# Patient Record
Sex: FEMALE | Race: WHITE | Employment: FULL TIME | URBAN - METROPOLITAN AREA
[De-identification: names, ages, dates, MRNs, and addresses within clinical notes are randomized per-mention and may not be internally consistent; named-entity substitution may affect disease eponyms.]

---

## 2017-03-23 ENCOUNTER — MYC MEDICAL ADVICE (OUTPATIENT)
Dept: FAMILY MEDICINE | Facility: CLINIC | Age: 42
End: 2017-03-23

## 2017-03-23 NOTE — TELEPHONE ENCOUNTER
See patient message. Sounds like she means symptoms increasing. Would you advise to take two prilosec 20 to see if this helps?    Jojo Gonzalez RN

## 2017-03-24 DIAGNOSIS — N92.1 BREAKTHROUGH BLEEDING ON OCPS: ICD-10-CM

## 2017-03-24 NOTE — TELEPHONE ENCOUNTER
Mail order pharmacy request    levonorgestrel-ethinyl estradiol (SEASONALE) 0.15-0.03 MG per tablet      Last Written Prescription Date: 11/7/16  Last Fill Quantity: 90,  # refills: prn   Last Office Visit with [facility], [facility] or [facility] prescribing provider: 11/7/16

## 2017-03-27 RX ORDER — LEVONORGESTREL AND ETHINYL ESTRADIOL 0.15-0.03
1 KIT ORAL DAILY
Qty: 90 TABLET | Refills: 3 | Status: SHIPPED | OUTPATIENT
Start: 2017-03-27 | End: 2017-11-13

## 2017-03-27 NOTE — TELEPHONE ENCOUNTER
Prescription approved per [facility] Refill Protocol.  Sent to new pharmacy.  Daja Glaser, RN  Triage Nurse

## 2017-04-05 ENCOUNTER — OFFICE VISIT (OUTPATIENT)
Dept: FAMILY MEDICINE | Facility: CLINIC | Age: 42
End: 2017-04-05
Payer: COMMERCIAL

## 2017-04-05 VITALS
DIASTOLIC BLOOD PRESSURE: 62 MMHG | OXYGEN SATURATION: 100 % | TEMPERATURE: 97.8 F | BODY MASS INDEX: 27.66 KG/M2 | HEIGHT: 70 IN | SYSTOLIC BLOOD PRESSURE: 98 MMHG | HEART RATE: 81 BPM | WEIGHT: 193.2 LBS | RESPIRATION RATE: 14 BRPM

## 2017-04-05 DIAGNOSIS — K21.9 GASTROESOPHAGEAL REFLUX DISEASE, ESOPHAGITIS PRESENCE NOT SPECIFIED: Primary | ICD-10-CM

## 2017-04-05 PROCEDURE — 99214 OFFICE O/P EST MOD 30 MIN: CPT | Performed by: INTERNAL MEDICINE

## 2017-04-05 NOTE — PATIENT INSTRUCTIONS
Zantac/Ranitidine:  I would recommend trying Ranitidine 150 MG once daily in addition to Omeprazole.

## 2017-04-05 NOTE — PROGRESS NOTES
SUBJECTIVE:                                                    Cassi Ye is a 41 year old female who presents to clinic today for the following health issues:      GERD/Heartburn   Onset: Ongoing, but noticeably worsened over the past 3 months.    Description:     Burning in chest: YES    Intensity: Moderate    Progression of Symptoms: Worsening and is constant, severity seems to wax and wane.    Accompanying Signs & Symptoms:  Does it feel like food gets stuck: no  Nausea: no  Vomiting (bloody?): no  Abdominal Pain: YES - bloating  Black-Tarry stools: no  Bloody stools: no   History:   Previous ulcers: no    Precipitating factors:   Caffeine use: no  Alcohol use: no  NSAID/Aspirin use: YES minimal - takes Aleve as needed for headaches, which only occur periodically.  Tobacco use:   Worsened with pineapple     Alleviating factors:  Uncertain  Therapies Tried and outcome: Has been taking chewable antacids and Omeprazole with little to no relief. She has increased her dose of Omeprazole from one to two tablets per day.       Problem list and histories reviewed & adjusted, as indicated.  Additional history: as documented    Labs reviewed in EPIC    Reviewed and updated as needed this visit by clinical staff  Tobacco  Allergies  Meds  Med Hx  Surg Hx  Fam Hx  Soc Hx      Reviewed and updated as needed this visit by Provider         REVIEW OF SYSTEMS:  C: NEGATIVE for fever, chills, or change in weight  R: NEGATIVE for significant cough or SOB  CV: NEGATIVE for chest pain, palpitations or peripheral edema  GI: Hx of and POSITIVE for Gastroesophageal Reflux Disease - use of Chewable Antacids and Omeprazole; NEGATIVE for nausea, abdominal pain, or change in bowel habits  : no symptoms   N: NEGATIVE for weakness, dizziness or paresthesias  P: NEGATIVE for changes in mood or affect    This document serves as a record of the services and decisions personally performed and made by Kim Penn MD. It was  "created on her behalf by Jina Engle, a trained medical scribe. The creation of this document is based the provider's statements to the medical scribe.  Jina Engle, April 5, 2017 3:17 PM     OBJECTIVE:                                                    BP 98/62 (BP Location: Right arm, Patient Position: Chair, Cuff Size: Adult Regular)  Pulse 81  Temp 97.8  F (36.6  C) (Oral)  Resp 14  Ht 1.765 m (5' 9.5\")  Wt 87.6 kg (193 lb 3.2 oz)  LMP 01/26/2017  SpO2 100%  BMI 28.12 kg/m2  Body mass index is 28.12 kg/(m^2).    EXAM:  GENERAL: Patient appears healthy, alert and not distressed.  EYES: Eyes appear grossly normal to inspection, with normal conjunctivae and sclerae  RESP: Lungs are clear to auscultation - no rales, rhonchi or wheezes present  CV: Regular rate and rhythm, normal S1 S2 heart sounds, no ectopy, no peripheral edema present, peripheral pulses normal, no carotid bruit.  ABDOMEN: Soft, nontender, no hepatosplenomegaly, no masses, normal bowel sounds  MS: No gross musculoskeletal defects noted, no edema, gait is age appropriate without ataxia  NEURO: Patient exhibits normal strength and tone, normal speech and mentation  PSYCH: Mentation appears normal, affect is normal/bright    Diagnostic Test Results:  No results found for this or any previous visit (from the past 24 hour(s)).      ASSESSMENT/PLAN:                                                    (K21.9) Gastroesophageal reflux disease, esophagitis presence not specified  (primary encounter diagnosis)  Comment: Patient has been on PPI Omeprazole (Protonix historically) on a fairly chronic basis; Recently, she has been experiencing increased symptoms despite increasing her daily dose of Omeprazole from 20 to 40 MG; On occasion, she has to sleep upright because her symptoms are so prominent; Triggers reviewed - patient denied having used or consumed any caffeine, nicotine, carbonated beverages, alcohol, and has not been overwhelmingly " stressed lately; Recommended she try over-the-counter Ranitidine 150 MG or Pepcid 10 MG in addition to Omeprazole; Also referred the patient to follow-up with Gastroenterology for an EGD procedure to determine etiology of her symptoms.  Plan: GASTROENTEROLOGY ADULT REF PROCEDURE ONLY            The information in this document, created by a medical scribe for me, accurately reflects the services I personally performed and the decisions made by me. I have reviewed and approved this document for accuracy.  Dr. Kim Penn, April 5, 2017, 3:30 PM     Kim Pnen MD  Internal Medicine   [facility] [place]  25 minutes is spent with patient, over 50% of that time spent providing counselling, discussing and reviewing meds and potential side effects.

## 2017-04-05 NOTE — NURSING NOTE
"Chief Complaint   Patient presents with     Gastrointestinal Problem     increased heartburn, bloating,   intermittent hot flashes.   symptoms for about the past 3 months.        Initial BP 98/62 (BP Location: Right arm, Patient Position: Chair, Cuff Size: Adult Regular)  Pulse 81  Temp 97.8  F (36.6  C) (Oral)  Resp 14  Ht 5' 9.5\" (1.765 m)  Wt 193 lb 3.2 oz (87.6 kg)  LMP 01/26/2017  SpO2 100%  BMI 28.12 kg/m2 Estimated body mass index is 28.12 kg/(m^2) as calculated from the following:    Height as of this encounter: 5' 9.5\" (1.765 m).    Weight as of this encounter: 193 lb 3.2 oz (87.6 kg).  Medication Reconciliation: complete    "

## 2017-04-20 ENCOUNTER — HOSPITAL ENCOUNTER (OUTPATIENT)
Facility: CLINIC | Age: 42
Discharge: HOME | End: 2017-04-20
Attending: INTERNAL MEDICINE | Admitting: INTERNAL MEDICINE
Payer: COMMERCIAL

## 2017-04-20 VITALS
RESPIRATION RATE: 16 BRPM | SYSTOLIC BLOOD PRESSURE: 110 MMHG | OXYGEN SATURATION: 98 % | DIASTOLIC BLOOD PRESSURE: 76 MMHG

## 2017-04-20 LAB — UPPER GI ENDOSCOPY: NORMAL

## 2017-04-20 PROCEDURE — 25000132 ZZH RX MED GY IP 250 OP 250 PS 637: Performed by: INTERNAL MEDICINE

## 2017-04-20 PROCEDURE — 25000128 H RX IP 250 OP 636: Performed by: INTERNAL MEDICINE

## 2017-04-20 PROCEDURE — 43239 EGD BIOPSY SINGLE/MULTIPLE: CPT | Performed by: INTERNAL MEDICINE

## 2017-04-20 PROCEDURE — 88305 TISSUE EXAM BY PATHOLOGIST: CPT | Mod: 26 | Performed by: INTERNAL MEDICINE

## 2017-04-20 PROCEDURE — 25000125 ZZHC RX 250: Performed by: INTERNAL MEDICINE

## 2017-04-20 PROCEDURE — 40000104 ZZH STATISTIC MODERATE SEDATION < 10 MIN: Performed by: INTERNAL MEDICINE

## 2017-04-20 PROCEDURE — 88305 TISSUE EXAM BY PATHOLOGIST: CPT | Performed by: INTERNAL MEDICINE

## 2017-04-20 RX ORDER — ONDANSETRON 2 MG/ML
4 INJECTION INTRAMUSCULAR; INTRAVENOUS EVERY 6 HOURS PRN
Status: DISCONTINUED | OUTPATIENT
Start: 2017-04-20 | End: 2017-04-20 | Stop reason: HOSPADM

## 2017-04-20 RX ORDER — FENTANYL CITRATE 50 UG/ML
INJECTION, SOLUTION INTRAMUSCULAR; INTRAVENOUS PRN
Status: DISCONTINUED | OUTPATIENT
Start: 2017-04-20 | End: 2017-04-20 | Stop reason: HOSPADM

## 2017-04-20 RX ORDER — ONDANSETRON 4 MG/1
4 TABLET, ORALLY DISINTEGRATING ORAL EVERY 6 HOURS PRN
Status: DISCONTINUED | OUTPATIENT
Start: 2017-04-20 | End: 2017-04-20 | Stop reason: HOSPADM

## 2017-04-20 RX ORDER — NALOXONE HYDROCHLORIDE 0.4 MG/ML
.1-.4 INJECTION, SOLUTION INTRAMUSCULAR; INTRAVENOUS; SUBCUTANEOUS
Status: DISCONTINUED | OUTPATIENT
Start: 2017-04-20 | End: 2017-04-20 | Stop reason: HOSPADM

## 2017-04-20 RX ORDER — FLUMAZENIL 0.1 MG/ML
0.2 INJECTION, SOLUTION INTRAVENOUS
Status: DISCONTINUED | OUTPATIENT
Start: 2017-04-20 | End: 2017-04-20 | Stop reason: HOSPADM

## 2017-04-20 RX ORDER — LIDOCAINE 40 MG/G
CREAM TOPICAL
Status: DISCONTINUED | OUTPATIENT
Start: 2017-04-20 | End: 2017-04-20 | Stop reason: HOSPADM

## 2017-04-20 RX ORDER — ONDANSETRON 2 MG/ML
4 INJECTION INTRAMUSCULAR; INTRAVENOUS
Status: DISCONTINUED | OUTPATIENT
Start: 2017-04-20 | End: 2017-04-20 | Stop reason: HOSPADM

## 2017-04-20 NOTE — LETTER
April 6, 2017      Cassi Ye  [address] [place] [address]              Dear Cassi Ye,        Thank you for choosing [facility].  You are scheduled for the following service (s).   Date:    4-20-17    Procedure:   EGD  Doctor: Yissel              Arrival Time:  0930   *check in at Emergency/Endoscopy desk*  Procedure Time:  1000     Location:   [facility]       Endoscopy Department, First Floor (Enter through ER Doors) *        [address] [place], [place] [address]     [phone]   UPPER ENDOSCOPY         PRE-PROCEDURE CHECKLIST    If you have diabetes, ask your regular doctor for diet and medication restrictions.  If you take a medication to thin your blood (such as Coumadin or Lovenox) and have not already discussed this please call your primary doctor to advice on holding this medication  If you take aspirin or Plavix, you may continue to do so.  If you are or may be pregnant, please discuss the risks and benefits of this procedure with your doctor.  You must arrange for a ride for the day of your exam. If you fail to arrange transportation with a responsible adult, your procedure will need to be cancelled and rescheduled. Taxi ,Bus and Medical transport are not acceptable unless you have a responsible adult that you know & trust with you.  Please arrange for this  to be able to pick you up in our department, approximately one hour after your scheduled procedure, if they are not able to stay with you.  *If you must cancel or reschedule your appointment, please call Endoscopy  at [phone].*                        PREPARATION  To ensure a successful exam, please follow all instructions carefully. Failure to accurately and completely prepare for your exam may result in the need for an additional procedure .      The night before your exam:    Stop eating solid foods at 11:45 pm.     Clear liquids are okay to  drink (examples: water, Gatorade, clear broth and apple juice).     Do not drink red liquids or alcoholic beverages.  The day of your exam:    Stop drinking clear liquids 4 hours before your exam     You may take your usual medications with 4 oz. of water at least 4 hours prior to your procedure.  When you leave for the procedure:    Bring a list of all of your current medications, including any allergy or over-the-counter medications.    Bring a photo ID as well as up-to-date insurance information, such as your insurance card and any referral forms that might be required by your payer.   What is an upper endoscopy?  An upper endoscopy is a test performed to evaluate symptoms of persistent upper abdominal pain, bleeding, nausea, vomiting or difficulty swallowing. During the procedure, a doctor examines the lining of your esophagus, stomach and the first part of your small intestine through a thin, flexible tube called an endoscope. If growths or other abnormalities are found during the procedure, the doctor may remove the abnormal tissue for further examination, or biopsy. An upper endoscopy may also be used to treat various conditions of the upper gastrointestinal (GI) tract, such as narrowing, abnormal growths or bleeding.  What happens during an upper endoscopy?  Plan to spend up to two hours at the endoscopy center the day of your procedure. The exam itself takes about 15 minutes to complete.   Before the exam:    You will change into a gown.    Your medical history will be reviewed with you and you will be given a consent form to sign.     A nurse will insert an intravenous (IV) line into your hand or arm.  During the exam:    Medicine will be given through the IV line to help you relax and feel drowsy.     Your heart rate and oxygen levels will be monitored. If your blood pressure is low, you may be given fluids through the IV line.     The doctor will insert a flexible, hollow tube - called an endoscope -  into your mouth and will advance it slowly through the esophagus, stomach and duodenum (the first part of your small intestine).     You may have a feeling of pressure or fullness.     If you have difficulty swallowing, and the doctor finds a narrowing in your esophagus, it may be possible for the area to be expanded during the exam.     If abnormal tissue is found, the doctor may remove it through the endoscope for closer examination, or biopsy. Tissue removal is painless.  What happens after the exam?    The doctor will talk with you about the initial results of your exam.     The doctor will prepare a full report for the physician who referred you for the upper endoscopy.     You may feel bloated after the procedure. This is normal.     Your throat may feel sore for a short time.     Medication given during the exam will prohibit you from driving for the rest of the day.     Following the exam, you may resume your normal diet. Avoid alcohol until the next day.     You may resume your regular activities the day after the procedure.     A nurse will provide you with complete discharge instructions before you leave the endoscopy center. Be sure to ask the nurse for specific instructions if you take blood thinners such as aspirin, Coumadin or Plavix.     Any tissue samples removed during the exam will be sent to a lab for evaluation. It may take 5-7 working days for you to be notified of the results.                                                     DIRECTIONS  From the north ([facility])  Take [address] south, exit to [address] . Get into the left hand juan r, turn left (east), go one-half mile to [address]. Turn left (north) on [address]. Go north to first stoplight, take a right on the newly constructed road, [address] and follow it to the Emergency entrance.    From the south ([facility])  Take 35 north to the east split, [address], and exit to [address] . Turn left  (west) on [address]  to [address]. Turn right (north) on [address]. Go north to first stoplight, take a right on the newly constructed road, [address] and follow it to the Emergency entrance.    From the east via 35[address] ([facility])  Take [address] south to [place] [address] exit. Turn right on [address] . Go west to [address]. Turn right (north) on [address], go to the first stoplight, take a right and follow the newly constructed road, [address], to the Emergency entrance.    From the east via [address] ([facility])  Take [address] west to [address]. Turn left (south) on [address] to [address]. Turn left (east) on [address] and follow the newly constructed road to the Emergency entrance.    From the west via [address] ([facility])  Take [address] east to [address]. Turn right (south) on [address] to [address].  Turn left (east) on [address] and follow the newly constructed road to the Emergency entrance.

## 2017-04-20 NOTE — IP AVS SNAPSHOT
MRN:[id number]                      After Visit Summary   4/20/2017    Cassi Ye    MRN: [id number]           Thank you!     Thank you for choosing [facility] for your care. Our goal is always to provide you with excellent care. Hearing back from our patients is one way we can continue to improve our services. Please take a few minutes to complete the written survey that you may receive in the mail after you visit. If you would like to speak to someone directly about your visit please contact Patient Relations at [phone]. Thank you!          Patient Information     Date Of Birth          1975        About your hospital stay     You were admitted on:  April 20, 2017 You last received care in the:  [facility]    You were discharged on:  April 20, 2017       Who to Call     For medical emergencies, please call 911.  For non-urgent questions about your medical care, please call your primary care provider or clinic, [phone]  For questions related to your surgery, please call your surgery clinic        Attending Provider     Provider Specialty    Mario Dey MD Gastroenterology       Primary Care Provider Office Phone # Fax #    Kim Kimberly Penn [phone]       [facility] [place] [address] [facility] [address]        Further instructions from your care team         Understanding H. pylori and Ulcers  Traditionally, ulcers, or sores in the lining of your digestive tract, were thought to be caused by too much spicy food, stress, or an anxious personality. We now know that most ulcers are probably due to infection with bacteria known as Helicobacter pylori (H. pylori).     H. pylori invade and disturb the lining of the digestive tract. Acid may weaken the area, causing an ulcer.      Common Ulcer Symptoms  Burning, cramping, or hunger-like pain in the stomach area, often one to three hours after a meal or in the  middle of the night  Pain that gets better or worse with eating  Nausea or vomiting  Black, tarry, or bloody stools (which means the ulcer is bleeding)  Or you may have no symptoms.     An ulcer can form in two areas of the digestive tract; the stomach and the duodenum (where the stomach meets the small intestine).      Your Evaluation  An evaluation by your doctor can show if you have an ulcer and determine whether it was caused by H. pylori. Your doctor may ask you questions, examine you, and possibly do some tests. These may include:  A special X-ray called a barium upper gastrointestinal series, to help locate an ulcer. During the test, you drink a chalky liquid. This liquid helps the ulcer show up on the X-ray.  An endoscopic exam, done with a long tube passed through your mouth into your stomach, to give the doctor a closer look at your ulcer. You will be lightly sedated for this procedure. Your doctor can also take a tissue sample to test for H. pylori.  Blood, stool, and breath tests are also available to show whether you have H. pylori in your digestive tract.  Your Treatment  To kill H. pylori so your ulcer can heal, your doctor will probably prescribe antibiotics. Other ulcer medications that help reduce stomach acid may also be prescribed as well. Testing after treatment is recommended to be sure the H. pylori infection is gone. Usually, killing H. pylori helps keep the ulcer from returning.    2955[address] [place] [address]. All rights reserved. This information is not intended as a substitute for professional medical care. Always follow your healthcare professional's instructions.                Pending Results     No orders found from 4/18/2017 to 4/21/2017.            Admission Information     Date & Time Provider Department Dept. Phone    4/20/2017 Mario Dey MD [facility] [phone]      Your Vitals Were     Blood Pressure Respirations  Last Period Pulse Oximetry          114/73 (Cuff Size: Adult Regular) 15 01/26/2017 96%        [employer] Information     [employer] gives you secure access to your electronic health record. If you see a primary care provider, you can also send messages to your care team and make appointments. If you have questions, please call your primary care clinic.  If you do not have a primary care provider, please call [phone] and they will assist you.        Care EveryWhere ID     This is your Care EveryWhere ID. This could be used by other organizations to access your [place] medical records  [id number]           Review of your medicines      CONTINUE these medicines which have NOT CHANGED        Dose / Directions    levonorgestrel-ethinyl estradiol 0.15-0.03 MG per tablet   Commonly known as:  SEASONALE   Used for:  Breakthrough bleeding on OCPs        Dose:  1 tablet   Take 1 tablet by mouth daily   Quantity:  90 tablet   Refills:  3       magnesium 250 MG tablet        Dose:  1 tablet   Take 1 tablet by mouth 2 times daily.   Refills:  0       MAXALT-MLT 10 MG ODT tab   Used for:  Variants of migraine, not elsewhere classified, without mention of intractable migraine without mention of status migrainosus   Generic drug:  rizatriptan        Dose:  10 mg   Take 10 mg by mouth at onset of headache.   Refills:  0       multivitamin per tablet        1 TABLET DAILY   Refills:  0       nortriptyline 25 MG capsule   Commonly known as:  PAMELOR   Used for:  Variants of migraine, not elsewhere classified, without mention of intractable migraine without mention of status migrainosus        Dose:  50 mg   Take 50 mg by mouth At Bedtime.   Refills:  0       priLOSEC OTC 20 MG tablet   Generic drug:  omeprazole        Dose:  1 tablet   Take 1 tablet by mouth daily.   Refills:  0       VITAMIN B-2 PO        Dose:  200 mg   Take 200 mg by mouth 2 times daily.   Refills:  0       VITAMIN D (CHOLECALCIFEROL) PO        Dose:  1000 Units    Take 1,000 Units by mouth daily   Refills:  0                Protect others around you: Learn how to safely use, store and throw away your medicines at www.disposemymeds.org.             Medication List: This is a list of all your medications and when to take them. Check marks below indicate your daily home schedule. Keep this list as a reference.      Medications           Morning Afternoon Evening Bedtime As Needed    levonorgestrel-ethinyl estradiol 0.15-0.03 MG per tablet   Commonly known as:  SEASONALE   Take 1 tablet by mouth daily                                magnesium 250 MG tablet   Take 1 tablet by mouth 2 times daily.                                MAXALT-MLT 10 MG ODT tab   Take 10 mg by mouth at onset of headache.   Generic drug:  rizatriptan                                multivitamin per tablet   1 TABLET DAILY                                nortriptyline 25 MG capsule   Commonly known as:  PAMELOR   Take 50 mg by mouth At Bedtime.                                priLOSEC OTC 20 MG tablet   Take 1 tablet by mouth daily.   Generic drug:  omeprazole                                VITAMIN B-2 PO   Take 200 mg by mouth 2 times daily.                                VITAMIN D (CHOLECALCIFEROL) PO   Take 1,000 Units by mouth daily                                          More Information        What Is Acid Reflux?    Do you have to clear your throat or cough often? Are you hoarse? Do you have difficulty swallowing? If you have these or other throat symptoms, you may have acid reflux (when stomach acid washes up and irritates your throat).  Why You Have Throat Symptoms  At both ends of the esophagus (the tube that carries food to the stomach) are the esophageal sphincters. These muscles relax to let food pass, then tighten to keep stomach acid down. When the lower esophageal sphincter (LES) doesn t tighten enough, acid can reflux from the stomach into the esophagus. This may cause heartburn. If the upper  esophageal sphincter (UES) also doesn t work well, acid can travel higher and enter your throat (pharynx). In many cases, this causes throat symptoms.  Common Throat Symptoms    Frequent need to clear your throat    Feeling like you re choking    Chronic cough    Hoarseness    Trouble swallowing    Sensation of having  a lump in the throat     Sour or acid taste    Recurrent sore throat     7941-7970 [employer]. [address] [place] [address]. All rights reserved. This information is not intended as a substitute for professional medical care. Always follow your healthcare professional's instructions.                Tips to Control Acid Reflux  To control acid reflux, you ll need to make some basic diet and lifestyle changes. The simple steps outlined below may be all you ll need to relieve discomfort.  Watch What You Eat      Avoid fatty foods and spicy foods.    Eat fewer acidic foods, such as citrus and tomato-based foods. These can increase symptoms.    Limit drinking alcohol, caffeine, and fizzy beverages. All increase acid reflux.    Try limiting chocolate, peppermint, and spearmint. These can worsen acid reflux in some people.  Watch When You Eat    Avoid lying down for 3 hours after eating.    Do not snack before going to bed.  Raise Your Head    Raising your head and upper body by 4 inches to 6 inches helps limit reflux when you re lying down. Put blocks under the head of the bed frame to raise it.  Other Changes    Lose weight, if you need to.    Don t work out near bedtime.    Avoid tight-fitting clothes.    Limit aspirin and ibuprofen.    Stop smoking.     6628-4995 [employer]. [address] [place] [address]. All rights reserved. This information is not intended as a substitute for professional medical care. Always follow your healthcare professional's instructions.

## 2017-04-20 NOTE — H&P
Pre-Endoscopy History and Physical     Cassi Ye MRN# [id number]   YOB: 1975 Age: 41 year old     Date of Procedure: 4/20/2017  Primary care provider: Kim Penn  Type of Endoscopy: Gastroscopy with possible biopsy, possible dilation  Reason for Procedure: reflux  Type of Anesthesia Anticipated: Conscious Sedation    HPI:    Cassi is a 41 year old female who will be undergoing the above procedure.      A history and physical has been performed. The patient's medications and allergies have been reviewed. The risks and benefits of the procedure and the sedation options and risks were discussed with the patient.  All questions were answered and informed consent was obtained.      She denies a personal or family history of anesthesia complications or bleeding disorders.     Patient Active Problem List   Diagnosis     Migraine variant     Blood type A+     GERD (gastroesophageal reflux disease)     CARDIOVASCULAR SCREENING; LDL GOAL LESS THAN 160        Past Medical History:   Diagnosis Date     Pain in joint, pelvic region and thigh      Variants of migraine, not elsewhere classified, without mention of intractable migraine without mention of status migrainosus     Nasal spray        Past Surgical History:   Procedure Laterality Date     C NONSPECIFIC PROCEDURE  12/00    Cryotherapy       Social History   Substance Use Topics     Smoking status: Former Smoker     Packs/day: 0.50     Years: 10.00     Types: Cigarettes     Quit date: 4/13/2006     Smokeless tobacco: Never Used      Comment: Started in 1994     Alcohol use 0.0 oz/week     0 Standard drinks or equivalent per week      Comment: occasional, not while pregnant       Family History   Problem Relation Age of Onset     Family History Negative Brother      Lipids Father      Neurologic Disorder Father      migraines     Family History Negative Mother      Breast Cancer Paternal Aunt      Breast Cancer Paternal Grandmother       "Blood Disease Other      paternal uncle with leukemia     CANCER Paternal Aunt      ovarian cancer       Prior to Admission medications    Medication Sig Start Date End Date Taking? Authorizing Provider   levonorgestrel-ethinyl estradiol (SEASONALE) 0.15-0.03 MG per tablet Take 1 tablet by mouth daily 3/27/17   Kim Penn MD   VITAMIN D, CHOLECALCIFEROL, PO Take 1,000 Units by mouth daily    Reported, Patient   magnesium 250 MG tablet Take 1 tablet by mouth 2 times daily.    Jeanmarie Loving MD   omeprazole (PRILOSEC OTC) 20 MG tablet Take 1 tablet by mouth daily.    Reported, Patient   Riboflavin (VITAMIN B-2 PO) Take 200 mg by mouth 2 times daily.    Reported, Patient   nortriptyline (PAMELOR) 25 MG capsule Take 50 mg by mouth At Bedtime.    [facility], [facility]   rizatriptan (MAXALT-MLT) 10 MG disintegrating tablet Take 10 mg by mouth at onset of headache.    [facility]   MULTIVITAMINS OR TABS 1 TABLET DAILY    Reported, Patient       Allergies   Allergen Reactions     Penicillins      rash        REVIEW OF SYSTEMS:   5 point ROS negative except as noted above in HPI, including Gen., Resp., CV, GI &  system review.    PHYSICAL EXAM:   LMP 01/26/2017 Estimated body mass index is 28.12 kg/(m^2) as calculated from the following:    Height as of 4/5/17: 1.765 m (5' 9.5\").    Weight as of 4/5/17: 87.6 kg (193 lb 3.2 oz).   GENERAL APPEARANCE: alert, and oriented  MENTAL STATUS: alert  AIRWAY EXAM: Mallampatti Class I (visualization of the soft palate, fauces, uvula, anterior and posterior pillars)  RESP: lungs clear to auscultation - no rales, rhonchi or wheezes  CV: regular rates and rhythm  DIAGNOSTICS:    Not indicated    IMPRESSION   ASA Class 2 - Mild systemic disease    PLAN:   Plan for Gastroscopy with possible biopsy, possible dilation. We discussed the risks, benefits and alternatives and the patient wished to proceed.    The above has been forwarded to the " consulting provider.      Signed Electronically by: Mario Dey  April 20, 2017

## 2017-04-20 NOTE — DISCHARGE INSTRUCTIONS
Understanding H. pylori and Ulcers  Traditionally, ulcers, or sores in the lining of your digestive tract, were thought to be caused by too much spicy food, stress, or an anxious personality. We now know that most ulcers are probably due to infection with bacteria known as Helicobacter pylori (H. pylori).     H. pylori invade and disturb the lining of the digestive tract. Acid may weaken the area, causing an ulcer.      Common Ulcer Symptoms  Burning, cramping, or hunger-like pain in the stomach area, often one to three hours after a meal or in the middle of the night  Pain that gets better or worse with eating  Nausea or vomiting  Black, tarry, or bloody stools (which means the ulcer is bleeding)  Or you may have no symptoms.     An ulcer can form in two areas of the digestive tract; the stomach and the duodenum (where the stomach meets the small intestine).      Your Evaluation  An evaluation by your doctor can show if you have an ulcer and determine whether it was caused by H. pylori. Your doctor may ask you questions, examine you, and possibly do some tests. These may include:  A special X-ray called a barium upper gastrointestinal series, to help locate an ulcer. During the test, you drink a chalky liquid. This liquid helps the ulcer show up on the X-ray.  An endoscopic exam, done with a long tube passed through your mouth into your stomach, to give the doctor a closer look at your ulcer. You will be lightly sedated for this procedure. Your doctor can also take a tissue sample to test for H. pylori.  Blood, stool, and breath tests are also available to show whether you have H. pylori in your digestive tract.  Your Treatment  To kill H. pylori so your ulcer can heal, your doctor will probably prescribe antibiotics. Other ulcer medications that help reduce stomach acid may also be prescribed as well. Testing after treatment is recommended to be sure the H. pylori infection is gone. Usually, killing H. pylori  helps keep the ulcer from returning.    [id number] [employer], [address] [place] [address], [place] [address]. All rights reserved. This information is not intended as a substitute for professional medical care. Always follow your healthcare professional's instructions.

## 2017-04-21 LAB — COPATH REPORT: NORMAL

## 2017-05-05 ENCOUNTER — TRANSFERRED RECORDS (OUTPATIENT)
Dept: HEALTH INFORMATION MANAGEMENT | Facility: CLINIC | Age: 42
End: 2017-05-05

## 2017-08-09 ENCOUNTER — OFFICE VISIT (OUTPATIENT)
Dept: FAMILY MEDICINE | Facility: CLINIC | Age: 42
End: 2017-08-09
Payer: COMMERCIAL

## 2017-08-09 VITALS
TEMPERATURE: 98.1 F | SYSTOLIC BLOOD PRESSURE: 96 MMHG | BODY MASS INDEX: 27.61 KG/M2 | WEIGHT: 189.7 LBS | DIASTOLIC BLOOD PRESSURE: 50 MMHG | RESPIRATION RATE: 15 BRPM | HEART RATE: 85 BPM | OXYGEN SATURATION: 100 %

## 2017-08-09 DIAGNOSIS — M77.11 LATERAL EPICONDYLITIS OF RIGHT ELBOW: Primary | ICD-10-CM

## 2017-08-09 PROCEDURE — 99213 OFFICE O/P EST LOW 20 MIN: CPT | Performed by: INTERNAL MEDICINE

## 2017-08-09 RX ORDER — NAPROXEN SODIUM 220 MG
440 TABLET ORAL 2 TIMES DAILY WITH MEALS
Qty: 60 TABLET | COMMUNITY
Start: 2017-08-09 | End: 2017-11-13

## 2017-08-09 NOTE — MR AVS SNAPSHOT
After Visit Summary   8/9/2017    Cassi Ye    MRN: [id number]           Patient Information     Date Of Birth          1975        Visit Information        Provider Department      8/9/2017 4:15 PM [facility]        Today's Diagnoses     Lateral epicondylitis of right elbow    -  1      Care Instructions    Follow up with Hand/Sports Medicine     Try taking Aleve 2 tablets twice daily for 10-14 to help with inflammation     Continue with regular use of Tennis Elbow strap - check positioning with pulling up under a table, as directed                 Lateral Epicondylitis (Tennis Elbow)   Rehabilitation Exercises   You may do the stretching exercises right away. You may do the strengthening exercises when stretching is nearly painless.   Stretching exercises     Wrist range of motion: Bend your wrist forward and backward as far as you can. Do 3 sets of 10.     Pronation and supination of the forearm: With your elbow bent 90 , turn your palm upward and hold for 5 seconds. Slowly turn your palm downward and hold for 5 seconds. Make sure you keep your elbow at your side and bent 90  throughout this exercise. Do 3 sets of 10.     Elbow range of motion: Gently bring your palm up toward your shoulder and bend your elbow as far as you can. Then straighten your elbow as far as you can 10 times. Do 3 sets of 10.   Strengthening exercises     Wrist flexion exercise: Hold a can or hammer handle in your hand with your palm facing up. Bend your wrist upward. Slowly lower the weight and return to the starting position. Do 3 sets of 10. Gradually increase the weight of the can or weight you are holding.     Wrist extension exercise: Hold a soup can or hammer handle in your hand with your palm facing down. Slowly bend your wrist upward. Slowly lower the weight down into the starting position. Do 3 sets of 10. Gradually increase the weight of the object you are  holding.     Wrist radial deviation strengthening: Put your wrist in the sideways position with your thumb up. Hold a can of soup or a hammer handle and gently bend your wrist up, with the thumb reaching toward the ceiling. Slowly lower to the starting position. Do not move your forearm throughout this exercise. Do 3 sets of 10.     Forearm pronation and supination strengthening: Hold a soup can or hammer handle in your hand and bend your elbow 90 . Slowly rotate your hand with your palm upward and then palm down. Do 3 sets of 10.     Wrist extension (with broom handle): Stand up and hold a broom handle in both hands. With your arms at shoulder level, elbows straight and palms down, roll the broom handle backward in your hand as if you are reeling something in using a broom handle. Do 3 sets of 10.   Written by Nakita Parr MS, PT, for [employer].   Published by [employer].   This content is reviewed periodically and is subject to change as new health information becomes available. The information is intended to inform and educate and is not a replacement for medical evaluation, advice, diagnosis or treatment by a healthcare professional.   Sports Medicine Advisor 2003.1 Index  Sports Medicine Advisor 2003.1 Credits   Copyright   2003 [employer]. All rights reserved.                      Follow-ups after your visit        Additional Services     ORTHO  REFERRAL       [facility] is referring you to the Orthopedic  Services at [facility].       The  Representative will assist you in the coordination of your Orthopedic and Musculoskeletal Care as prescribed by your physician.    The  Representative will call you within 1 business day to help schedule your appointment, or you may contact the  Representative at:    All areas ~ [phone]     Type of Referral : Non Surgical        Timeframe requested: 3 - 5 days    Coverage of these services is subject to the terms and limitations of your health insurance plan.  Please call member services at your health plan with any benefit or coverage questions.      If X-rays, CT or MRI's have been performed, please contact the facility where they were done to arrange for , prior to your scheduled appointment.  Please bring this referral request to your appointment and present it to your specialist.                  Your next 10 appointments already scheduled     Nov 13, 2017  4:15 PM CST   [facility] with Kim Penn MD   [facility]    [address] [facility] [address] [phone]              Who to contact     If you have questions or need follow up information about today's clinic visit or your schedule please contact [facility] directly at [phone].  Normal or non-critical lab and imaging results will be communicated to you by MyChart, letter or phone within 4 business days after the clinic has received the results. If you do not hear from us within 7 days, please contact the clinic through "[employer]hart or phone. If you have a critical or abnormal lab result, we will notify you by phone as soon as possible.  Submit refill requests through [employer] or call your pharmacy and they will forward the refill request to us. Please allow 3 business days for your refill to be completed.          Additional Information About Your Visit        "[employer] Information     [employer] gives you secure access to your electronic health record. If you see a primary care provider, you can also send messages to your care team and make appointments. If you have questions, please call your primary care clinic.  If you do not have a primary care provider, please call [phone] and they will assist you.        Care EveryWhere ID     This is your Care EveryWhere ID. This  could be used by other organizations to access your [facility] medical records  [id number]        Your Vitals Were     Pulse Temperature Respirations Pulse Oximetry BMI (Body Mass Index)       85 98.1  F (36.7  C) (Oral) 15 100% 27.61 kg/m2        Blood Pressure from Last 3 Encounters:   08/09/17 96/50   04/20/17 110/76   04/05/17 98/62    Weight from Last 3 Encounters:   08/09/17 189 lb 11.2 oz (86 kg)   04/05/17 193 lb 3.2 oz (87.6 kg)   11/07/16 190 lb (86.2 kg)              We Performed the Following     ORTHO  REFERRAL          Today's Medication Changes          These changes are accurate as of: 8/9/17  5:01 PM.  If you have any questions, ask your nurse or doctor.               Start taking these medicines.        Dose/Directions    naproxen sodium 220 MG tablet   Commonly known as:  ANAPROX   Used for:  Lateral epicondylitis of right elbow   Started by:  Kim Penn MD        Dose:  440 mg   Take 2 tablets (440 mg) by mouth 2 times daily (with meals)   Quantity:  60 tablet   Refills:  0            Where to get your medicines      Some of these will need a paper prescription and others can be bought over the counter.  Ask your nurse if you have questions.     You don't need a prescription for these medications     naproxen sodium 220 MG tablet                Primary Care Provider Office Phone # Fax #    Kim Penn [phone] [address] [facility] [address]        Equal Access to Services     REYES REYES AH: Hadii codey ku hadasho Socurryali, waaxda luqadaha, qaybta kaalmada adeegyada, waxrosaura idimaximo hernández. So [facility] [phone].    ATENCIÓN: Si habla español, tiene a robertson disposición servicios gratuitos de asistencia lingüística. Llame al [phone].    We comply with applicable federal civil rights laws and [place] laws. We do not discriminate on the basis of race, color, national origin, age, disability sex, sexual orientation or  gender identity.            Thank you!     Thank you for [facility]  [facility]. Our goal is always to provide you with excellent care. Hearing back from our patients is one way we can continue to improve our services. Please take a few minutes to complete the written survey that you may receive in the mail after your visit with us. Thank you!             Your Updated Medication List - Protect others around you: Learn how to safely use, store and throw away your medicines at www.disposemymeds.org.          This list is accurate as of: 8/9/17  5:01 PM.  Always use your most recent med list.                   Brand Name Dispense Instructions for use Diagnosis    levonorgestrel-ethinyl estradiol 0.15-0.03 MG per tablet    SEASONALE    90 tablet    Take 1 tablet by mouth daily    Breakthrough bleeding on OCPs       magnesium 250 MG tablet      Take 1 tablet by mouth 2 times daily.        MAXALT-MLT 10 MG ODT tab   Generic drug:  rizatriptan      Take 10 mg by mouth at onset of headache.    Variants of migraine, not elsewhere classified, without mention of intractable migraine without mention of status migrainosus       multivitamin per tablet      1 TABLET DAILY        naproxen sodium 220 MG tablet    ANAPROX    60 tablet    Take 2 tablets (440 mg) by mouth 2 times daily (with meals)    Lateral epicondylitis of right elbow       nortriptyline 25 MG capsule    PAMELOR     Take 50 mg by mouth At Bedtime.    Variants of migraine, not elsewhere classified, without mention of intractable migraine without mention of status migrainosus       priLOSEC OTC 20 MG tablet   Generic drug:  omeprazole      Take 1 tablet by mouth daily.        VITAMIN B-2 PO      Take 200 mg by mouth 2 times daily.        VITAMIN D (CHOLECALCIFEROL) PO      Take 1,000 Units by mouth daily

## 2017-08-09 NOTE — PATIENT INSTRUCTIONS
Follow up with Hand/Sports Medicine     Try taking Aleve 2 tablets twice daily for 10-14d to help with inflammation     Continue with regular use of Tennis Elbow strap - check positioning and proper placement to offload the lateral epicondyle.  Reviewed at appt                 Lateral Epicondylitis (Tennis Elbow)   Rehabilitation Exercises   You may do the stretching exercises right away. You may do the strengthening exercises when stretching is nearly painless.   Stretching exercises     Wrist range of motion: Bend your wrist forward and backward as far as you can. Do 3 sets of 10.     Pronation and supination of the forearm: With your elbow bent 90 , turn your palm upward and hold for 5 seconds. Slowly turn your palm downward and hold for 5 seconds. Make sure you keep your elbow at your side and bent 90  throughout this exercise. Do 3 sets of 10.     Elbow range of motion: Gently bring your palm up toward your shoulder and bend your elbow as far as you can. Then straighten your elbow as far as you can 10 times. Do 3 sets of 10.   Strengthening exercises     Wrist flexion exercise: Hold a can or hammer handle in your hand with your palm facing up. Bend your wrist upward. Slowly lower the weight and return to the starting position. Do 3 sets of 10. Gradually increase the weight of the can or weight you are holding.     Wrist extension exercise: Hold a soup can or hammer handle in your hand with your palm facing down. Slowly bend your wrist upward. Slowly lower the weight down into the starting position. Do 3 sets of 10. Gradually increase the weight of the object you are holding.     Wrist radial deviation strengthening: Put your wrist in the sideways position with your thumb up. Hold a can of soup or a hammer handle and gently bend your wrist up, with the thumb reaching toward the ceiling. Slowly lower to the starting position. Do not move your forearm throughout this exercise. Do 3 sets of 10.     Forearm  pronation and supination strengthening: Hold a soup can or hammer handle in your hand and bend your elbow 90 . Slowly rotate your hand with your palm upward and then palm down. Do 3 sets of 10.     Wrist extension (with broom handle): Stand up and hold a broom handle in both hands. With your arms at shoulder level, elbows straight and palms down, roll the broom handle backward in your hand as if you are reeling something in using a broom handle. Do 3 sets of 10.   Written by Nakita Parr, MS, PT, for [employer].   Published by [employer].   This content is reviewed periodically and is subject to change as new health information becomes available. The information is intended to inform and educate and is not a replacement for medical evaluation, advice, diagnosis or treatment by a healthcare professional.   Sports Medicine Advisor 2003.1 Index  Sports Medicine Advisor 2003.1 Credits   Copyright   2003 [employer]. All rights reserved.

## 2017-08-09 NOTE — NURSING NOTE
"Chief Complaint   Patient presents with     Musculoskeletal Problem     right upper arm that goes down to the fingers.  at times thumb and index finger have a burning sensation.   symptoms present for  a few years but worse and more persistent over  the past few months        Initial BP 96/50  Pulse 85  Temp 98.1  F (36.7  C) (Oral)  Resp 15  Wt 189 lb 11.2 oz (86 kg)  SpO2 100%  BMI 27.61 kg/m2 Estimated body mass index is 27.61 kg/(m^2) as calculated from the following:    Height as of 4/5/17: 5' 9.5\" (1.765 m).    Weight as of this encounter: 189 lb 11.2 oz (86 kg).  Medication Reconciliation: complete    "

## 2017-08-09 NOTE — PROGRESS NOTES
SUBJECTIVE:                                                    Cassi Ye is a 41 year old female who presents to clinic today for the following health issues:    Joint Pain    Onset: has been present for several years but worse over the past few months.      Description:   Location: right elbow, right wrist and from the upper arm and into the fingers (thumb and index fingers)  Character: Burning and shooting sensation from the elbow to fingers (thumb and index fingers)    Intensity: moderate    Progression of Symptoms: worse, intermittent    Accompanying Signs & Symptoms:  Other symptoms: radiation of pain to to the fingers (thumb and index fingers)    History: Previous similar pain: YES- just as noted      Precipitating factors: Trauma or overuse: nothing specific but is on a computer most of the day    Alleviating factors: Improved by: tennis elbow brace does help some - worse without regular use    Therapies Tried and outcome: tennis elbow brace     She complained of minimal involvement of pain above elbow. She reported an instance of pain with using a TV remote the other night, and was unable to do yoga without pain usually.     She has been taking one Aleve daily for pain relief, with some good results.       Problem list and histories reviewed & adjusted, as indicated.  Additional history: as documented    Reviewed and updated as needed this visit by clinical staff  Tobacco  Allergies  Meds  Problems  Med Hx  Surg Hx  Fam Hx  Soc Hx        Reviewed and updated as needed this visit by Provider  Allergies  Meds  Problems         ROS:  Constitutional, HEENT, cardiovascular, pulmonary, GI, , musculoskeletal, neuro, skin, endocrine and psych systems are negative, except as in HPI or otherwise noted     This document serves as a record of the services and decisions personally performed and made by Kim Penn MD. It was created on her behalf by Diallo Andersen, a trained medical scribe. The  creation of this document is based the provider's statements to the medical scribe.  Diallo Andersen August 9, 2017 4:44 PM    OBJECTIVE:   EXAM:  Patient is alert, oriented, cooperative in no acute distress, overweight  BP 96/50  Pulse 85  Temp 98.1  F (36.7  C) (Oral)  Resp 15  Wt 189 lb 11.2 oz (86 kg)  SpO2 100%  BMI 27.61 kg/m2    EXTREMITIES: Right UE - tender to palpation of radial aspect of elbow, no thenar/hypothenar eminence wasting, no pain with active or passive shoulder ROM activities strength preserved   NEUROLOGIC: reflexes 2/4 throughout right UE, strength 5/5 to right UE with pain, sensation grossly intact  SKIN: without rashes or significant lesions to visible skin    Diagnostic Test Results:  No results found for this or any previous visit (from the past 24 hour(s)).    ASSESSMENT/PLAN:     (M77.11) Lateral epicondylitis of right elbow  (primary encounter diagnosis)  Comment: several years; has forearm splint; OK to increase  Aleve 400 mg BID for 2 weeks; consider next step ; hesitant for injection.  Due to the duration and significant symptoms, suspect injection will be beneficial; will defer the possible benefit of PT and Iontophoresis to [facility]  Plan: ORTHO  REFERRAL        See pt instructions - pt to continue with elbow strap, scheduled aleve, and home exercises as directed      Patient Instructions   Follow up with Hand/Sports Medicine     Try taking Aleve 2 tablets twice daily for 10-14 to help with inflammation     Continue with regular use of Tennis Elbow strap - check positioning and proper placement to offload the lateral epicondyle.  Reviewed at appt                 Lateral Epicondylitis (Tennis Elbow)   Rehabilitation Exercises   You may do the stretching exercises right away. You may do the strengthening exercises when stretching is nearly painless.   Stretching exercises     Wrist range of motion: Bend your wrist forward and backward as far as you can. Do 3 sets of 10.      Pronation and supination of the forearm: With your elbow bent 90 , turn your palm upward and hold for 5 seconds. Slowly turn your palm downward and hold for 5 seconds. Make sure you keep your elbow at your side and bent 90  throughout this exercise. Do 3 sets of 10.     Elbow range of motion: Gently bring your palm up toward your shoulder and bend your elbow as far as you can. Then straighten your elbow as far as you can 10 times. Do 3 sets of 10.   Strengthening exercises     Wrist flexion exercise: Hold a can or hammer handle in your hand with your palm facing up. Bend your wrist upward. Slowly lower the weight and return to the starting position. Do 3 sets of 10. Gradually increase the weight of the can or weight you are holding.     Wrist extension exercise: Hold a soup can or hammer handle in your hand with your palm facing down. Slowly bend your wrist upward. Slowly lower the weight down into the starting position. Do 3 sets of 10. Gradually increase the weight of the object you are holding.     Wrist radial deviation strengthening: Put your wrist in the sideways position with your thumb up. Hold a can of soup or a hammer handle and gently bend your wrist up, with the thumb reaching toward the ceiling. Slowly lower to the starting position. Do not move your forearm throughout this exercise. Do 3 sets of 10.     Forearm pronation and supination strengthening: Hold a soup can or hammer handle in your hand and bend your elbow 90 . Slowly rotate your hand with your palm upward and then palm down. Do 3 sets of 10.     Wrist extension (with broom handle): Stand up and hold a broom handle in both hands. With your arms at shoulder level, elbows straight and palms down, roll the broom handle backward in your hand as if you are reeling something in using a broom handle. Do 3 sets of 10.   Written by Nakita Parr, MS, PT, for [employer].   Published by [employer].   This content  is reviewed periodically and is subject to change as new health information becomes available. The information is intended to inform and educate and is not a replacement for medical evaluation, advice, diagnosis or treatment by a healthcare professional.   Sports Medicine Advisor 2003.1 Index  Sports Medicine Advisor 2003.1 Credits   Copyright   2003 [employer]. All rights reserved.                The information in this document, created by the medical scribe for me, accurately reflects the services I personally performed and the decisions made by me. I have reviewed and approved this document for accuracy.   MD Kim Ramirez MD  Internal Medicine   [facility]

## 2017-08-21 ENCOUNTER — RADIANT APPOINTMENT (OUTPATIENT)
Dept: GENERAL RADIOLOGY | Facility: CLINIC | Age: 42
End: 2017-08-21
Attending: PHYSICAL MEDICINE & REHABILITATION
Payer: COMMERCIAL

## 2017-08-21 ENCOUNTER — OFFICE VISIT (OUTPATIENT)
Dept: ORTHOPEDICS | Facility: CLINIC | Age: 42
End: 2017-08-21
Payer: COMMERCIAL

## 2017-08-21 VITALS
HEIGHT: 70 IN | SYSTOLIC BLOOD PRESSURE: 127 MMHG | DIASTOLIC BLOOD PRESSURE: 75 MMHG | WEIGHT: 190 LBS | BODY MASS INDEX: 27.2 KG/M2

## 2017-08-21 DIAGNOSIS — G89.29 CHRONIC ELBOW PAIN, RIGHT: Primary | ICD-10-CM

## 2017-08-21 DIAGNOSIS — G89.29 CHRONIC ELBOW PAIN, RIGHT: ICD-10-CM

## 2017-08-21 DIAGNOSIS — M25.521 CHRONIC ELBOW PAIN, RIGHT: Primary | ICD-10-CM

## 2017-08-21 DIAGNOSIS — M25.521 CHRONIC ELBOW PAIN, RIGHT: ICD-10-CM

## 2017-08-21 PROCEDURE — 73080 X-RAY EXAM OF ELBOW: CPT | Mod: RT | Performed by: PHYSICAL MEDICINE & REHABILITATION

## 2017-08-21 PROCEDURE — 99243 OFF/OP CNSLTJ NEW/EST LOW 30: CPT | Performed by: PHYSICAL MEDICINE & REHABILITATION

## 2017-08-21 NOTE — PROGRESS NOTES
"[facility]   Clinic Visit s Aug 21, 2017    Subjective:  Cassi Ye is a 41 year old right-hand dominant female, who is seen in consultation at the request of Dr. Penn for evaluation of chronic right elbow pain with radiation.    Symptoms began 2 years ago, become constant in the past 2-3 months.  Reports insidious onset without acute precipitating event.  Reports right elbow pain that is located lateral with radiation present to the right thumb/index finger.  Pain is 8/10 in maximal severity and 5/10 currently.  Symptoms are generally worse with using her right arm more (unable to specify which movements) and with leaning on her right elbow and better with use of Naproxen and use of a counter-force strap.  Other treatment has consisted of home exercises with worsening of symptoms.  Denies any consistent numbness/tingling/weakness of the right upper extremity. Denies any previous right elbow surgeries.    Patient's past medical, surgical, social, and family histories are reviewed today.  There are no significant contributory medical issues    Review of Systems:  Constitutional: NEGATIVE for fever, chills, or change in weight  Skin: NEGATIVE for worrisome rashes, moles, or lesions  Neuro: NEGATIVE for consistent weakness of the right upper extremity  MSK: see HPI  Additional 10 point ROS is negative other than symptoms noted above and in HPI    Objective:  /75  Ht 5' 9.5\" (1.765 m)  Wt 190 lb (86.2 kg)  BMI 27.66 kg/m2  General: healthy, alert, and in no distress  Skin: no suspicious lesions or rashes  Psych: mentation appears normal and affect normal/bright  HEENT: no scleral icterus  CV: no pedal edema  Resp: normal respiratory effort without conversational dyspnea   Neuro: sensory exam is within normal limits.  Motor strength as noted below  Lymph: no palpable lymphadenopathy    MSK:    RIGHT ELBOW  Inspection:    No swelling, bruising, discoloration, or obvious deformity " or asymmetry  Palpation:    Tender about the common extensor tendon and common flexor tendon    No tenderness over the lateral epicondyle, medial epicondyle, olecranon bursa, or distal bicep tendon  Active Range of Motion:     Extension normal / flexion normal / pronation normal / supination normal  Strength:    Elbow flexion - 5-/5    Elbow extension - 5/5  Special Tests:    Positive: Pain with resisted wrist flexion, pain with resisted supination, and pain with resisted pronation    Negative: Pain with resisted wrist extension and pain with resisted middle finger extension    Imaging:  Right elbow x-rays (3 views) were ordered, independent visualization of images was performed, and interpreted in the office today  Impression:   1. Negative for fracture, subluxation, joint space narrowing, or other acute osseous abnormality.    ASSESSMENT:  1. Chronic right elbow pain - differential diagnoses includes medial epicondylosis, radial neuropathy, lateral epicondylosis, etc.    PLAN:  1. Activity modification as discussed, including limitation of activities that cause pain/discomfort.  2. Acetaminophen/Ibuprofen/ice as needed for improved pain control.  3. Continue with use of counterforce brace as discussed for further treatment purposes.  4. Formal hand therapy - eccentric wrist flexor/extensor strengthening/stretching and scapular stabilization exercises with use of modalities (kinesiotaping, iontophoresis, etc.) as needed with home exercise prescription.  5. Follow-up in 4-6 weeks for further evaluation/medical care.  If symptoms resolve completely, can follow-up as needed.  Consider MRI of the right elbow without contrast, EMG/NCV of the right upper extremity, etc as deemed appropriate moving forward. Instructed to contact our office should the condition evolve or worsen.    Patient's conditions were thoroughly discussed during today's visit with greater than 50% of the visit spent counseling the patient with total  time spent face-to-face with the patient being 15 minutes.    Rogelio Shafer DO, ANGELITOM  [facility]    Disclaimer: This note consists of symbols derived from keyboarding, dictation and/or voice recognition software. As a result, there may be errors in the script that have gone undetected. Please consider this when interpreting information found in this chart.

## 2017-08-21 NOTE — MR AVS SNAPSHOT
After Visit Summary   8/21/2017    Cassi Ye    MRN: [id number]           Patient Information     Date Of Birth          1975        Visit Information        Provider Department      8/21/2017 4:20 PM Rogelio Shafer DO [facility]        Today's Diagnoses     Chronic elbow pain, right    -  1      Care Instructions    We addressed the following today:    1. Chronic right elbow pain    Activity modification as discussed  Home exercise program as instructed  Hand therapy: [facility] - [phone]  Topical Treatments: Ice  Over the counter medication: Acetaminophen (Tylenol) 1000 mg every 6 hours with food (Maximum of 3000 mg/day)  Naproxen (Aleve) maximum of 440 mg two times a day with food  Other specific instructions: Continue with use of counterforce brace for treatment purposes  Follow up in 4-6 weeks if no improvement of symptoms for further evaluation/medical care (sooner if needed; call direct clinic number [[phone]] at any time with questions or concerns)              Follow-ups after your visit        Additional Services     [facility] PT, HAND, AND CHIROPRACTIC REFERRAL       **This order will print in the [facility] Scheduling Office**    Physical Therapy, Hand Therapy and Chiropractic Care are available through:    *[facility]  *[facility]  *[facility]    Call one number to schedule at any of the above locations: [phone].    Your provider has referred you to: Hand Therapy    Indication/Reason for Referral: Elbow Pain  Onset of Illness: 2 years, worse over the last 2-3 months  Therapy Orders: Evaluate and Treat  Special Programs: None  Special Request: None    Wilmer Guillen      Additional Comments for the Therapist or Chiropractor: Formal hand therapy - eccentric wrist flexor/extensor strengthening/stretching and scapular stabilization exercises with use of modalities  (kinesiotaping, iontophoresis, etc.) as needed with home exercise prescription.    Please be aware that coverage of these services is subject to the terms and limitations of your health insurance plan.  Call member services at your health plan with any benefit or coverage questions.      Please bring the following to your appointment:    *Your personal calendar for scheduling future appointments  *Comfortable clothing                  Follow-up notes from your care team     Return in about 6 weeks (around 10/2/2017).      Your next 10 appointments already scheduled     Aug 31, 2017 12:30 PM [place]HE[place]   [facility] with Laura PRAKASH [facility] [place] [facility] ([facility]  )    [address]  [address]  [facility] [address]   [phone]            Nov 13, 2017  4:15 PM CST   [facility] Kim Penn MD   [facility]    [address] [address] [phone]              Who to contact     If you have questions or need follow up information about today's clinic visit or your schedule please contact [facility] directly at [phone].  Normal or non-critical lab and imaging results will be communicated to you by MyChart, letter or phone within 4 business days after the clinic has received the results. If you do not hear from us within 7 days, please contact the clinic through [facility] or phone. If you have a critical or abnormal lab result, we will notify you by phone as soon as possible.  Submit refill requests through [employer] or call your pharmacy and they will forward the refill request to us. Please allow 3 business days for your refill to be completed.          Additional Information About Your Visit        MyChart Information     [employer] gives you secure access to your electronic health record. If you see a primary care provider, you can also send messages to your care team and make appointments. If  "you have questions, please call your primary care clinic.  If you do not have a primary care provider, please call [phone] and they will assist you.        Care EveryWhere ID     This is your Care EveryWhere ID. This could be used by other organizations to access your [facility] medical records  [id number]        Your Vitals Were     Height BMI (Body Mass Index)                5' 9.5\" (1.765 m) 27.66 kg/m2           Blood Pressure from Last 3 Encounters:   08/21/17 127/75   08/09/17 96/50   04/20/17 110/76    Weight from Last 3 Encounters:   08/21/17 190 lb (86.2 kg)   08/09/17 189 lb 11.2 oz (86 kg)   04/05/17 193 lb 3.2 oz (87.6 kg)              We Performed the Following     [facility], HAND, AND CHIROPRACTIC REFERRAL        Primary Care Provider Office Phone # Fax #    Kim Kimberly Penn [phone]       [address]  [facility] [address]        Equal Access to Services     [facility]: Hadii aad ku hadasho Soomaali, waaxda luqadaha, qaybta kaalmada adeegyada, waxay justin hayniko plascencia . So [facility] [phone].    ATENCIÓN: Si habla español, tiene a robertson disposición servicios gratuitos de asistencia lingüística. Llame al [phone].    We comply with applicable federal civil rights laws and [place] laws. We do not discriminate on the basis of race, color, national origin, age, disability sex, sexual orientation or gender identity.            Thank you!     Thank you for choosing [facility]  for your care. Our goal is always to provide you with excellent care. Hearing back from our patients is one way we can continue to improve our services. Please take a few minutes to complete the written survey that you may receive in the mail after your visit with us. Thank you!             Your Updated Medication List - Protect others around you: Learn how to safely use, store and throw away your medicines at www.disposemymeds.org.          This list is accurate " as of: 8/21/17  4:53 PM.  Always use your most recent med list.                   Brand Name Dispense Instructions for use Diagnosis    levonorgestrel-ethinyl estradiol 0.15-0.03 MG per tablet    SEASONALE    90 tablet    Take 1 tablet by mouth daily    Breakthrough bleeding on OCPs       magnesium 250 MG tablet      Take 1 tablet by mouth 2 times daily.        MAXALT-MLT 10 MG ODT tab   Generic drug:  rizatriptan      Take 10 mg by mouth at onset of headache.    Variants of migraine, not elsewhere classified, without mention of intractable migraine without mention of status migrainosus       multivitamin per tablet      1 TABLET DAILY        naproxen sodium 220 MG tablet    ANAPROX    60 tablet    Take 2 tablets (440 mg) by mouth 2 times daily (with meals)    Lateral epicondylitis of right elbow       nortriptyline 25 MG capsule    PAMELOR     Take 50 mg by mouth At Bedtime.    Variants of migraine, not elsewhere classified, without mention of intractable migraine without mention of status migrainosus       priLOSEC OTC 20 MG tablet   Generic drug:  omeprazole      Take 1 tablet by mouth daily.        VITAMIN B-2 PO      Take 200 mg by mouth 2 times daily.        VITAMIN D (CHOLECALCIFEROL) PO      Take 1,000 Units by mouth daily

## 2017-08-21 NOTE — NURSING NOTE
"Chief Complaint   Patient presents with     Musculoskeletal Problem     R elbow pain with radiation to the radial wrist/hand       Initial /75  Ht 5' 9.5\" (1.765 m)  Wt 190 lb (86.2 kg)  BMI 27.66 kg/m2 Estimated body mass index is 27.66 kg/(m^2) as calculated from the following:    Height as of this encounter: 5' 9.5\" (1.765 m).    Weight as of this encounter: 190 lb (86.2 kg).  Medication Reconciliation: complete     Brian Paul, ATC      "

## 2017-08-21 NOTE — Clinical Note
Dear Cassi Ca saw me at [facility] on Aug 21, 2017.  Please refer to the visit note at your convenience and feel free to contact me should you have any questions.  Sincerely,  Rogelio Shafer DO, [facility]

## 2017-08-21 NOTE — PATIENT INSTRUCTIONS
We addressed the following today:    1. Chronic right elbow pain    Activity modification as discussed  Home exercise program as instructed  Hand therapy: [facility] - [phone]  Topical Treatments: Ice  Over the counter medication: Acetaminophen (Tylenol) 1000 mg every 6 hours with food (Maximum of 3000 mg/day)  Naproxen (Aleve) maximum of 440 mg two times a day with food  Other specific instructions: Continue with use of counterforce brace for treatment purposes  Follow up in 4-6 weeks if no improvement of symptoms for further evaluation/medical care (sooner if needed; call direct clinic number [[phone]] at any time with questions or concerns)

## 2017-08-31 ENCOUNTER — THERAPY VISIT (OUTPATIENT)
Dept: OCCUPATIONAL THERAPY | Facility: CLINIC | Age: 42
End: 2017-08-31
Payer: COMMERCIAL

## 2017-08-31 DIAGNOSIS — M25.521 CHRONIC PAIN OF RIGHT ELBOW: Primary | ICD-10-CM

## 2017-08-31 DIAGNOSIS — G89.29 CHRONIC PAIN OF RIGHT ELBOW: Primary | ICD-10-CM

## 2017-08-31 PROCEDURE — 97112 NEUROMUSCULAR REEDUCATION: CPT | Mod: GO | Performed by: OCCUPATIONAL THERAPIST

## 2017-08-31 PROCEDURE — 97165 OT EVAL LOW COMPLEX 30 MIN: CPT | Mod: GO | Performed by: OCCUPATIONAL THERAPIST

## 2017-08-31 PROCEDURE — 97140 MANUAL THERAPY 1/> REGIONS: CPT | Mod: GO | Performed by: OCCUPATIONAL THERAPIST

## 2017-08-31 NOTE — PROGRESS NOTES
Hand Therapy Initial Evaluation  Current Date:  8/31/2017    Subjective:  Cassi Ye is a 41 year old right hand dominant female.    Diagnosis: R elbow pain  DOI:  2 years ago (Md order date 8/21/17)    Patient reports symptoms of painof the right elbow which occurred due to unknown cause. Since onset symptoms are gradually getting worse. Special tests:  x-ray: clear.  Previous treatment: stretches, aleve, ice packs, elbow strap (helps). General health as reported by patient is good.  Pertinent medical history includes: migraines/headaches.  Medical allergies: penicillin.  Surgical history: none.  Medication history: BCP, nortryptyne.    Occupational Profile Information:  Current occupation is   Currently working in normal job without restrictions  Job Tasks: prolonged sitting, computer work  Prior functional level:  no limitations  Barriers include:none  Mobility: No difficulty  Transportation: drives  Leisure activities/hobbies: gardening, sewing    Upper Extremity Functional Index Score:  SCORE:   Column Totals: /80: 50   (A lower score indicates greater disability.)    Objective:  Pain Level Report: On scale 0-10/10  Date 8/31/2017    side R    Overall 5-8    At Rest 5    With Activity 8-9      Primary Report: location and description  Date 8/31/2017    Side R    Location R LEP    Radiation Distally into the index and thumb dorsally    Pain Quality Sharp, achy    Frequency constant    Duration Evening    Exacerbated by  Lifting, gripping,   pushing    Relieved by Elbow strap, ice, aleve    Progression since onset Gradually worsening       Tenderness: Pain level report on scale 0-10/10  Date 8/31/2017    Side R    Lateral Epicondyle 4-5    PIN 6    ECRB Insertion 1    MEP 5      Palpation Radial Nerve Path: Pain level report on scale 0-10/10  Date 8/31/2017    Side R    Supraspinatus 0    Triangular Interval 3-4    Spiral Groove 0    Radial Head 4    DSRN 4      Range of Motion Elbow and Wrist  AROM (PROM): WFL per pt report    AROM Multi-joint:  Date 8/31/2017 8/31/2017   Side R L   Elbow - FA - Wrist     90 - Neut - Flex 65 68   90 -  Pro  - Flex 68 72   Ext - Neut - Flex 68 67   Ext -  Pro  - Flex 58 72     Resisted Testing: MMT on scale 0-5/5, Pain level report on scale 0-10/10  Date 8/31/2017    Side R    Elbow Ext 5/5, 0/10    Elbow Flex 5/5, 3/10    Sup 4/5, 5-6/10    Pro 4/5, 4-5/10    Wrist Ext 4/5, 2/10    Wrist Flex 4/5, 3/10    Wrist Ext c elbow extended 4/5, 0.10    Middle Finger test 5/5, 0/10      Strength: (Measured in pounds, pain scale 0-10/10)   with Elbow at 90: measured in pounds  Date 8/31/2017        Trials Left Right Left Right Left Right Left Right Left Right Left Right   1 61 48             2               3               Avg               Pain                  with Elbow Extended: measured in pounds  Date 8/31/2017        Trials Left Right Left Right Left Right Left Right Left Right Left Right   1 59 51             2               3               Avg               Pain                 3 Point Pinch  Date 8/31/2017        Trials Left Right Left Right Left Right Left Right Left Right Left Right   1 18 16             2               3               Avg               Pain                 Lateral Pinch  Date 8/31/2017        Trials Left Right Left Right Left Right Left Right Left Right Left Right   1 13 17             2               3               Avg               Pain                 Assessment:  Patient presents with symptoms consistent with diagnosis of chronic right elbow pain,  with conservative intervention.     Patient's limitations or Problem List includes:  Pain, Decreased ROM/motion, Weakness, Decreased  and Tightness in musculature of the right elbow which interferes with the patient's ability to perform Self Care Tasks (eating), Work Tasks, Sleep Patterns, Recreational Activities, Household Chores and Driving  as compared to previous level of  function.    Rehab Potential:  Excellent - Return to full activity, no limitations    Patient will benefit from skilled Occupational Therapy to increase ROM, flexibility,  strength and forearm strength and decrease pain to return to previous activity level and resume normal daily tasks and to reach their rehab potential.    Barriers to Learning:  No barrier    Communication Issues:  Patient appears to be able to clearly communicate and understand verbal and written communication and follow directions correctly.    Chart Review: Chart Review and Brief history including review of medical and/or therapy records relating to the presenting problem    Assessment of Occupational Performance:  5 or more Performance Deficits  Identified Performance Deficits: feeding, child rearing, home establishment and management, meal preparation and cleanup, work and leisure activities      Clinical Decision Making (Complexity): Low complexity    Treatment Explanation:  The following has been discussed with the patient:    RX ordered/plan of care  Anticipated outcomes  Possible risks and side effects    P: Frequency:  1 X week, once daily  Duration:  for 6 weeks    Treatment Plan:    Therapeutic Exercise: AROM of elbow and wrist, PROM with stretch to wrist extensors and flexors,  ECRL strengthening progressing to ECRB strengthening (eccentric progressing to concentric ).   Manual Techniques: Radial head mobilization, interosseous membrane glide, friction massage, Myofascial release of the forearm extensors and flexors  Neuro Re-ed: Active/Passive radial nerve glides  Orthosis:  Wrist cock up orthosis     Home Program:   Exercise: AROM of elbow and wrist, PROM with stretch to wrist extensors and flexors, Active radial nerve glides  MFR to the extensor wad  Activity: Avoid activities that exacerbate pain in the elbow.  Lift with forearms in neutral position.     Discharge Plan:    Achieve all LTG.  Independent in home treatment  program.  Reach maximal therapeutic benefit.    Next Visit:  MFR  Passive stretch  Passive radial nerve glide

## 2017-09-01 PROBLEM — G89.29 CHRONIC PAIN OF RIGHT ELBOW: Status: ACTIVE | Noted: 2017-09-01

## 2017-09-01 PROBLEM — M25.521 CHRONIC PAIN OF RIGHT ELBOW: Status: ACTIVE | Noted: 2017-09-01

## 2017-09-05 ENCOUNTER — THERAPY VISIT (OUTPATIENT)
Dept: OCCUPATIONAL THERAPY | Facility: CLINIC | Age: 42
End: 2017-09-05
Payer: COMMERCIAL

## 2017-09-05 DIAGNOSIS — M25.521 CHRONIC PAIN OF RIGHT ELBOW: ICD-10-CM

## 2017-09-05 DIAGNOSIS — G89.29 CHRONIC PAIN OF RIGHT ELBOW: ICD-10-CM

## 2017-09-05 PROCEDURE — 97140 MANUAL THERAPY 1/> REGIONS: CPT | Mod: GO | Performed by: OCCUPATIONAL THERAPIST

## 2017-09-05 PROCEDURE — 97112 NEUROMUSCULAR REEDUCATION: CPT | Mod: GO | Performed by: OCCUPATIONAL THERAPIST

## 2017-09-13 ENCOUNTER — THERAPY VISIT (OUTPATIENT)
Dept: OCCUPATIONAL THERAPY | Facility: CLINIC | Age: 42
End: 2017-09-13
Payer: COMMERCIAL

## 2017-09-13 DIAGNOSIS — G89.29 CHRONIC PAIN OF RIGHT ELBOW: ICD-10-CM

## 2017-09-13 DIAGNOSIS — M25.521 CHRONIC PAIN OF RIGHT ELBOW: ICD-10-CM

## 2017-09-13 PROCEDURE — 97140 MANUAL THERAPY 1/> REGIONS: CPT | Mod: GO | Performed by: OCCUPATIONAL THERAPIST

## 2017-09-13 PROCEDURE — 97112 NEUROMUSCULAR REEDUCATION: CPT | Mod: GO | Performed by: OCCUPATIONAL THERAPIST

## 2017-09-13 PROCEDURE — 97110 THERAPEUTIC EXERCISES: CPT | Mod: GO | Performed by: OCCUPATIONAL THERAPIST

## 2017-09-27 ENCOUNTER — THERAPY VISIT (OUTPATIENT)
Dept: OCCUPATIONAL THERAPY | Facility: CLINIC | Age: 42
End: 2017-09-27
Payer: COMMERCIAL

## 2017-09-27 DIAGNOSIS — G89.29 CHRONIC PAIN OF RIGHT ELBOW: ICD-10-CM

## 2017-09-27 DIAGNOSIS — M25.521 CHRONIC PAIN OF RIGHT ELBOW: ICD-10-CM

## 2017-09-27 PROCEDURE — 97110 THERAPEUTIC EXERCISES: CPT | Mod: GO | Performed by: OCCUPATIONAL THERAPIST

## 2017-09-27 PROCEDURE — 97140 MANUAL THERAPY 1/> REGIONS: CPT | Mod: GO | Performed by: OCCUPATIONAL THERAPIST

## 2017-09-27 PROCEDURE — 97112 NEUROMUSCULAR REEDUCATION: CPT | Mod: GO | Performed by: OCCUPATIONAL THERAPIST

## 2017-11-13 ENCOUNTER — OFFICE VISIT (OUTPATIENT)
Dept: FAMILY MEDICINE | Facility: CLINIC | Age: 42
End: 2017-11-13
Payer: COMMERCIAL

## 2017-11-13 VITALS
HEART RATE: 85 BPM | WEIGHT: 189.4 LBS | RESPIRATION RATE: 15 BRPM | SYSTOLIC BLOOD PRESSURE: 114 MMHG | OXYGEN SATURATION: 100 % | DIASTOLIC BLOOD PRESSURE: 68 MMHG | BODY MASS INDEX: 27.57 KG/M2 | TEMPERATURE: 98 F

## 2017-11-13 DIAGNOSIS — Z12.31 VISIT FOR SCREENING MAMMOGRAM: ICD-10-CM

## 2017-11-13 DIAGNOSIS — Z30.09 GENERAL COUNSELING FOR PRESCRIPTION OF ORAL CONTRACEPTIVES: ICD-10-CM

## 2017-11-13 DIAGNOSIS — N92.1 BREAKTHROUGH BLEEDING ON OCPS: ICD-10-CM

## 2017-11-13 DIAGNOSIS — Z00.00 ROUTINE GENERAL MEDICAL EXAMINATION AT A HEALTH CARE FACILITY: Primary | ICD-10-CM

## 2017-11-13 PROCEDURE — 99395 PREV VISIT EST AGE 18-39: CPT | Performed by: INTERNAL MEDICINE

## 2017-11-13 RX ORDER — LEVONORGESTREL AND ETHINYL ESTRADIOL 0.15-0.03
1 KIT ORAL DAILY
Qty: 90 TABLET | Refills: 3 | Status: SHIPPED | OUTPATIENT
Start: 2017-11-13 | End: 2018-11-14

## 2017-11-13 ASSESSMENT — PATIENT HEALTH QUESTIONNAIRE - PHQ9
5. POOR APPETITE OR OVEREATING: SEVERAL DAYS
SUM OF ALL RESPONSES TO PHQ QUESTIONS 1-9: 2

## 2017-11-13 ASSESSMENT — ANXIETY QUESTIONNAIRES
5. BEING SO RESTLESS THAT IT IS HARD TO SIT STILL: NOT AT ALL
1. FEELING NERVOUS, ANXIOUS, OR ON EDGE: SEVERAL DAYS
3. WORRYING TOO MUCH ABOUT DIFFERENT THINGS: NOT AT ALL
7. FEELING AFRAID AS IF SOMETHING AWFUL MIGHT HAPPEN: NOT AT ALL
IF YOU CHECKED OFF ANY PROBLEMS ON THIS QUESTIONNAIRE, HOW DIFFICULT HAVE THESE PROBLEMS MADE IT FOR YOU TO DO YOUR WORK, TAKE CARE OF THINGS AT HOME, OR GET ALONG WITH OTHER PEOPLE: NOT DIFFICULT AT ALL
2. NOT BEING ABLE TO STOP OR CONTROL WORRYING: NOT AT ALL
GAD7 TOTAL SCORE: 2
6. BECOMING EASILY ANNOYED OR IRRITABLE: NOT AT ALL

## 2017-11-13 NOTE — PROGRESS NOTES
SUBJECTIVE:   CC: Cassi Ye is an 41 year old woman who presents for preventive health visit.     Physical   Annual:     Getting at least 3 servings of Calcium per day::  Yes    Bi-annual eye exam::  Yes    Dental care twice a year::  Yes    Sleep apnea or symptoms of sleep apnea::  None    Diet::  Regular (no restrictions)    Frequency of exercise::  4-5 days/week    Duration of exercise::  30-45 minutes    Taking medications regularly::  Yes    Medication side effects::  Other    Additional concerns today::  YES  Abnormal Bleeding Problem       Abnormal Vaginal Bleeding     Duration: 6 months     Description (location/character/radiation): increased vaginal bleeding and increased spotting, and periods are heavier       Today's PHQ-2 Score:   PHQ-2 ( 1999 [employer]) 11/10/2017   Q1: Little interest or pleasure in doing things 0   Q2: Feeling down, depressed or hopeless 0   PHQ-2 Score 0   Q1: Little interest or pleasure in doing things Not at all   Q2: Feeling down, depressed or hopeless Not at all   PHQ-2 Score 0     Abuse: Current or Past(Physical, Sexual or Emotional)- No  Do you feel safe in your environment - Yes    Social History   Substance Use Topics     Smoking status: Former Smoker     Packs/day: 0.50     Years: 10.00     Types: Cigarettes     Quit date: 4/13/2006     Smokeless tobacco: Never Used      Comment: Started in 1994     Alcohol use 0.0 oz/week     0 Standard drinks or equivalent per week      Comment: occasional, not while pregnant     The patient does not drink >3 drinks per day nor >7 drinks per week.    Reviewed orders with patient.  Reviewed health maintenance and updated orders accordingly - Yes  Labs reviewed in EPIC    Mammogram not appropriate for this patient based on age.    Pertinent mammograms are reviewed under the imaging tab.  History of abnormal Pap smear: NO - age 30- 65 PAP every 3 years recommended    Reviewed and updated as needed this visit by clinical staff  Tobacco   Allergies  Meds  Med Hx  Surg Hx  Fam Hx  Soc Hx      Reviewed and updated as needed this visit by Provider        Past Medical History:   Diagnosis Date     Pain in joint, pelvic region and thigh      Variants of migraine, not elsewhere classified, without mention of intractable migraine without mention of status migrainosus     Nasal spray      Past Surgical History:   Procedure Laterality Date     C NONSPECIFIC PROCEDURE  12/00    Cryotherapy     ESOPHAGOSCOPY, GASTROSCOPY, DUODENOSCOPY (EGD), COMBINED N/A 4/20/2017    Procedure: COMBINED ESOPHAGOSCOPY, GASTROSCOPY, DUODENOSCOPY (EGD), BIOPSY SINGLE OR MULTIPLE;  ESOPHAGOSCOPY, GASTROSCOPY, DUODENOSCOPY (EGD) with biopsies by cold forcep.  ;  Surgeon: Mario Dey MD;  Location:  GI       Review of Systems   Genitourinary: Positive for menstrual problem.     CONSTITUTIONAL: NEGATIVE for fever, chills, change in weight  INTEGUMENTARY/SKIN: NEGATIVE for worrisome rashes, moles or lesions  EYES: NEGATIVE for vision changes or irritation  ENT: NEGATIVE for ear, mouth and throat problems  RESP: NEGATIVE for significant cough or SOB  BREAST: NEGATIVE for masses, tenderness or discharge  CV: NEGATIVE for chest pain, palpitations or peripheral edema  GI: Hx of GERD - use of omeprazole; NEGATIVE for nausea, abdominal pain, or change in bowel habits   female: POSITIVE for irregular menses, heavy with spotting; Use of OCP - taking levonorgestrel-ethinyl estradiol; normal menses, no unusual urinary symptoms and no unusual vaginal symptoms  MUSCULOSKELETAL: NEGATIVE for significant arthralgias or myalgia  NEURO: Hx of migraine variant - use of nortriptyline and maxalt; NEGATIVE for weakness, dizziness or paresthesias  ENDOCRINE: NEGATIVE for temperature intolerance, skin/hair changes  HEME/ALLERGY/IMMUNE: NEGATIVE for bleeding problems  PSYCHIATRIC: NEGATIVE for changes in mood or affect    This document serves as a record of the services and decisions  personally performed and made by Kim Penn MD. It was created on her behalf by Kenzie Ren, a trained medical scribe. The creation of this document is based on the provider's statements to the medical scribe.   Kenzie Ren, 4:36 PM, November 13, 2017     OBJECTIVE:   /68  Pulse 85  Temp 98  F (36.7  C) (Oral)  Resp 15  Wt 189 lb 6.4 oz (85.9 kg)  LMP 10/22/2017 (Exact Date)  SpO2 100%  BMI 27.57 kg/m2     Physical Exam  GENERAL: healthy, alert and no distress  EYES: Eyes grossly normal to inspection and PERRL  HENT: normal cephalic/atraumatic, ear canals and TM's normal, nose and mouth without ulcers or lesions, oropharynx clear and oral mucous membranes moist  NECK: no adenopathy, no asymmetry, masses, or scars, thyroid normal to palpation and no carotid bruits  RESP: lungs clear to auscultation - no rales, rhonchi or wheezes  BREAST: Clinical breast exam today; normal without masses, tenderness or nipple discharge and no palpable axillary masses or adenopathy  CV: regular rates and rhythm, normal S1 S2, no murmur, peripheral pulses strong and no peripheral edema  ABDOMEN: soft, nontender, without hepatosplenomegaly or masses and bowel sounds normal  MS: extremities normal- no gross deformities noted  SKIN: no suspicious lesions or rashes  NEURO: Normal strength and tone, sensory exam grossly normal and mentation intact  PSYCH: mentation appears normal and affect normal/bright    ASSESSMENT/PLAN:   (Z30.09) General counseling for prescription of oral contraceptives  (primary encounter diagnosis)  Comment: current use of OCP; gets her period once every three months to lessen headaches; pt reports irregular menses, heavy with spotting  Plan: levonorgestrel-ethinyl estradiol (SEASONALE)         0.15-0.03 MG per tablet    (N92.1) Breakthrough bleeding on OCPs  Comment: continue 3 month cycle to lessen headaches; felt to be effective, overall  Plan: levonorgestrel-ethinyl estradiol  "(SEASONALE)         0.15-0.03 MG per tablet    (Z00.00) Routine general medical examination at a health care facility  Comment: HEALTH CARE MAINTENANCE and immunizations reviewed and up to date;   Lab Results   Component Value Date    PAP NIL 11/07/2016   Reassess in 2 years  Plan: Annual preventative visit     (Z12.31) Visit for screening mammogram  Comment: pt turns 42 this Sunday; agrees to schedule a mammogram; order placed.  Plan: *MA Screening Digital Bilateral      COUNSELING:  Reviewed preventive health counseling, as reflected in patient instructions  Special attention given to:        Regular exercise       Healthy diet/nutrition     reports that she quit smoking about 11 years ago. Her smoking use included Cigarettes. She has a 5.00 pack-year smoking history. She has never used smokeless tobacco.    Estimated body mass index is 27.57 kg/(m^2) as calculated from the following:    Height as of 8/21/17: 5' 9.5\" (1.765 m).    Weight as of this encounter: 189 lb 6.4 oz (85.9 kg).   Weight management plan: Discussed and encouraged healthy diet and exercise guidelines.     Counseling Resources:  ATP IV Guidelines  Pooled Cohorts Equation Calculator  Breast Cancer Risk Calculator  FRAX Risk Assessment  ICSI Preventive Guidelines  Dietary Guidelines for Americans, 2010  USDA's MyPlate  ASA Prophylaxis  Lung CA Screening    Kim Penn MD  Internal Medicine  [facility] [place]    The information in this document, created by a medical scribe for me, accurately reflects the services I personally performed and the decisions made by me. I have reviewed and approved this document for accuracy.  Dr. Kim Penn, 4:51 PM, November 13, 2017    Answers for HPI/ROS submitted by the patient on 11/10/2017   PHQ-2 Score: 0    "

## 2017-11-13 NOTE — MR AVS SNAPSHOT
After Visit Summary   11/13/2017    Cassi Ye    MRN: [id number]           Patient Information     Date Of Birth          1975        Visit Information        Provider Department      11/13/2017 4:15 PM Kim Penn MD [facility]        Today's Diagnoses     General counseling for prescription of oral contraceptives    -  1    Breakthrough bleeding on OCPs        Routine general medical examination at a health care facility        Visit for screening mammogram          Care Instructions      Preventive Health Recommendations  Female Ages 26 - 39  Yearly exam:   See your health care provider every year in order to    Review health changes.     Discuss preventive care.      Review your medicines if you your doctor has prescribed any.    Until age 30: Get a Pap test every three years (more often if you have had an abnormal result).    After age 30: Talk to your doctor about whether you should have a Pap test every 3 years or have a Pap test with HPV screening every 5 years.   You do not need a Pap test if your uterus was removed (hysterectomy) and you have not had cancer.  You should be tested each year for STDs (sexually transmitted diseases), if you're at risk.   Talk to your provider about how often to have your cholesterol checked.  If you are at risk for diabetes, you should have a diabetes test (fasting glucose).  Shots: Get a flu shot each year. Get a tetanus shot every 10 years.   Nutrition:     Eat at least 5 servings of fruits and vegetables each day.    Eat whole-grain bread, whole-wheat pasta and brown rice instead of white grains and rice.    Talk to your provider about Calcium and Vitamin D.     Lifestyle    Exercise at least 150 minutes a week (30 minutes a day, 5 days of the week). This will help you control your weight and prevent disease.    Limit alcohol to one drink per day.    No smoking.     Wear sunscreen to prevent skin cancer.    See your  dentist every six months for an exam and cleaning.            Follow-ups after your visit        Future tests that were ordered for you today     Open Future Orders        Priority Expected Expires Ordered    *MA Screening Digital Bilateral Routine  11/13/2018 11/13/2017            Who to contact     If you have questions or need follow up information about today's clinic visit or your schedule please contact [facility] directly at [phone].  Normal or non-critical lab and imaging results will be communicated to you by Platedhart, letter or phone within 4 business days after the clinic has received the results. If you do not hear from us within 7 days, please contact the clinic through [employer] or phone. If you have a critical or abnormal lab result, we will notify you by phone as soon as possible.  Submit refill requests through Yap or call your pharmacy and they will forward the refill request to us. Please allow 3 business days for your refill to be completed.          Additional Information About Your Visit        Platedhart Information     Yap gives you secure access to your electronic health record. If you see a primary care provider, you can also send messages to your care team and make appointments. If you have questions, please call your primary care clinic.  If you do not have a primary care provider, please call [phone] and they will assist you.        Care EveryWhere ID     This is your Care EveryWhere ID. This could be used by other organizations to access your [facility] medical records  [id number]        Your Vitals Were     Pulse Temperature Respirations Last Period Pulse Oximetry BMI (Body Mass Index)    85 98  F (36.7  C) (Oral) 15 10/22/2017 (Exact Date) 100% 27.57 kg/m2       Blood Pressure from Last 3 Encounters:   11/13/17 114/68   08/21/17 127/75   08/09/17 96/50    Weight from Last 3 Encounters:   11/13/17 189 lb 6.4 oz (85.9 kg)   08/21/17 190 lb (86.2 kg)    08/09/17 189 lb 11.2 oz (86 kg)                 Where to get your medicines      These medications were sent to [facility] - [address]  [address] [facility] [address]     Phone:  [phone]     levonorgestrel-ethinyl estradiol 0.15-0.03 MG per tablet          Primary Care Provider Office Phone # Fax #    Kim Penn [phone] [address] [facility] [address]        Equal Access to Services     [facility]: Hadii aad ku hadasho Soomaali, waaxda luqadaha, qaybta kaalmada adeegyada, waxrosaura plascencia . So [facility] [phone].    ATENCIÓN: Si habla español, tiene a robertson disposición servicios gratuitos de asistencia lingüística. [facility] [phone].    We comply with applicable federal civil rights laws and [place] laws. We do not discriminate on the basis of race, color, national origin, age, disability, sex, sexual orientation, or gender identity.            Thank you!     Thank you for choosing [facility]  for your care. Our goal is always to provide you with excellent care. Hearing back from our patients is one way we can continue to improve our services. Please take a few minutes to complete the written survey that you may receive in the mail after your visit with us. Thank you!             Your Updated Medication List - Protect others around you: Learn how to safely use, store and throw away your medicines at www.disposemymeds.org.          This list is accurate as of: 11/13/17  4:47 PM.  Always use your most recent med list.                   Brand Name Dispense Instructions for use Diagnosis    levonorgestrel-ethinyl estradiol 0.15-0.03 MG per tablet    SEASONALE    90 tablet    Take 1 tablet by mouth daily    Breakthrough bleeding on OCPs, General counseling for prescription of oral contraceptives       magnesium 250 MG tablet      Take 1 tablet by mouth 2 times daily.         MAXALT-MLT 10 MG ODT tab   Generic drug:  rizatriptan      Take 10 mg by mouth at onset of headache.    Variants of migraine, not elsewhere classified, without mention of intractable migraine without mention of status migrainosus       multivitamin per tablet      1 TABLET DAILY        nortriptyline 25 MG capsule    PAMELOR     Take 50 mg by mouth At Bedtime.    Variants of migraine, not elsewhere classified, without mention of intractable migraine without mention of status migrainosus       priLOSEC OTC 20 MG tablet   Generic drug:  omeprazole      Take 1 tablet by mouth daily.        VITAMIN B-2 PO      Take 200 mg by mouth 2 times daily.        VITAMIN D (CHOLECALCIFEROL) PO      Take 1,000 Units by mouth daily

## 2017-11-13 NOTE — NURSING NOTE
"Chief Complaint   Patient presents with     Physical     Abnormal Bleeding Problem       Initial /68  Pulse 85  Temp 98  F (36.7  C) (Oral)  Resp 15  Wt 189 lb 6.4 oz (85.9 kg)  LMP 10/22/2017 (Exact Date)  SpO2 100%  BMI 27.57 kg/m2 Estimated body mass index is 27.57 kg/(m^2) as calculated from the following:    Height as of 8/21/17: 5' 9.5\" (1.765 m).    Weight as of this encounter: 189 lb 6.4 oz (85.9 kg).  Medication Reconciliation: complete    "

## 2017-11-14 ASSESSMENT — ANXIETY QUESTIONNAIRES: GAD7 TOTAL SCORE: 2

## 2017-11-22 ENCOUNTER — HOSPITAL ENCOUNTER (OUTPATIENT)
Dept: MAMMOGRAPHY | Facility: CLINIC | Age: 42
Discharge: HOME | End: 2017-11-22
Attending: INTERNAL MEDICINE | Admitting: INTERNAL MEDICINE
Payer: COMMERCIAL

## 2017-11-22 DIAGNOSIS — Z12.31 VISIT FOR SCREENING MAMMOGRAM: ICD-10-CM

## 2017-11-22 PROCEDURE — G0202 SCR MAMMO BI INCL CAD: HCPCS

## 2017-11-28 PROBLEM — M25.521 CHRONIC PAIN OF RIGHT ELBOW: Status: RESOLVED | Noted: 2017-09-01 | Resolved: 2017-11-28

## 2017-11-28 PROBLEM — G89.29 CHRONIC PAIN OF RIGHT ELBOW: Status: RESOLVED | Noted: 2017-09-01 | Resolved: 2017-11-28

## 2018-11-14 ENCOUNTER — OFFICE VISIT (OUTPATIENT)
Dept: FAMILY MEDICINE | Facility: CLINIC | Age: 43
End: 2018-11-14
Payer: COMMERCIAL

## 2018-11-14 VITALS
RESPIRATION RATE: 15 BRPM | TEMPERATURE: 98.5 F | HEIGHT: 70 IN | HEART RATE: 81 BPM | SYSTOLIC BLOOD PRESSURE: 108 MMHG | WEIGHT: 187.5 LBS | OXYGEN SATURATION: 100 % | BODY MASS INDEX: 26.84 KG/M2 | DIASTOLIC BLOOD PRESSURE: 60 MMHG

## 2018-11-14 DIAGNOSIS — Z30.09 GENERAL COUNSELING FOR PRESCRIPTION OF ORAL CONTRACEPTIVES: ICD-10-CM

## 2018-11-14 DIAGNOSIS — N92.1 BREAKTHROUGH BLEEDING ON OCPS: ICD-10-CM

## 2018-11-14 DIAGNOSIS — Z00.00 ROUTINE HISTORY AND PHYSICAL EXAMINATION OF ADULT: Primary | ICD-10-CM

## 2018-11-14 PROCEDURE — 99396 PREV VISIT EST AGE 40-64: CPT | Performed by: INTERNAL MEDICINE

## 2018-11-14 RX ORDER — LEVONORGESTREL AND ETHINYL ESTRADIOL 0.15-0.03
1 KIT ORAL DAILY
Qty: 90 TABLET | Refills: 3 | Status: SHIPPED | OUTPATIENT
Start: 2018-11-14 | End: 2019-11-10

## 2018-11-14 ASSESSMENT — ENCOUNTER SYMPTOMS
EYES NEGATIVE: 1
HEADACHES: 1
MUSCULOSKELETAL NEGATIVE: 1
ABDOMINAL DISTENTION: 0
HEMATOCHEZIA: 0
SHORTNESS OF BREATH: 0
MYALGIAS: 0
DYSURIA: 0
PARESTHESIAS: 0
BREAST MASS: 0
CONSTIPATION: 0
ALLERGIC/IMMUNOLOGIC NEGATIVE: 1
SORE THROAT: 0
NERVOUS/ANXIOUS: 0
DIARRHEA: 0
HEMATOLOGIC/LYMPHATIC NEGATIVE: 1
RECTAL PAIN: 0
FREQUENCY: 1
NAUSEA: 0
WEAKNESS: 0
DIZZINESS: 0
RESPIRATORY NEGATIVE: 1
COUGH: 0
ENDOCRINE NEGATIVE: 1
HEARTBURN: 1
CONSTITUTIONAL NEGATIVE: 1
CHILLS: 0
ABDOMINAL PAIN: 0
ARTHRALGIAS: 0
JOINT SWELLING: 0
PSYCHIATRIC NEGATIVE: 1
PALPITATIONS: 0
FEVER: 0
VOMITING: 0
ANAL BLEEDING: 0
HEMATURIA: 0
CARDIOVASCULAR NEGATIVE: 1

## 2018-11-14 ASSESSMENT — ANXIETY QUESTIONNAIRES
1. FEELING NERVOUS, ANXIOUS, OR ON EDGE: SEVERAL DAYS
7. FEELING AFRAID AS IF SOMETHING AWFUL MIGHT HAPPEN: NOT AT ALL
3. WORRYING TOO MUCH ABOUT DIFFERENT THINGS: SEVERAL DAYS
6. BECOMING EASILY ANNOYED OR IRRITABLE: NOT AT ALL
IF YOU CHECKED OFF ANY PROBLEMS ON THIS QUESTIONNAIRE, HOW DIFFICULT HAVE THESE PROBLEMS MADE IT FOR YOU TO DO YOUR WORK, TAKE CARE OF THINGS AT HOME, OR GET ALONG WITH OTHER PEOPLE: NOT DIFFICULT AT ALL
2. NOT BEING ABLE TO STOP OR CONTROL WORRYING: NOT AT ALL
GAD7 TOTAL SCORE: 2
5. BEING SO RESTLESS THAT IT IS HARD TO SIT STILL: NOT AT ALL

## 2018-11-14 ASSESSMENT — PATIENT HEALTH QUESTIONNAIRE - PHQ9
5. POOR APPETITE OR OVEREATING: NOT AT ALL
SUM OF ALL RESPONSES TO PHQ QUESTIONS 1-9: 0

## 2018-11-14 NOTE — PROGRESS NOTES
SUBJECTIVE:   CC: Cassi Ye is an 42 year old woman who presents for preventive health visit.     Physical   Annual:     Getting at least 3 servings of Calcium per day:  Yes    Bi-annual eye exam:  Yes    Dental care twice a year:  Yes    Sleep apnea or symptoms of sleep apnea:  None    Diet:  Regular (no restrictions)    Frequency of exercise:  6-7 days/week    Duration of exercise:  30-45 minutes    Taking medications regularly:  Yes    Medication side effects:  None    Additional concerns today:  No  Sinus Problem    Pertinent negatives include no chills, no congestion, no ear pain, no sore throat, no cough and no shortness of breath.           Today's PHQ-2 Score:   PHQ-2 ( 1999 [employer]) 11/14/2018   Q1: Little interest or pleasure in doing things 0   Q2: Feeling down, depressed or hopeless 0   PHQ-2 Score 0   Q1: Little interest or pleasure in doing things Not at all   Q2: Feeling down, depressed or hopeless Not at all   PHQ-2 Score 0       Abuse: Current or Past(Physical, Sexual or Emotional)- No  Do you feel safe in your environment - Yes    Social History   Substance Use Topics     Smoking status: Former Smoker     Packs/day: 0.50     Years: 10.00     Types: Cigarettes     Quit date: 4/13/2006     Smokeless tobacco: Never Used      Comment: Started in 1994     Alcohol use 0.0 oz/week     0 Standard drinks or equivalent per week      Comment: occasional, not while pregnant     Alcohol Use 11/14/2018   If you drink alcohol do you typically have greater than 3 drinks per day OR greater than 7 drinks per week? No       Reviewed orders with patient.  Reviewed health maintenance and updated orders accordingly - Yes  Labs reviewed in EPIC  BP Readings from Last 3 Encounters:   11/14/18 108/60   11/13/17 114/68   08/21/17 127/75    Wt Readings from Last 3 Encounters:   11/14/18 187 lb 8 oz (85 kg)   11/13/17 189 lb 6.4 oz (85.9 kg)   08/21/17 190 lb (86.2 kg)                  Patient Active Problem List    Diagnosis     Migraine variant     Blood type A+     GERD (gastroesophageal reflux disease)     CARDIOVASCULAR SCREENING; LDL GOAL LESS THAN 160     Past Surgical History:   Procedure Laterality Date     C NONSPECIFIC PROCEDURE  12/00    Cryotherapy     ESOPHAGOSCOPY, GASTROSCOPY, DUODENOSCOPY (EGD), COMBINED N/A 4/20/2017    Procedure: COMBINED ESOPHAGOSCOPY, GASTROSCOPY, DUODENOSCOPY (EGD), BIOPSY SINGLE OR MULTIPLE;  ESOPHAGOSCOPY, GASTROSCOPY, DUODENOSCOPY (EGD) with biopsies by cold forcep.  ;  Surgeon: Mario Dey MD;  Location:  GI       Social History   Substance Use Topics     Smoking status: Former Smoker     Packs/day: 0.50     Years: 10.00     Types: Cigarettes     Quit date: 4/13/2006     Smokeless tobacco: Never Used      Comment: Started in 1994     Alcohol use 0.0 oz/week     0 Standard drinks or equivalent per week      Comment: occasional, not while pregnant     Family History   Problem Relation Age of Onset     Family History Negative Brother      Lipids Father      Neurologic Disorder Father      migraines     Family History Negative Mother      Breast Cancer Paternal Aunt      Breast Cancer Paternal Grandmother      Blood Disease Other      paternal uncle with leukemia     Cancer Paternal Aunt      ovarian cancer         Current Outpatient Prescriptions   Medication Sig Dispense Refill     levonorgestrel-ethinyl estradiol (SEASONALE) 0.15-0.03 MG per tablet Take 1 tablet by mouth daily 90 tablet 3     magnesium 250 MG tablet Take 1 tablet by mouth 2 times daily.       MULTIVITAMINS OR TABS 1 TABLET DAILY       nortriptyline (PAMELOR) 25 MG capsule Take 50 mg by mouth At Bedtime.       omeprazole (PRILOSEC OTC) 20 MG tablet Take 1 tablet by mouth daily.       Riboflavin (VITAMIN B-2 PO) Take 200 mg by mouth 2 times daily.       rizatriptan (MAXALT-MLT) 10 MG disintegrating tablet Take 10 mg by mouth at onset of headache.       VITAMIN D, CHOLECALCIFEROL, PO Take 1,000 Units by mouth  daily       [DISCONTINUED] levonorgestrel-ethinyl estradiol (SEASONALE) 0.15-0.03 MG per tablet Take 1 tablet by mouth daily 90 tablet 3     Allergies   Allergen Reactions     Penicillins      rash       Patient under age 50, mutual decision reflected in health maintenance.      Pertinent mammograms are reviewed under the imaging tab.  History of abnormal Pap smear: NO - age 30- 65 PAP every 3 years recommended  PAP / HPV Latest Ref Rng & Units 11/7/2016 10/23/2013 2/2/2012   PAP - NIL NIL NIL   HPV 16 DNA NEG Negative - -   HPV 18 DNA NEG Negative - -   OTHER HR HPV NEG Negative - -     Reviewed and updated as needed this visit by clinical staff  Tobacco  Allergies  Meds  Med Hx  Surg Hx  Fam Hx  Soc Hx        Reviewed and updated as needed this visit by Provider        Past Medical History:   Diagnosis Date     Pain in joint, pelvic region and thigh      Variants of migraine, not elsewhere classified, without mention of intractable migraine without mention of status migrainosus     Nasal spray      Past Surgical History:   Procedure Laterality Date     C NONSPECIFIC PROCEDURE  12/00    Cryotherapy     ESOPHAGOSCOPY, GASTROSCOPY, DUODENOSCOPY (EGD), COMBINED N/A 4/20/2017    Procedure: COMBINED ESOPHAGOSCOPY, GASTROSCOPY, DUODENOSCOPY (EGD), BIOPSY SINGLE OR MULTIPLE;  ESOPHAGOSCOPY, GASTROSCOPY, DUODENOSCOPY (EGD) with biopsies by cold forcep.  ;  Surgeon: Mario Dey MD;  Location:  GI       Review of Systems   Constitutional: Negative.  Negative for chills and fever.   HENT: Negative.  Negative for congestion, ear pain, hearing loss and sore throat.    Eyes: Negative.  Negative for visual disturbance.   Respiratory: Negative.  Negative for cough and shortness of breath.    Cardiovascular: Negative.  Negative for chest pain, palpitations and peripheral edema.   Gastrointestinal: Positive for heartburn. Negative for abdominal distention, abdominal pain, anal bleeding, constipation, diarrhea,  "hematochezia, nausea, rectal pain and vomiting.   Endocrine: Negative.    Breasts:  negative.  Negative for tenderness, breast mass and discharge.   Genitourinary: Positive for frequency. Negative for dysuria, genital sores, hematuria, pelvic pain, urgency, vaginal bleeding and vaginal discharge.   Musculoskeletal: Negative.  Negative for arthralgias, joint swelling and myalgias.   Skin: Negative.  Negative for rash.   Allergic/Immunologic: Negative.    Neurological: Positive for headaches. Negative for dizziness, weakness and paresthesias.   Hematological: Negative.    Psychiatric/Behavioral: Negative.  Negative for mood changes. The patient is not nervous/anxious.           OBJECTIVE:   /60  Pulse 81  Temp 98.5  F (36.9  C) (Oral)  Resp 15  Ht 5' 9.5\" (1.765 m)  Wt 187 lb 8 oz (85 kg)  LMP 10/30/2018 (Exact Date)  SpO2 100%  BMI 27.29 kg/m2  Physical Exam  GENERAL: healthy, alert and no distress  HENT: ear canals and TM's normal, nose and mouth without ulcers or lesions  NECK: no adenopathy, no asymmetry, masses, or scars and thyroid normal to palpation  RESP: lungs clear to auscultation - no rales, rhonchi or wheezes  BREAST: normal without masses, tenderness or nipple discharge and no palpable axillary masses or adenopathy  CV: regular rates and rhythm, normal S1 S2, no S3 or S4, no murmur, click or rub, peripheral pulses strong and no peripheral edema  ABDOMEN: soft, nontender, no hepatosplenomegaly, no masses and bowel sounds normal  MS: no gross musculoskeletal defects noted, no edema  SKIN: no suspicious lesions or rashes  NEURO: Normal strength and tone, mentation intact and speech normal  PSYCH: mentation appears normal, affect normal/bright    ASSESSMENT/PLAN:   (Z00.00) Routine history and physical examination of adult  (primary encounter diagnosis)  Comment: HEALTH CARE MAINTENANCE reviewed  Plan:     (N92.1) Breakthrough bleeding on OCPs  Comment: changed to 3 month cycle to lessen " "headaches;  effective  Plan: levonorgestrel-ethinyl estradiol (SEASONALE)         0.15-0.03 MG per tablet          (Z30.09) General counseling for prescription of oral contraceptives  Comment: OCP working well  Plan: levonorgestrel-ethinyl estradiol (SEASONALE)         0.15-0.03 MG per tablet          COUNSELING:  Reviewed preventive health counseling, as reflected in patient instructions       Regular exercise       Healthy diet/nutrition       mammo    BP Readings from Last 1 Encounters:   11/14/18 108/60     Estimated body mass index is 27.29 kg/(m^2) as calculated from the following:    Height as of this encounter: 5' 9.5\" (1.765 m).    Weight as of this encounter: 187 lb 8 oz (85 kg).      Weight management plan: Discussed healthy diet and exercise guidelines and patient will follow up in 12 months in clinic to re-evaluate.     reports that she quit smoking about 12 years ago. Her smoking use included Cigarettes. She has a 5.00 pack-year smoking history. She has never used smokeless tobacco.      Counseling Resources:  ATP IV Guidelines  Pooled Cohorts Equation Calculator  Breast Cancer Risk Calculator  FRAX Risk Assessment  ICSI Preventive Guidelines  Dietary Guidelines for Americans, 2010  USDA's MyPlate  ASA Prophylaxis  Lung CA Screening    Kim Penn MD  Internal Medicine  [facility]  Answers for HPI/ROS submitted by the patient on 11/14/2018   PHQ-2 Score: 0    "

## 2018-11-14 NOTE — MR AVS SNAPSHOT
After Visit Summary   11/14/2018    Cassi Ye    MRN: [id number]           Patient Information     Date Of Birth          1975        Visit Information        Provider Department      11/14/2018 4:15 PM Kim Penn MD [facility]        Today's Diagnoses     Routine history and physical examination of adult    -  1    Breakthrough bleeding on OCPs        General counseling for prescription of oral contraceptives          Care Instructions      Preventive Health Recommendations  Female Ages 40 to 49    Yearly exam:     See your health care provider every year in order to  1. Review health changes.   2. Discuss preventive care.    3. Review your medicines if your doctor prescribed any.      Get a Pap test every three years (unless you have an abnormal result and your provider advises testing more often).      If you get Pap tests with HPV test, you only need to test every 5 years, unless you have an abnormal result. You do not need a Pap test if your uterus was removed (hysterectomy) and you have not had cancer.      You should be tested each year for STDs (sexually transmitted diseases), if you're at risk.     Ask your doctor if you should have a mammogram.      Have a colonoscopy (test for colon cancer) if someone in your family has had colon cancer or polyps before age 50.       Have a cholesterol test every 5 years.       Have a diabetes test (fasting glucose) after age 45. If you are at risk for diabetes, you should have this test every 3 years.    Shots: Get a flu shot each year. Get a tetanus shot every 10 years.     Nutrition:     Eat at least 5 servings of fruits and vegetables each day.    Eat whole-grain bread, whole-wheat pasta and brown rice instead of white grains and rice.    Get adequate Calcium and Vitamin D.      Lifestyle    Exercise at least 150 minutes a week (an average of 30 minutes a day, 5 days a week). This will help you control your weight  and prevent disease.    Limit alcohol to one drink per day.    No smoking.     Wear sunscreen to prevent skin cancer.    See your dentist every six months for an exam and cleaning.          Follow-ups after your visit        Your next 10 appointments already scheduled     Nov 26, 2018 11:50 AM PINKY ADEN SCREENING DIGITAL BILATERAL with [id number]   [facility] ([facility])    [address]  [facility] [id number] [phone]           How do I prepare for my exam? (Food and drink instructions) No Food and Drink Restrictions.  How do I prepare for my exam? (Other instructions) Do not use any powder, lotion or deodorant under your arms or on your breast. If you do, we will ask you to remove it before your exam.  What should I wear: Wear comfortable, two-piece clothing.  How long does the exam take: Most scans will take 15 minutes.  What should I bring: Bring any previous mammograms from other facilities or have them mailed to the breast center.  Do I need a :  No  is needed.  What do I need to tell my doctor: If you have any allergies, tell your care team.  What should I do after the exam: No restrictions, You may resume normal activities.  What is this test: This test is an x-ray of the breast to look for breast disease. The breast is pressed between two plates to flatten and spread the tissue. An X-ray is taken of the breast from different angles.  Who should I call with questions: If you have any questions, please call the Imaging Department where you will have your exam. Directions, parking instructions, and other information is available on our website, [email or web address].  Other information about my exam Three-dimensional (3D) mammograms are available at [facility] locations in [place], [place], [place], [place], [facility], [place], [facility] and [place]. [facility] locations include [place] and the [facility] in  "[place].  Benefits of 3D mammograms include * Improved rate of cancer detection * Decreases your chance of having to go back for more tests, which means fewer: * \"False-positive\" results (This means that there is an abnormal area but it isn't cancer.) * Invasive testing procedures, such as a biopsy or surgery * Can provide clearer images of the breast if you have dense breast tissue.  *3D mammography is an optional exam that anyone can have with a 2D mammogram. It doesn't replace or take the place of a 2D mammogram. 2D mammograms remain an effective screening test for all women.  Not all insurance companies cover the cost of a 3D mammogram. Check with your insurance. Three-dimensional (3D) mammograms are available at [facility] locations in [facility], and [place]. [facility] locations include [place] and [facility] in [place]. Benefits of 3D mammograms include: - Improved rate of cancer detection - Decreases your chance of having to go back for more tests, which means fewer: - \"False-positive\" results (This means that there is an abnormal area but it isn't cancer.) - Invasive testing procedures, such as a biopsy or surgery - Can provide clearer images of the breast if you have dense breast tissue. 3D mammography is an optional exam that anyone can have with a 2D mammogram. It doesn't replace or take the place of a 2D mammogram. 2D mammograms remain an effective screening test for all women.  Not all insurance companies cover the cost of a 3D mammogram. Check with your insurance.              Who to contact     If you have questions or need follow up information about today's clinic visit or your schedule please contact [facility] directly at [phone].  Normal or non-critical lab and imaging results will be communicated to you by MyChart, letter or phone within 4 business days after the clinic has received the " "results. If you do not hear from us within 7 days, please contact the clinic through [employer] or phone. If you have a critical or abnormal lab result, we will notify you by phone as soon as possible.  Submit refill requests through [employer] or call your pharmacy and they will forward the refill request to us. Please allow 3 business days for your refill to be completed.          Additional Information About Your Visit        [employer] Information     [employer] gives you secure access to your electronic health record. If you see a primary care provider, you can also send messages to your care team and make appointments. If you have questions, please call your primary care clinic.  If you do not have a primary care provider, please call [phone] and they will assist you.        Care EveryWhere ID     This is your Care EveryWhere ID. This could be used by other organizations to access your [facility] medical records  [id number]        Your Vitals Were     Pulse Temperature Respirations Height Last Period Pulse Oximetry    81 98.5  F (36.9  C) (Oral) 15 5' 9.5\" (1.765 m) 10/30/2018 (Exact Date) 100%    BMI (Body Mass Index)                   27.29 kg/m2            Blood Pressure from Last 3 Encounters:   11/14/18 108/60   11/13/17 114/68   08/21/17 127/75    Weight from Last 3 Encounters:   11/14/18 187 lb 8 oz (85 kg)   11/13/17 189 lb 6.4 oz (85.9 kg)   08/21/17 190 lb (86.2 kg)              Today, you had the following     No orders found for display         Where to get your medicines      These medications were sent to [facility]  [address] [address]     Phone:  [phone]     levonorgestrel-ethinyl estradiol 0.15-0.03 MG per tablet          Primary Care Provider Office Phone # Fax #    Kim Penn [phone] [address] [facility] [facility] [address]        Equal Access to Services     REYES REYES AH: " Hadii aad ku hadcherylo Soomaali, waaxda luqadaha, qaybta kaalmada tulio, yan hernández. So [facility] [phone].    ATENCIÓN: Si julila marco, tiene a robertson disposición servicios gratuitos de asistencia lingüística. Llame al [phone].    We comply with applicable federal civil rights laws and [place] laws. We do not discriminate on the basis of race, color, national origin, age, disability, sex, sexual orientation, or gender identity.            Thank you!     Thank you for choosing [facility]  for your care. Our goal is always to provide you with excellent care. Hearing back from our patients is one way we can continue to improve our services. Please take a few minutes to complete the written survey that you may receive in the mail after your visit with us. Thank you!             Your Updated Medication List - Protect others around you: Learn how to safely use, store and throw away your medicines at www.disposemymeds.org.          This list is accurate as of 11/14/18  5:07 PM.  Always use your most recent med list.                   Brand Name Dispense Instructions for use Diagnosis    levonorgestrel-ethinyl estradiol 0.15-0.03 MG per tablet    SEASONALE    90 tablet    Take 1 tablet by mouth daily    Breakthrough bleeding on OCPs, General counseling for prescription of oral contraceptives       magnesium 250 MG tablet      Take 1 tablet by mouth 2 times daily.        MAXALT-MLT 10 MG ODT tab   Generic drug:  rizatriptan      Take 10 mg by mouth at onset of headache.    Variants of migraine, not elsewhere classified, without mention of intractable migraine without mention of status migrainosus       multivitamin per tablet      1 TABLET DAILY        nortriptyline 25 MG capsule    PAMELOR     Take 50 mg by mouth At Bedtime.    Variants of migraine, not elsewhere classified, without mention of intractable migraine without mention of status migrainosus       priLOSEC OTC  20 MG tablet   Generic drug:  omeprazole      Take 1 tablet by mouth daily.        VITAMIN B-2 PO      Take 200 mg by mouth 2 times daily.        VITAMIN D (CHOLECALCIFEROL) PO      Take 1,000 Units by mouth daily

## 2018-11-15 ASSESSMENT — ANXIETY QUESTIONNAIRES: GAD7 TOTAL SCORE: 2

## 2018-11-26 ENCOUNTER — HOSPITAL ENCOUNTER (OUTPATIENT)
Dept: MAMMOGRAPHY | Facility: CLINIC | Age: 43
Discharge: HOME | End: 2018-11-26
Attending: INTERNAL MEDICINE | Admitting: INTERNAL MEDICINE
Payer: COMMERCIAL

## 2018-11-26 DIAGNOSIS — Z12.31 VISIT FOR SCREENING MAMMOGRAM: ICD-10-CM

## 2018-11-26 PROCEDURE — 77067 SCR MAMMO BI INCL CAD: CPT

## 2019-01-09 ENCOUNTER — MYC MEDICAL ADVICE (OUTPATIENT)
Dept: FAMILY MEDICINE | Facility: CLINIC | Age: 44
End: 2019-01-09

## 2019-01-09 DIAGNOSIS — M25.521 CHRONIC ELBOW PAIN, RIGHT: Primary | ICD-10-CM

## 2019-01-09 DIAGNOSIS — G89.29 CHRONIC ELBOW PAIN, RIGHT: Primary | ICD-10-CM

## 2019-01-09 NOTE — TELEPHONE ENCOUNTER
Call to pt- she did see Dr. Shafer in August 2017 and went to OT- the pain did get better. The pain is back now in elbow, wrist, and radiates up to shoulder.     Pt tried calling Dr. Shafer's office to reschedule- he is now longer w/ [facility] he went to [facility]. They are saying they need a new referral.     Call to office since she is an established pt she should not need a new referral. She is going to call the office and schedule.     Kimberly SCHULTE RN

## 2019-01-21 ENCOUNTER — OFFICE VISIT (OUTPATIENT)
Dept: ORTHOPEDICS | Facility: CLINIC | Age: 44
End: 2019-01-21
Payer: COMMERCIAL

## 2019-01-21 ENCOUNTER — ANCILLARY PROCEDURE (OUTPATIENT)
Dept: GENERAL RADIOLOGY | Facility: CLINIC | Age: 44
End: 2019-01-21
Payer: COMMERCIAL

## 2019-01-21 VITALS
DIASTOLIC BLOOD PRESSURE: 70 MMHG | HEIGHT: 70 IN | SYSTOLIC BLOOD PRESSURE: 112 MMHG | BODY MASS INDEX: 26.48 KG/M2 | WEIGHT: 185 LBS

## 2019-01-21 DIAGNOSIS — M75.81 TENDINITIS OF RIGHT ROTATOR CUFF: ICD-10-CM

## 2019-01-21 DIAGNOSIS — M25.511 CHRONIC RIGHT SHOULDER PAIN: Primary | ICD-10-CM

## 2019-01-21 DIAGNOSIS — G89.29 CHRONIC RIGHT SHOULDER PAIN: Primary | ICD-10-CM

## 2019-01-21 DIAGNOSIS — G89.29 CHRONIC RIGHT SHOULDER PAIN: ICD-10-CM

## 2019-01-21 DIAGNOSIS — M75.41 IMPINGEMENT SYNDROME OF RIGHT SHOULDER: ICD-10-CM

## 2019-01-21 DIAGNOSIS — M25.511 CHRONIC RIGHT SHOULDER PAIN: ICD-10-CM

## 2019-01-21 PROCEDURE — 73030 X-RAY EXAM OF SHOULDER: CPT | Mod: RT

## 2019-01-21 PROCEDURE — 99214 OFFICE O/P EST MOD 30 MIN: CPT | Performed by: FAMILY MEDICINE

## 2019-01-21 ASSESSMENT — MIFFLIN-ST. JEOR: SCORE: 1576.46

## 2019-01-21 NOTE — LETTER
1/21/2019         RE: Cassi Ye  [address] [place] [address]        Dear Colleague,    Thank you for referring your patient, Cassi Ye, to the [facility]. Please see a copy of my visit note below.    ASSESSMENT & PLAN    1. Chronic right shoulder pain    2. Impingement syndrome of right shoulder    3. Tendinitis of right rotator cuff      Physical therapy: [place] [facility] - [phone]  Keep activity below your shoulder height and lift with your elbow close to body  Reviewed x-rays-no arthritis    Previously seen for right elbow pain by Dr. Shafer which is associated to chronic tendinopathy.  Improved based on exam and no further interventions necessary.    Follow-up if not improving after 4-6 therapy sessions    -----    SUBJECTIVE:  Cassi Ye is a 43 year old right hand dominant female who is seen in follow-up for right arm pain.They were last seen by Dr. Rogelio Shafer of right elbow and forearm pain and was referred to hand therapy and notes that the pain in her elbow and forearm improved and she has continued with home exercises. She notes that over the past 6-9 months the pain has spread up the right arm and into the shoulder. She is unsure which activities or specific motions increase her pain. Symptoms are worse with working (mostly at a computer) and doing arts and crafts with her right arm.     They indicate that their current pain level is 5/10 and can shoot to 8-9/10. Reports pulling sensation in her right shoulder with reaching overhead along with pain sleeping on right shoulder at night. Notes tingling and shooting pain that can start in the bicep region and go down into her radial wrist. They have tried rest/activity avoidance, ice, Aleve and home exercises.      The patient is seen by themselves.    Patient's past medical, surgical, social, and family histories were reviewed today and no changes are noted.    REVIEW OF  "SYSTEMS:  Constitutional: NEGATIVE for fever, chills, change in weight  Skin: NEGATIVE for worrisome rashes, moles or lesions  GI/: NEGATIVE for bowel or bladder changes  Neuro: NEGATIVE for weakness, dizziness or paresthesias    OBJECTIVE:  /70   Ht 1.765 m (5' 9.5\")   Wt 83.9 kg (185 lb)   BMI 26.93 kg/m      General: healthy, alert and in no distress  HEENT: no scleral icterus or conjunctival erythema  Skin: no suspicious lesions or rash. No jaundice.  CV: regular rhythm by palpation, no pedal edema  Resp: normal respiratory effort without conversational dyspnea   Psych: normal mood and affect  Gait: normal steady gait with appropriate coordination and balance  Neuro: normal light touch sensory exam of the extremities.    MSK:  RIGHT SHOULDER  Inspection:    no atrophy  Palpation:    Tender about the AC joint and supraspinatus insertion. Remainder of bony and tendinous landmarks are nontender.  Active Range of Motion:     Abduction 1650, FF 1650, , IR normal.    Strength:    Scapular plane abduction 5-/5, painful,  ER 5-/5, IR 5/5, biceps 5/5, painful, triceps 5/5  Special Tests:    Positive: Rogers', supraspinatus (empty can)     CERVICAL SPINE  Inspection:    normal cervical lordosis present, rounded shoulders, forward head posture  Range of Motion:     Flexion full    Extension full  Strength:    Deltoid (C5) 5/5, biceps (C6) 5/5, wrist extension (C6) 5/5, triceps (C7) 5/5, wrist flexion (C7) 5/5  Special Tests:    Negative: Spurling's (bilateral)    RIGHT ELBOW  Inspection:    No swelling or obvious deformity or asymmetry  Palpation:  Mildly tender about the common extensor tendon. Remainder of bony, ligamentous and tendinous landmarks are nontender.  Range of Motion:     Extension full / flexion full / pronation full / supination full  Strength:    No deficits in flexion, extension, pronation, or supination.  Special Tests:  Negative: Pain with resisted wrist extension, pain with resisted " middle finger extension and tight     Independent visualization of the below image:  XR SHOULDER RT G/E 3 VW 1/21/2019 10:31 AM      HISTORY: access outlet / AC joint; Chronic right shoulder pain;  Chronic right shoulder pain                                                                      IMPRESSION: Negative exam.     VU JOHNSON MD    Patient's conditions were thoroughly discussed during today's visit with greater than 50% of the visit spent counseling the patient with total time spent face-to-face with the patient being 25 minutes.    Tiffanie Blank DO [facility]        Again, thank you for allowing me to participate in the care of your patient.        Sincerely,        Tiffanie Blank DO

## 2019-01-21 NOTE — PROGRESS NOTES
ASSESSMENT & PLAN    1. Chronic right shoulder pain    2. Impingement syndrome of right shoulder    3. Tendinitis of right rotator cuff      Physical therapy: [place] [facility] - [phone]  Keep activity below your shoulder height and lift with your elbow close to body  Reviewed x-rays-no arthritis    Previously seen for right elbow pain by Dr. Shafer which is associated to chronic tendinopathy.  Improved based on exam and no further interventions necessary.    Follow-up if not improving after 4-6 therapy sessions    -----    SUBJECTIVE:  Cassi Ye is a 43 year old right hand dominant female who is seen in follow-up for right arm pain.They were last seen by Dr. Rogelio Shafer of right elbow and forearm pain and was referred to hand therapy and notes that the pain in her elbow and forearm improved and she has continued with home exercises. She notes that over the past 6-9 months the pain has spread up the right arm and into the shoulder. She is unsure which activities or specific motions increase her pain. Symptoms are worse with working (mostly at a computer) and doing arts and crafts with her right arm.     They indicate that their current pain level is 5/10 and can shoot to 8-9/10. Reports pulling sensation in her right shoulder with reaching overhead along with pain sleeping on right shoulder at night. Notes tingling and shooting pain that can start in the bicep region and go down into her radial wrist. They have tried rest/activity avoidance, ice, Aleve and home exercises.      The patient is seen by themselves.    Patient's past medical, surgical, social, and family histories were reviewed today and no changes are noted.    REVIEW OF SYSTEMS:  Constitutional: NEGATIVE for fever, chills, change in weight  Skin: NEGATIVE for worrisome rashes, moles or lesions  GI/: NEGATIVE for bowel or bladder changes  Neuro: NEGATIVE for weakness, dizziness or paresthesias    OBJECTIVE:  /70   Ht 1.765 m  "(5' 9.5\")   Wt 83.9 kg (185 lb)   BMI 26.93 kg/m     General: healthy, alert and in no distress  HEENT: no scleral icterus or conjunctival erythema  Skin: no suspicious lesions or rash. No jaundice.  CV: regular rhythm by palpation, no pedal edema  Resp: normal respiratory effort without conversational dyspnea   Psych: normal mood and affect  Gait: normal steady gait with appropriate coordination and balance  Neuro: normal light touch sensory exam of the extremities.    MSK:  RIGHT SHOULDER  Inspection:    no atrophy  Palpation:    Tender about the AC joint and supraspinatus insertion. Remainder of bony and tendinous landmarks are nontender.  Active Range of Motion:     Abduction 1650, FF 1650, , IR normal.    Strength:    Scapular plane abduction 5-/5, painful,  ER 5-/5, IR 5/5, biceps 5/5, painful, triceps 5/5  Special Tests:    Positive: Rogers', supraspinatus (empty can)     CERVICAL SPINE  Inspection:    normal cervical lordosis present, rounded shoulders, forward head posture  Range of Motion:     Flexion full    Extension full  Strength:    Deltoid (C5) 5/5, biceps (C6) 5/5, wrist extension (C6) 5/5, triceps (C7) 5/5, wrist flexion (C7) 5/5  Special Tests:    Negative: Spurling's (bilateral)    RIGHT ELBOW  Inspection:    No swelling or obvious deformity or asymmetry  Palpation:  Mildly tender about the common extensor tendon. Remainder of bony, ligamentous and tendinous landmarks are nontender.  Range of Motion:     Extension full / flexion full / pronation full / supination full  Strength:    No deficits in flexion, extension, pronation, or supination.  Special Tests:  Negative: Pain with resisted wrist extension, pain with resisted middle finger extension and tight     Independent visualization of the below image:  XR SHOULDER RT G/E 3 VW 1/21/2019 10:31 AM      HISTORY: access outlet / AC joint; Chronic right shoulder pain;  Chronic right shoulder pain                                          "                             IMPRESSION: Negative exam.     VU JOHNSON MD    Patient's conditions were thoroughly discussed during today's visit with greater than 50% of the visit spent counseling the patient with total time spent face-to-face with the patient being 25 minutes.    Tiffanie Blank,  [facility]

## 2019-01-21 NOTE — PATIENT INSTRUCTIONS
1. Chronic right shoulder pain    2. Impingement syndrome of right shoulder    3. Tendinitis of right rotator cuff      Physical therapy: [place] [facility] - [phone]  Keep activity below your shoulder height and lift with your elbow close to body  Reviewed x-rays-no arthritis    Follow-up if not improving after 4-6 therapy sessions

## 2019-01-30 ENCOUNTER — THERAPY VISIT (OUTPATIENT)
Dept: PHYSICAL THERAPY | Facility: CLINIC | Age: 44
End: 2019-01-30
Payer: COMMERCIAL

## 2019-01-30 DIAGNOSIS — M75.81 TENDINITIS OF RIGHT ROTATOR CUFF: ICD-10-CM

## 2019-01-30 DIAGNOSIS — M75.41 IMPINGEMENT SYNDROME OF RIGHT SHOULDER: ICD-10-CM

## 2019-01-30 PROCEDURE — 97161 PT EVAL LOW COMPLEX 20 MIN: CPT | Mod: GP | Performed by: PHYSICAL THERAPIST

## 2019-01-30 PROCEDURE — 97110 THERAPEUTIC EXERCISES: CPT | Mod: GP | Performed by: PHYSICAL THERAPIST

## 2019-01-30 NOTE — PROGRESS NOTES
[place] [facility] Initial Evaluation  Cassi Ye is a 43 year old  female referred to physical therapy by Dr. Blank for treatment of right shoulder pain with Precautions/Restrictions/MD instructions eval and treat       Physical Therapy Initial Evaluation: Subjective History      Injury/Condition Details:  Presenting Complaint Right shoulder pain   Onset Timing/Date April 2018   Mechanism Insidious onset without acute precipitating event      Symptom Behavior Details    Primary Pain Symptoms Location: Right anterior shoulder pain with radiating pain into elbow and forearm  Quality: Achy, burning   Frequency: Intermittent   Worst Pain 9/10   Best Pain 0/10   Symptom Provocators Sleeping, lifting   Symptom Relievers Biofreeze, medication, activity avoidance   Time of day dependent? Worse during the day   Recent symptom change? None      Prior Testing/Intervention for current condition:  Prior Tests X-ray of right shoulder negative for fracture   Prior Treatment None      Lifestyle & General Medical History:  General Health Reported by Patient good   Employment    Usual physical activities  (within past year) Walking   Orthopaedic history None   Notable medical history Migraines/headaches     SHOULDER EVALUATION  Static Posture:  Forward head: Mild Rounded shoulders: Mild Scapular winging: None    Dynamic scapular assessment: Mild upper trap substitution in abduction plane  Flip Sign: Normal     Range of Motion:    AROM Flexion Abduction Flex/ER Ext/IR   Left 180 180 T2 T8   Right 180 180 T2 T9     Strength:  MMT Flex Scaption Abd ER @ 0 IR @ 0 Mid trap Lower trap   Left 5/5 5/5 5/5 5/5 5/5     Right 4+/5 4+/5 4+/5 4/5 5/5       Special Tests  Left Right   Empty Can - -   Drop arm  - -   Osvaldorgason's  - -   Speed's - +   Rogers-Miles - +   Neer - +   Lift-off - -   Apprehension - -   Bradford's - -   Sulcus Sign - -   AC cross body - -                                               Palpation:  Left: unremarkable  Right: Mild tenderness over right bicep tendon    HPI                    Objective:  System    Physical Exam    General     ROS    Assessment/Plan:    Patient is a 43 year old female with right side shoulder complaints.    Patient has the following significant findings with corresponding treatment plan.                Diagnosis 1:  Right shoulder pain  Pain -  manual therapy, splint/taping/bracing/orthotics, self management, education and home program  Decreased strength - therapeutic exercise, therapeutic activities and home program  Decreased proprioception - neuro re-education, therapeutic activities and home program  Impaired muscle performance - neuro re-education and home program    Therapy Evaluation Codes:   1) History comprised of:   Personal factors that impact the plan of care:      None.    Comorbidity factors that impact the plan of care are:      Migraines/headaches.     Medications impacting care: None.  2) Examination of Body Systems comprised of:   Body structures and functions that impact the plan of care:      Shoulder.   Activity limitations that impact the plan of care are:      Dressing, Lifting, Working, Sleeping and Reaching.  3) Clinical presentation characteristics are:   Stable/Uncomplicated.  4) Decision-Making    Low complexity using standardized patient assessment instrument and/or measureable assessment of functional outcome.  Cumulative Therapy Evaluation is: Low complexity.    Previous and current functional limitations:  (See Goal Flow Sheet for this information)    Short term and Long term goals: (See Goal Flow Sheet for this information)     Communication ability:  Patient appears to be able to clearly communicate and understand verbal and written communication and follow directions correctly.  Treatment Explanation - The following has been discussed with the patient:   RX ordered/plan of care  Anticipated outcomes  Possible risks and side  effects  This patient would benefit from PT intervention to resume normal activities.   Rehab potential is good.    Frequency:  1 X week, once daily  Duration:  for 6 weeks  Discharge Plan:  Achieve all LTG.  Independent in home treatment program.  Reach maximal therapeutic benefit.    Please refer to the daily flowsheet for treatment today, total treatment time and time spent performing 1:1 timed codes.

## 2019-02-06 ENCOUNTER — THERAPY VISIT (OUTPATIENT)
Dept: PHYSICAL THERAPY | Facility: CLINIC | Age: 44
End: 2019-02-06
Payer: COMMERCIAL

## 2019-02-06 DIAGNOSIS — M77.8 RIGHT SHOULDER TENDINITIS: ICD-10-CM

## 2019-02-06 DIAGNOSIS — M75.41 IMPINGEMENT SYNDROME OF SHOULDER REGION, RIGHT: Primary | ICD-10-CM

## 2019-02-06 PROBLEM — M75.81 TENDINITIS OF RIGHT ROTATOR CUFF: Status: ACTIVE | Noted: 2019-02-06

## 2019-02-06 PROCEDURE — 97140 MANUAL THERAPY 1/> REGIONS: CPT | Mod: GP | Performed by: PHYSICAL THERAPIST

## 2019-02-06 PROCEDURE — 97110 THERAPEUTIC EXERCISES: CPT | Mod: GP | Performed by: PHYSICAL THERAPIST

## 2019-02-13 ENCOUNTER — MYC MEDICAL ADVICE (OUTPATIENT)
Dept: FAMILY MEDICINE | Facility: CLINIC | Age: 44
End: 2019-02-13

## 2019-02-13 ENCOUNTER — THERAPY VISIT (OUTPATIENT)
Dept: PHYSICAL THERAPY | Facility: CLINIC | Age: 44
End: 2019-02-13
Payer: COMMERCIAL

## 2019-02-13 DIAGNOSIS — M75.81 TENDINITIS OF RIGHT ROTATOR CUFF: ICD-10-CM

## 2019-02-13 DIAGNOSIS — M75.41 IMPINGEMENT SYNDROME OF RIGHT SHOULDER: ICD-10-CM

## 2019-02-13 PROCEDURE — 97140 MANUAL THERAPY 1/> REGIONS: CPT | Mod: GP | Performed by: PHYSICAL THERAPIST

## 2019-02-13 PROCEDURE — 97112 NEUROMUSCULAR REEDUCATION: CPT | Mod: GP | Performed by: PHYSICAL THERAPIST

## 2019-02-13 PROCEDURE — 97110 THERAPEUTIC EXERCISES: CPT | Mod: GP | Performed by: PHYSICAL THERAPIST

## 2019-02-27 ENCOUNTER — THERAPY VISIT (OUTPATIENT)
Dept: PHYSICAL THERAPY | Facility: CLINIC | Age: 44
End: 2019-02-27
Payer: COMMERCIAL

## 2019-02-27 DIAGNOSIS — M75.41 IMPINGEMENT SYNDROME OF RIGHT SHOULDER: ICD-10-CM

## 2019-02-27 DIAGNOSIS — M75.81 TENDINITIS OF RIGHT ROTATOR CUFF: ICD-10-CM

## 2019-02-27 PROCEDURE — 97110 THERAPEUTIC EXERCISES: CPT | Mod: GP | Performed by: PHYSICAL THERAPIST

## 2019-02-27 PROCEDURE — 97140 MANUAL THERAPY 1/> REGIONS: CPT | Mod: GP | Performed by: PHYSICAL THERAPIST

## 2019-02-27 PROCEDURE — 97112 NEUROMUSCULAR REEDUCATION: CPT | Mod: GP | Performed by: PHYSICAL THERAPIST

## 2019-03-08 ENCOUNTER — THERAPY VISIT (OUTPATIENT)
Dept: PHYSICAL THERAPY | Facility: CLINIC | Age: 44
End: 2019-03-08
Payer: COMMERCIAL

## 2019-03-08 DIAGNOSIS — M75.41 IMPINGEMENT SYNDROME OF RIGHT SHOULDER: ICD-10-CM

## 2019-03-08 DIAGNOSIS — M75.81 TENDINITIS OF RIGHT ROTATOR CUFF: ICD-10-CM

## 2019-03-08 PROCEDURE — 97110 THERAPEUTIC EXERCISES: CPT | Mod: GP | Performed by: PHYSICAL THERAPIST

## 2019-03-08 PROCEDURE — 97112 NEUROMUSCULAR REEDUCATION: CPT | Mod: GP | Performed by: PHYSICAL THERAPIST

## 2019-03-15 ENCOUNTER — OFFICE VISIT (OUTPATIENT)
Dept: FAMILY MEDICINE | Facility: CLINIC | Age: 44
End: 2019-03-15
Payer: COMMERCIAL

## 2019-03-15 VITALS
SYSTOLIC BLOOD PRESSURE: 104 MMHG | OXYGEN SATURATION: 99 % | DIASTOLIC BLOOD PRESSURE: 64 MMHG | HEIGHT: 70 IN | WEIGHT: 186 LBS | RESPIRATION RATE: 16 BRPM | TEMPERATURE: 97.7 F | HEART RATE: 88 BPM | BODY MASS INDEX: 26.63 KG/M2

## 2019-03-15 DIAGNOSIS — R23.4 BREAST SKIN CHANGES: Primary | ICD-10-CM

## 2019-03-15 PROCEDURE — 99213 OFFICE O/P EST LOW 20 MIN: CPT | Performed by: PHYSICIAN ASSISTANT

## 2019-03-15 ASSESSMENT — MIFFLIN-ST. JEOR: SCORE: 1581

## 2019-03-15 NOTE — PROGRESS NOTES
SUBJECTIVE:   Cassi Ye is a 43 year old female who presents to clinic today for the following health issues:      Breast Problem      Duration: over one month    Description (location/character/radiation): left breast    Intensity:  Mild-severe, fluctuates every couple of days    Accompanying signs and symptoms: whole left breast is itchy, only on the left side; has a scaly spot around areola that also has some redness around it    No discoloration anywhere else, denies any swelling or lumps    History (similar episodes/previous evaluation): None    Precipitating or alleviating factors: None    Therapies tried and outcome: hydrocortisone, Aquaphor, lotion; nothing has helped    Patient is here today for evaluation of left breast itching  Ongoing for 1 month  She notes there is a small spot on areola that is itchy  She has tried lotion, hydrocortisone cream and Aquaphor periodically without relief  She is not breast feeding  No new soaps, detergents and shampoos  No family hx of breast abnormality         Problem list and histories reviewed & adjusted, as indicated.  Additional history: as documented    Patient Active Problem List   Diagnosis     Migraine variant     Blood type A+     GERD (gastroesophageal reflux disease)     CARDIOVASCULAR SCREENING; LDL GOAL LESS THAN 160     Impingement syndrome of right shoulder     Tendinitis of right rotator cuff     Past Surgical History:   Procedure Laterality Date     C NONSPECIFIC PROCEDURE  12/00    Cryotherapy     ESOPHAGOSCOPY, GASTROSCOPY, DUODENOSCOPY (EGD), COMBINED N/A 4/20/2017    Procedure: COMBINED ESOPHAGOSCOPY, GASTROSCOPY, DUODENOSCOPY (EGD), BIOPSY SINGLE OR MULTIPLE;  ESOPHAGOSCOPY, GASTROSCOPY, DUODENOSCOPY (EGD) with biopsies by cold forcep.  ;  Surgeon: Mario Dey MD;  Location:  GI       Social History     Tobacco Use     Smoking status: Former Smoker     Packs/day: 0.50     Years: 10.00     Pack years: 5.00     Types: Cigarettes      "Last attempt to quit: 2006     Years since quittin.9     Smokeless tobacco: Never Used     Tobacco comment: Started in    Substance Use Topics     Alcohol use: Yes     Alcohol/week: 0.0 oz     Comment: occasional     Family History   Problem Relation Age of Onset     Family History Negative Brother      Lipids Father      Neurologic Disorder Father         migraines     Family History Negative Mother      Breast Cancer Paternal Aunt      Breast Cancer Paternal Grandmother      Blood Disease Other         paternal uncle with leukemia     Cancer Paternal Aunt         ovarian cancer         Current Outpatient Medications   Medication Sig Dispense Refill     levonorgestrel-ethinyl estradiol (SEASONALE) 0.15-0.03 MG per tablet Take 1 tablet by mouth daily 90 tablet 3     magnesium 250 MG tablet Take 1 tablet by mouth 2 times daily.       MULTIVITAMINS OR TABS 1 TABLET DAILY       nortriptyline (PAMELOR) 25 MG capsule Take 50 mg by mouth At Bedtime.       omeprazole (PRILOSEC OTC) 20 MG tablet Take 1 tablet by mouth daily.       Riboflavin (VITAMIN B-2 PO) Take 200 mg by mouth 2 times daily.       rizatriptan (MAXALT-MLT) 10 MG disintegrating tablet Take 10 mg by mouth at onset of headache.       VITAMIN D, CHOLECALCIFEROL, PO Take 1,000 Units by mouth daily       Allergies   Allergen Reactions     Penicillins      rash       Reviewed and updated as needed this visit by clinical staff  Tobacco  Allergies  Meds  Med Hx  Surg Hx  Fam Hx  Soc Hx      Reviewed and updated as needed this visit by Provider         ROS:  Constitutional, HEENT, cardiovascular, pulmonary, gi and gu systems are negative, except as otherwise noted.    OBJECTIVE:     /64 (BP Location: Right arm, Patient Position: Chair, Cuff Size: Adult Regular)   Pulse 88   Temp 97.7  F (36.5  C) (Oral)   Resp 16   Ht 1.765 m (5' 9.5\")   Wt 84.4 kg (186 lb)   LMP 2019 (Approximate)   SpO2 99%   Breastfeeding? No   BMI 27.07 " kg/m    Body mass index is 27.07 kg/m .  GENERAL: healthy, alert and no distress  NECK: no adenopathy, no asymmetry, masses, or scars and thyroid normal to palpation  RESP: lungs clear to auscultation - no rales, rhonchi or wheezes  BREAST: normal without masses, tenderness or nipple discharge, no palpable axillary masses or adenopathy and dry red round patchy skin on left areola medially  CV: regular rate and rhythm, normal S1 S2, no S3 or S4, no murmur, click or rub, no peripheral edema and peripheral pulses strong  MS: no gross musculoskeletal defects noted, no edema    Diagnostic Test Results:  none     ASSESSMENT/PLAN:             1. Breast skin changes  New problem, appears to be dermatitis in nature.   Recommend ammogram to ensure no other abnormality.   May trial Benadryl or Zyrtec to see if reduces itching.  F/U if symptoms worsen or do not improve.  - MA Diagnostic Digital Bilateral; Future    Risks, benefits and alternatives were discussed with patient. Agreeable to the plan of care.      Estrella Pabon PA-C  [facility]

## 2019-03-25 ENCOUNTER — THERAPY VISIT (OUTPATIENT)
Dept: PHYSICAL THERAPY | Facility: CLINIC | Age: 44
End: 2019-03-25
Payer: COMMERCIAL

## 2019-03-25 DIAGNOSIS — M75.41 IMPINGEMENT SYNDROME OF RIGHT SHOULDER: ICD-10-CM

## 2019-03-25 DIAGNOSIS — M75.81 TENDINITIS OF RIGHT ROTATOR CUFF: ICD-10-CM

## 2019-03-25 PROCEDURE — 97110 THERAPEUTIC EXERCISES: CPT | Mod: GP | Performed by: PHYSICAL THERAPIST

## 2019-03-25 PROCEDURE — 97112 NEUROMUSCULAR REEDUCATION: CPT | Mod: GP | Performed by: PHYSICAL THERAPIST

## 2019-04-03 ENCOUNTER — HOSPITAL ENCOUNTER (OUTPATIENT)
Dept: ULTRASOUND IMAGING | Facility: CLINIC | Age: 44
Discharge: HOME | End: 2019-04-03
Attending: PHYSICIAN ASSISTANT | Admitting: PHYSICIAN ASSISTANT
Payer: COMMERCIAL

## 2019-04-03 ENCOUNTER — HOSPITAL ENCOUNTER (OUTPATIENT)
Dept: MAMMOGRAPHY | Facility: CLINIC | Age: 44
End: 2019-04-03
Attending: PHYSICIAN ASSISTANT
Payer: COMMERCIAL

## 2019-04-03 DIAGNOSIS — R23.4 BREAST SKIN CHANGES: ICD-10-CM

## 2019-04-03 PROCEDURE — 77065 DX MAMMO INCL CAD UNI: CPT | Mod: LT

## 2019-04-03 PROCEDURE — 76642 ULTRASOUND BREAST LIMITED: CPT | Mod: LT

## 2019-04-05 ENCOUNTER — HOSPITAL ENCOUNTER (OUTPATIENT)
Dept: MAMMOGRAPHY | Facility: CLINIC | Age: 44
End: 2019-04-05
Attending: PHYSICIAN ASSISTANT
Payer: COMMERCIAL

## 2019-04-05 ENCOUNTER — HOSPITAL ENCOUNTER (OUTPATIENT)
Dept: ULTRASOUND IMAGING | Facility: CLINIC | Age: 44
Discharge: HOME | End: 2019-04-05
Attending: PHYSICIAN ASSISTANT | Admitting: PHYSICIAN ASSISTANT
Payer: COMMERCIAL

## 2019-04-05 DIAGNOSIS — R23.4 BREAST SKIN CHANGES: ICD-10-CM

## 2019-04-05 PROCEDURE — 40000986 MA POST PROCEDURE LEFT

## 2019-04-05 PROCEDURE — 25000125 ZZHC RX 250: Performed by: RADIOLOGY

## 2019-04-05 PROCEDURE — 88305 TISSUE EXAM BY PATHOLOGIST: CPT | Performed by: PHYSICIAN ASSISTANT

## 2019-04-05 PROCEDURE — 27210206 US BREAST BIOPSY CORE NEEDLE LEFT

## 2019-04-05 RX ADMIN — LIDOCAINE HYDROCHLORIDE 5 ML: 10 INJECTION, SOLUTION EPIDURAL; INFILTRATION; INTRACAUDAL; PERINEURAL at 12:04

## 2019-04-05 NOTE — DISCHARGE INSTRUCTIONS
Page 1 of 1  For informational purposes only. Not to replace the advice of your health care provider. Copyright   2010 [facility]. All rights reserved. [employer] [id number] - REV 02/16.  After Your Breast Biopsy   Bleeding or bruising  Slight bruising is normal. If you bleed through the bandage, put direct pressure on the breast for 10 minutes.   If the breast begins to swell, or you have a lot of bleeding after 10 minutes of pressure, call the doctor who ordered your exam. Or, go to the emergency room.   Bandages  Keep your bandage in place until tomorrow morning. Do not get it wet.   If you have small pieces of tape on the skin, leave them in place. They will fall off on their own, or you can remove them after 5 days.   Activity  You may shower the morning after the exam. No heavy activity (lifting, vacuuming) on the day of your exam. You may go back to normal activity the next day, unless you had a lot of bleeding or pain.  Discomfort  You may take Tylenol (acetaminophen) today for pain. Tomorrow, you may take an anti-inflammatory medicine (aspirin, ibuprofen, Motrin, Aleve, Advil), unless your doctor tells you not to.  Wear your bra overnight to support the breast. You may also use an ice pack: Place it over the area for 15-20 minutes several times a day.  Infection  Infection is rare. Symptoms include fever, redness, increasing pain and fluid draining from the biopsy site. If you have any of these symptoms, please call the doctor who ordered your exam.  Results  Results may take up to 5 business days. A nurse or doctor from the [facility] Center will call with your results. We will also send the results to the doctor who ordered your biopsy.  If you have not heard your results in 5 days, please call the Breast Center.   Other instructions  ______________________________________________________________________________________________________________________________  Call your doctor if:    You have  bleeding that lasts more than 10 minutes.    You have pain that cannot be controlled.   You have signs of infection (fever, redness, drainage or other signs).   You have not received your results within 5 days.    Please call the Breast Center nurse navigator at [phone] if you have questions or concerns about your biopsy.

## 2019-04-08 LAB — COPATH REPORT: NORMAL

## 2019-04-08 NOTE — TELEPHONE ENCOUNTER
Pathology report reviewed with breast radiologist Ema. I phoned and informed patient of results showing papilloma. Recommended follow up is surgical consult. This has been arranged for patient and she agrees to see Dr Rodriguez on 4-10-19 at 0915.    Patient states no problems with biopsy site. Questions were answered and my phone number given if she has further questions or concerns.

## 2019-04-09 NOTE — PROGRESS NOTES
HPI:  Cassi Ye is a 43 year old female referred to me by Dr Penn for consultation regarding a new left breast rash and itching.  Cassi had an abnormality found on breast self exam.  She reports that the finding is Unchanged since it was first discovered two months ago.   She reports no discharge, mass and pain in the affected breast.  She reports no discharge, mass and pain in the contralateral, right breast.      Her risk factors for breast cancer include:  positive family history for breast cancer in paternal aunt and grandmother at age 30's and 55 (granmother who  of breast cancer) and postive family history for ovarian cancer in paternal aunt at age late 40's ( of it).  Not aware of any BRCA testing.  Some cousins have been tested.      Past Medical History:   has a past medical history of Pain in joint, pelvic region and thigh and Variants of migraine, not elsewhere classified, without mention of intractable migraine without mention of status migrainosus.     Smoking History:  quit smoking some time ago.    Past Surgical History:  Past Surgical History:   Procedure Laterality Date     C NONSPECIFIC PROCEDURE      Cryotherapy     ESOPHAGOSCOPY, GASTROSCOPY, DUODENOSCOPY (EGD), COMBINED N/A 2017    Procedure: COMBINED ESOPHAGOSCOPY, GASTROSCOPY, DUODENOSCOPY (EGD), BIOPSY SINGLE OR MULTIPLE;  ESOPHAGOSCOPY, GASTROSCOPY, DUODENOSCOPY (EGD) with biopsies by cold forcep.  ;  Surgeon: Mario Dey MD;  Location:  GI      ROS:  10 point ROS is negative except as stated in the History of the Present Illness.    PE:  Vitals: There were no vitals taken for this visit.  General appearance: well-nourished, sitting comfortably, no apparent distress  Neck: Supple   Respirations:  Unlabored  Extremities: Without edema  Neurologic: alert, speech is clear, moves all extremities with good strength  Psychiatric: Mood and affect are appropriate  Skin: Without lesions or rashes  Breast:    Appearance-  Symmetrical with no skin or nipple changes except for subtle areolar rash on the left, lower inner areola (treated rash).  Contour is normal.  Fibrocystic tissue is moderate.  Masses- none   Ecchymosis- left, lateral    Lymph:   No supraclavicular/infraclavicular adenopathy.                   Axilla: Palpable adenopathy: Right - None                                                                Left - None    Imaging:  All films personally reviewed with Cassi   Mammography reveals:  Heterogeneous density.  Left breast with asymmetric parenchymal denisty     US reveals: hypoechoic area measuring 1.2 cm at the 1 o'clock position and 5 cm from the nipple.    Pathology:  Percutaneous core needle biopsy reveals:   FINAL DIAGNOSIS:   Breast, left, 1.4 cm lesion at 1:00, 5 cm from nipple, ultrasound-guided   needle core biopsy.   - Proliferative fibrocystic change with ductal epithelial hyperplasia,   sclerosing adenosis, and papillary   apocrine metaplasia.   - Focal small intraductal papilloma.   - Negative for atypia or malignancy.     Assessment:     Cassi is a 43 year old female with a Left breast itching and subtle areolar rash.  A finding has been biopsied by core needlea at  1 o'clock and 5 cm from the nipple.  This is a biopsy proven  intraductal papilloma.    PLAN:  Recommend seed localized lumpectomy of the papilloma area and a punch biopsy of the areolar skin.  I have asked Cassi to avoid steroid cream so that I can more easily identify the abnormal skin for biopsy.  Especially if she is found to have an underlying malignancy, BRCA testing will need to be done based on her family history on her fathers side of the family.    Paula Rodriguez MD    Please route or send letter to:  Primary Care Provider (PCP)

## 2019-04-10 ENCOUNTER — TELEPHONE (OUTPATIENT)
Dept: SURGERY | Facility: CLINIC | Age: 44
End: 2019-04-10

## 2019-04-10 ENCOUNTER — OFFICE VISIT (OUTPATIENT)
Dept: SURGERY | Facility: CLINIC | Age: 44
End: 2019-04-10
Payer: COMMERCIAL

## 2019-04-10 ENCOUNTER — TELEPHONE (OUTPATIENT)
Dept: FAMILY MEDICINE | Facility: CLINIC | Age: 44
End: 2019-04-10

## 2019-04-10 VITALS
OXYGEN SATURATION: 100 % | HEIGHT: 70 IN | BODY MASS INDEX: 26.63 KG/M2 | DIASTOLIC BLOOD PRESSURE: 78 MMHG | RESPIRATION RATE: 16 BRPM | HEART RATE: 107 BPM | WEIGHT: 186 LBS | SYSTOLIC BLOOD PRESSURE: 122 MMHG

## 2019-04-10 DIAGNOSIS — D24.2 INTRADUCTAL PAPILLOMA OF LEFT BREAST: Primary | ICD-10-CM

## 2019-04-10 DIAGNOSIS — R21 RASH: ICD-10-CM

## 2019-04-10 DIAGNOSIS — D36.9 INTRADUCTAL PAPILLOMA: Primary | ICD-10-CM

## 2019-04-10 PROCEDURE — 99243 OFF/OP CNSLTJ NEW/EST LOW 30: CPT | Performed by: SURGERY

## 2019-04-10 RX ORDER — LEVOFLOXACIN 5 MG/ML
500 INJECTION, SOLUTION INTRAVENOUS EVERY 24 HOURS
Status: CANCELLED | OUTPATIENT
Start: 2019-04-10

## 2019-04-10 ASSESSMENT — MIFFLIN-ST. JEOR: SCORE: 1581

## 2019-04-10 NOTE — PROGRESS NOTES
Breast Patients    BREAST PATIENTS (ALL)    1-Do you have any of the following symptoms? Lump(s) or Mass(es) itching skin issue  2-In which breast are you having the symptoms? left  3-Do you use hormones?  No  4-Have you had a Mammogram? Yes  Where: [place]  Date: 4-3-19   5-Have you ever had a breast cyst drained? No  6-Have you ever had a breast biopsy? Yes  Side: left  Date: 4-5-19  7-Have you ever had a Breast Cancer? No   8-Is there a history of Breast Cancer in your family? Yes   Relationship to you:    Grandmother  Aunt  9-Have you ever had Ovarian Cancer? No  10-Is there a history of Ovarian Cancer in your family? Yes   Relationship to you:    Aunt  11-Summarize your caffeine intake (i.e. coffee, tea, chocolate, soda etc.): 0    BREAST PATIENTS (FEMALE)    12-What age did your periods begin? 15  13-Date your last menstrual period began? Feb 2019  14-Number of full-term pregnancies: 2  15-Your age when your first child was born? 31  16-Did you nurse your children? Yes  17-Are you pregnant now? No  18-Have you begun menopause? No  19-Have you had either ovary removed?No  20-Do you have breast implants? No       HPI      ROS      Physical Exam

## 2019-04-10 NOTE — TELEPHONE ENCOUNTER
Reason for call:  Other   Patient called regarding (reason for call): appointment  Additional comments: Patient needs Pre Op DOS 4/15/19.Soonest with any provider would be the 15th. Can she please be fit in with Dr. Penn?    Phone number to reach patient:  Home number on file [phone] (home)    Best Time:  any    Can we leave a detailed message on this number?  YES

## 2019-04-10 NOTE — LETTER
April 10, 2019       Re: Cassi Ye - 1975    Cassi Ye is a 43 year old female referred to me by Dr Penn for consultation regarding a new left breast rash and itching.  Cassi had an abnormality found on breast self exam.  She reports that the finding is Unchanged since it was first discovered two months ago.   She reports no discharge, mass and pain in the affected breast.  She reports no discharge, mass and pain in the contralateral, right breast.       Her risk factors for breast cancer include:  positive family history for breast cancer in paternal aunt and grandmother at age 30's and 55 (granmother who  of breast cancer) and postive family history for ovarian cancer in paternal aunt at age late 40's ( of it).  Not aware of any BRCA testing.  Some cousins have been tested.      Past Medical History:  has a past medical history of Pain in joint, pelvic region and thigh and Variants of migraine, not elsewhere classified, without mention of intractable migraine without mention of status migrainosus.      Smoking History:  quit smoking some time ago.            ROS:  10 point ROS is negative except as stated in the History of the Present Illness.     PE:  Vitals: There were no vitals taken for this visit.  General appearance: well-nourished, sitting comfortably, no apparent distress  Neck: Supple   Respirations:  Unlabored  Extremities: Without edema  Neurologic: alert, speech is clear, moves all extremities with good strength  Psychiatric: Mood and affect are appropriate  Skin: Without lesions or rashes  Breast:   Appearance-  Symmetrical with no skin or nipple changes except for subtle areolar rash on the left, lower inner areola (treated rash).  Contour is normal.  Fibrocystic tissue is moderate.  Masses- none              Ecchymosis- left, lateral     Lymph:   No supraclavicular/infraclavicular adenopathy.                   Axilla: Palpable adenopathy: Right - None                                                                 Left - None     Imaging:  All films personally reviewed with Cassi   Mammography reveals:  Heterogeneous density.  Left breast with asymmetric parenchymal denisty      US reveals: hypoechoic area measuring 1.2 cm at the 1 o'clock position and 5 cm from the nipple.     Pathology:  Percutaneous core needle biopsy reveals:   FINAL DIAGNOSIS:   Breast, left, 1.4 cm lesion at 1:00, 5 cm from nipple, ultrasound-guided   needle core biopsy.   - Proliferative fibrocystic change with ductal epithelial hyperplasia,   sclerosing adenosis, and papillary   apocrine metaplasia.   - Focal small intraductal papilloma.   - Negative for atypia or malignancy.     Assessment:     Cassi is a 43 year old female with a Left breast itching and subtle areolar rash.  A finding has been biopsied by core needlea at  1 o'clock and 5 cm from the nipple.  This is a biopsy proven  intraductal papilloma.     PLAN:  Recommend seed localized lumpectomy of the papilloma area and a punch biopsy of the areolar skin. I have asked Cassi to avoid steroid cream so that I can more easily identify the abnormal skin for biopsy.  Especially if she is found to have an underlying malignancy, BRCA testing will need to be done based on her family history on her fathers side of the family.     Paula Rodriguez MD

## 2019-04-10 NOTE — TELEPHONE ENCOUNTER
Type of surgery: LEFT BREAST SEED LOCALIZED BREAST BIOPSY, LEFT AREOLAR PUNCH BIOPSY     Location of surgery: [facility]  Date and time of surgery: 4/15/2019 @ 12:15 PM   Surgeon: DION SWAIN MD   Pre-Op Appt Date: PATIENT TO SCHEDULE    Post-Op Appt Date: PATIENT TO SCHEDULE     Packet sent out: Yes  PACKET AND SOAP GIVEN TO PATIENT  Pre-cert/Authorization completed:  Not Applicable  Date: 4/10/2019     LEFT BREAST SEED LOCALIZED BREAST BIOPSY, LEFT AREOLAR PUNCH BIOPSY     MAC PT INST TO HAVE H&P WITH DR HUGHES 60 MIN REQ PA IF AVAIL NLG NMS SEED AT 8:30 NMS

## 2019-04-11 ENCOUNTER — OFFICE VISIT (OUTPATIENT)
Dept: FAMILY MEDICINE | Facility: CLINIC | Age: 44
End: 2019-04-11
Payer: COMMERCIAL

## 2019-04-11 VITALS
HEART RATE: 100 BPM | OXYGEN SATURATION: 100 % | SYSTOLIC BLOOD PRESSURE: 110 MMHG | DIASTOLIC BLOOD PRESSURE: 70 MMHG | WEIGHT: 189.4 LBS | TEMPERATURE: 97.8 F | BODY MASS INDEX: 27.57 KG/M2 | RESPIRATION RATE: 16 BRPM

## 2019-04-11 DIAGNOSIS — K21.9 GASTROESOPHAGEAL REFLUX DISEASE, ESOPHAGITIS PRESENCE NOT SPECIFIED: ICD-10-CM

## 2019-04-11 DIAGNOSIS — N64.59 ABNORMAL CELLS IN BREAST: ICD-10-CM

## 2019-04-11 DIAGNOSIS — Z67.10 BLOOD TYPE A+: ICD-10-CM

## 2019-04-11 DIAGNOSIS — Z01.818 PREOP GENERAL PHYSICAL EXAM: Primary | ICD-10-CM

## 2019-04-11 DIAGNOSIS — G43.809 MIGRAINE VARIANT: ICD-10-CM

## 2019-04-11 PROCEDURE — 99214 OFFICE O/P EST MOD 30 MIN: CPT | Performed by: INTERNAL MEDICINE

## 2019-04-11 NOTE — PROGRESS NOTES
[facility]  [id number] [id number] [phone]  Dept: [phone]    PRE-OP EVALUATION:  Today's date: 2019    Cassi Ye (: 1975) presents for pre-operative evaluation assessment as requested by Dr. Paula Rodriguez.  She requires evaluation and anesthesia risk assessment prior to undergoing surgery/procedure for treatment of Left breast abnormal cells .    Fax number for surgical facility:   Primary Physician: Kim Penn  Type of Anesthesia Anticipated: Local with MAC    Patient has a Health Care Directive or Living Will:  NO    Preop Questions 2019   Who is doing your surgery? Paula Rodriguez   What are you having done? left breast and aerola surgical biopsy   Date of Surgery/Procedure: 4/15/19   Facility or Hospital where procedure/surgery will be performed: [facility]    1.  Do you have a history of Heart attack, stroke, stent, coronary bypass surgery, or other heart surgery? No   2.  Do you ever have any pain or discomfort in your chest? No   3.  Do you have a history of  Heart Failure? No   4.   Are you troubled by shortness of breath when:  walking on a level surface, or up a slight hill, or at night? No   5.  Do you currently have a cold, bronchitis or other respiratory infection? No   6.  Do you have a cough, shortness of breath, or wheezing? No   7.  Do you sometimes get pains in the calves of your legs when you walk? No   8. Do you or anyone in your family have previous history of blood clots? No   9.  Do you or does anyone in your family have a serious bleeding problem such as prolonged bleeding following surgeries or cuts? No   10. Have you ever had problems with anemia or been told to take iron pills? No   11. Have you had any abnormal blood loss such as black, tarry or bloody stools, or abnormal vaginal bleeding? No   12. Have you ever had a blood transfusion? No   13. Have you or any of your relatives ever  had problems with anesthesia? YES - Mother  Nausea and vomiting    14. Do you have sleep apnea, excessive snoring or daytime drowsiness? No   15. Do you have any prosthetic heart valves? No   16. Do you have prosthetic joints? No   17. Is there any chance that you may be pregnant? No         HPI:     HPI related to upcoming procedure: Cassi Ye was seen in clinic 3/15/2019 due to changes in her left breast. Further evaluation resulted in a biopsy which noted abnormal cells. She is now scheduled for radioactive seed placement and removal of these abnormal cells.      See problem list for active medical problems.  Problems all longstanding and stable, except as noted/documented.  See ROS for pertinent symptoms related to these conditions.                                                                                                                                                          .    MEDICAL HISTORY:     Patient Active Problem List    Diagnosis Date Noted     Impingement syndrome of right shoulder 02/06/2019     Priority: Medium     Tendinitis of right rotator cuff 02/06/2019     Priority: Medium     CARDIOVASCULAR SCREENING; LDL GOAL LESS THAN 160 02/10/2010     Priority: Medium     GERD (gastroesophageal reflux disease) 11/09/2009     Priority: Medium     Blood type A+ 09/11/2009     Priority: Medium     Migraine variant 05/31/2005     Priority: Medium     Nasal spray  Followed by Dr. Loving, [facility]; 8/2011  Doing well on Nortriptyline 50 mg; 2 headaches per week  Problem list name updated by automated process. Provider to review        Past Medical History:   Diagnosis Date     Gastroesophageal reflux disease      Pain in joint, pelvic region and thigh      Variants of migraine, not elsewhere classified, without mention of intractable migraine without mention of status migrainosus     Nasal spray     Past Surgical History:   Procedure Laterality Date     C NONSPECIFIC PROCEDURE       Cryotherapy     ESOPHAGOSCOPY, GASTROSCOPY, DUODENOSCOPY (EGD), COMBINED N/A 2017    Procedure: COMBINED ESOPHAGOSCOPY, GASTROSCOPY, DUODENOSCOPY (EGD), BIOPSY SINGLE OR MULTIPLE;  ESOPHAGOSCOPY, GASTROSCOPY, DUODENOSCOPY (EGD) with biopsies by cold forcep.  ;  Surgeon: Mario Dey MD;  Location:  GI     Current Outpatient Medications   Medication Sig Dispense Refill     levonorgestrel-ethinyl estradiol (SEASONALE) 0.15-0.03 MG per tablet Take 1 tablet by mouth daily 90 tablet 3     magnesium 250 MG tablet Take 1 tablet by mouth 2 times daily.       MULTIVITAMINS OR TABS 1 TABLET DAILY       nortriptyline (PAMELOR) 25 MG capsule Take 50 mg by mouth At Bedtime.       omeprazole (PRILOSEC OTC) 20 MG tablet Take 1 tablet by mouth daily.       Riboflavin (VITAMIN B-2 PO) Take 200 mg by mouth 2 times daily.       rizatriptan (MAXALT-MLT) 10 MG disintegrating tablet Take 10 mg by mouth at onset of headache.       VITAMIN D, CHOLECALCIFEROL, PO Take 1,000 Units by mouth daily       OTC products: None, except as noted above    Allergies   Allergen Reactions     Penicillins      rash      Latex Allergy: NO    Social History     Tobacco Use     Smoking status: Former Smoker     Packs/day: 0.50     Years: 10.00     Pack years: 5.00     Types: Cigarettes     Last attempt to quit: 2006     Years since quittin.0     Smokeless tobacco: Never Used     Tobacco comment: Started in    Substance Use Topics     Alcohol use: Yes     Alcohol/week: 0.0 oz     Comment: 1 -2 drinks month     History   Drug Use No       REVIEW OF SYSTEMS:   CONSTITUTIONAL: NEGATIVE for fever, chills, change in weight  INTEGUMENTARY/SKIN: NEGATIVE for worrisome rashes, moles or lesions  ENT/MOUTH: NEGATIVE for ear, mouth and throat problems  RESP: NEGATIVE for significant cough or SOB  BREAST: see HPI  CV: NEGATIVE for chest pain, palpitations or peripheral edema  GI: chronic constipation and reflux; she is using Magnesium  tablets and daily PPI with good results.   GYN: using daily oral contraceptive.   : NEGATIVE for frequency, dysuria, or hematuria  MUSCULOSKELETAL: NEGATIVE for significant arthralgias or myalgia  NEURO: chronic headaches, taking nortriptyline daily and Imitrex as needed for migraine headaches  ENDOCRINE: NEGATIVE for temperature intolerance, skin/hair changes  HEME: NEGATIVE for bleeding problems  PSYCHIATRIC: NEGATIVE for changes in mood or affect    EXAM:   /70   Pulse 100   Temp 97.8  F (36.6  C) (Oral)   Resp 16   Wt 85.9 kg (189 lb 6.4 oz)   SpO2 100%   BMI 27.57 kg/m      GENERAL APPEARANCE: healthy, alert and no distress     EYES: EOMI, PERRL     HENT: ear canals and TM's normal and nose and mouth without ulcers or lesions     NECK: no adenopathy, no asymmetry, masses, or scars and thyroid normal to palpation     RESP: lungs clear to auscultation - no rales, rhonchi or wheezes     BREAST: defer to surgery     CV: regular rates and rhythm, normal S1 S2, no S3 or S4 and no murmur, click or rub     ABDOMEN:  soft, nontender, no HSM or masses and bowel sounds normal     MS: extremities normal- no gross deformities noted, no evidence of inflammation in joints, FROM in all extremities.     SKIN: no suspicious lesions or rashes     NEURO: Normal strength and tone, sensory exam grossly normal, mentation intact and speech normal     PSYCH: mentation appears normal. and affect normal/bright     LYMPHATICS: No cervical adenopathy    DIAGNOSTICS:   No labs or EKG required for low risk surgery (cataract, skin procedure, breast biopsy, etc)    Recent Labs   Lab Test 11/02/15  1716      POTASSIUM 3.8   CR 0.77        IMPRESSION:   Reason for surgery/procedure: abnormal breast cells noted on biopsy  Diagnosis/reason for consult: abnormal breast cells     The proposed surgical procedure is considered LOW risk.    REVISED CARDIAC RISK INDEX  The patient has the following serious cardiovascular risks for  perioperative complications such as (MI, PE, VFib and 3  AV Block):  No serious cardiac risks  INTERPRETATION: 0 risks: Class I (very low risk - 0.4% complication rate)    The patient has the following additional risks for perioperative complications:  No identified additional risks      ICD-10-CM    1. Preop general physical exam Z01.818    2. Abnormal cells in breast N64.59    3. Migraine variant G43.809    4. Gastroesophageal reflux disease, esophagitis presence not specified K21.9    5. Blood type A+ Z67.10        RECOMMENDATIONS:     .--Approval given to proceed with proposed procedure, without further diagnostic evaluation    Medications reviewed:  Pt advised to avoid NSAIDS and multivitamins until after surgery. (Motrin, Ibuprofen, Aleve or Naprosyn);  If needed, Tylenol or Acetaminophen are fine to use.    Other meds taken at night can continue without interruption.   ( Nortriptyline, OCP, omeprazole, magnesium )    Nothing to eat or drink after midnight evening prior to radioactive seed placement and surgery.    --Pain medications, activity restrictions, time off from work and FMLA following surgery deferred to surgeon.     APPROVAL GIVEN to proceed with proposed procedure, without further diagnostic evaluation       Signed Electronically by: MD Kim Ramirez MD  Internal Medicine  electronically signed      Copy of this evaluation report is provided to requesting physician.    [facility] Preop Guidelines    Revised Cardiac Risk Index

## 2019-04-11 NOTE — PATIENT INSTRUCTIONS
Before Your Surgery      Call your surgeon if there is any change in your health. This includes signs of a cold or flu (such as a sore throat, runny nose, cough, rash or fever).    Do not smoke, drink alcohol or take over the counter medicine (unless your surgeon or primary care doctor tells you to) for the 24 hours before and after surgery.    If you take prescribed drugs: Follow your doctor s orders about which medicines to take and which to stop until after surgery.    Eating and drinking prior to surgery: follow the instructions from your surgeon    Take a shower or bath the night before surgery. Use the soap your surgeon gave you to gently clean your skin. If you do not have soap from your surgeon, use your regular soap. Do not shave or scrub the surgery site.  Wear clean pajamas and have clean sheets on your bed.     Medications reviewed:  Pt advised to avoid NSAIDS and multivitamins until after surgery. (Motrin, Ibuprofen, Aleve or Naprosyn);  If needed, Tylenol or Acetaminophen are fine to use.    Other meds taken at night can continue without interruption.   ( Nortriptyline, OCP, omeprazole, magnesium )    Nothing to eat or drink after midnight evening prior to radioactive seed placement and surgery.

## 2019-04-15 ENCOUNTER — APPOINTMENT (OUTPATIENT)
Dept: MAMMOGRAPHY | Facility: CLINIC | Age: 44
End: 2019-04-15
Attending: SURGERY
Payer: COMMERCIAL

## 2019-04-15 ENCOUNTER — ANESTHESIA (OUTPATIENT)
Dept: SURGERY | Facility: CLINIC | Age: 44
End: 2019-04-15
Payer: COMMERCIAL

## 2019-04-15 ENCOUNTER — HOSPITAL ENCOUNTER (OUTPATIENT)
Dept: ULTRASOUND IMAGING | Facility: CLINIC | Age: 44
End: 2019-04-15
Attending: SURGERY | Admitting: SURGERY
Payer: COMMERCIAL

## 2019-04-15 ENCOUNTER — APPOINTMENT (OUTPATIENT)
Dept: SURGERY | Facility: PHYSICIAN GROUP | Age: 44
End: 2019-04-15
Payer: COMMERCIAL

## 2019-04-15 ENCOUNTER — HOSPITAL ENCOUNTER (OUTPATIENT)
Facility: CLINIC | Age: 44
Discharge: HOME | End: 2019-04-15
Attending: SURGERY | Admitting: SURGERY
Payer: COMMERCIAL

## 2019-04-15 ENCOUNTER — DOCUMENTATION ONLY (OUTPATIENT)
Dept: MAMMOGRAPHY | Facility: CLINIC | Age: 44
End: 2019-04-15

## 2019-04-15 ENCOUNTER — ANESTHESIA EVENT (OUTPATIENT)
Dept: SURGERY | Facility: CLINIC | Age: 44
End: 2019-04-15
Payer: COMMERCIAL

## 2019-04-15 ENCOUNTER — HOSPITAL ENCOUNTER (OUTPATIENT)
Dept: MAMMOGRAPHY | Facility: CLINIC | Age: 44
End: 2019-04-15
Attending: SURGERY | Admitting: SURGERY
Payer: COMMERCIAL

## 2019-04-15 VITALS
WEIGHT: 188 LBS | HEIGHT: 69 IN | DIASTOLIC BLOOD PRESSURE: 85 MMHG | RESPIRATION RATE: 14 BRPM | SYSTOLIC BLOOD PRESSURE: 107 MMHG | TEMPERATURE: 97.3 F | OXYGEN SATURATION: 99 % | BODY MASS INDEX: 27.85 KG/M2 | HEART RATE: 85 BPM

## 2019-04-15 DIAGNOSIS — D36.9 INTRADUCTAL PAPILLOMA: Primary | ICD-10-CM

## 2019-04-15 DIAGNOSIS — D24.2 INTRADUCTAL PAPILLOMA OF LEFT BREAST: ICD-10-CM

## 2019-04-15 LAB — HCG UR QL: NEGATIVE

## 2019-04-15 PROCEDURE — 36000054 ZZH SURGERY LEVEL 2 W FLUORO 1ST 30 MIN: Performed by: SURGERY

## 2019-04-15 PROCEDURE — 88312 SPECIAL STAINS GROUP 1: CPT | Performed by: SURGERY

## 2019-04-15 PROCEDURE — 71000027 ZZH RECOVERY PHASE 2 EACH 15 MINS: Performed by: SURGERY

## 2019-04-15 PROCEDURE — 25000128 H RX IP 250 OP 636

## 2019-04-15 PROCEDURE — 88307 TISSUE EXAM BY PATHOLOGIST: CPT | Performed by: SURGERY

## 2019-04-15 PROCEDURE — 19285 PERQ DEV BREAST 1ST US IMAG: CPT | Mod: LT

## 2019-04-15 PROCEDURE — 25000125 ZZHC RX 250: Performed by: RADIOLOGY

## 2019-04-15 PROCEDURE — 25000125 ZZHC RX 250: Performed by: SURGERY

## 2019-04-15 PROCEDURE — 40000268 MA BREAST SPECIMEN LEFT OR

## 2019-04-15 PROCEDURE — 37000009 ZZH ANESTHESIA TECHNICAL FEE, EACH ADDTL 15 MIN: Performed by: SURGERY

## 2019-04-15 PROCEDURE — 40000306 ZZH STATISTIC PRE PROC ASSESS II: Performed by: SURGERY

## 2019-04-15 PROCEDURE — 19125 EXCISION BREAST LESION: CPT | Performed by: SURGERY

## 2019-04-15 PROCEDURE — 11105 PUNCH BX SKIN EA SEP/ADDL: CPT | Performed by: SURGERY

## 2019-04-15 PROCEDURE — 88305 TISSUE EXAM BY PATHOLOGIST: CPT | Performed by: SURGERY

## 2019-04-15 PROCEDURE — 25000128 H RX IP 250 OP 636: Performed by: SURGERY

## 2019-04-15 PROCEDURE — 11104 PUNCH BX SKIN SINGLE LESION: CPT | Performed by: SURGERY

## 2019-04-15 PROCEDURE — 27210794 ZZH OR GENERAL SUPPLY STERILE: Performed by: SURGERY

## 2019-04-15 PROCEDURE — 25800030 ZZH RX IP 258 OP 636: Performed by: ANESTHESIOLOGY

## 2019-04-15 PROCEDURE — 25000125 ZZHC RX 250: Performed by: ANESTHESIOLOGY

## 2019-04-15 PROCEDURE — 37000008 ZZH ANESTHESIA TECHNICAL FEE, 1ST 30 MIN: Performed by: SURGERY

## 2019-04-15 PROCEDURE — 25000132 ZZH RX MED GY IP 250 OP 250 PS 637: Performed by: SURGERY

## 2019-04-15 PROCEDURE — 40000986 MA POST PROCEDURE LEFT

## 2019-04-15 PROCEDURE — 81025 URINE PREGNANCY TEST: CPT | Performed by: ANESTHESIOLOGY

## 2019-04-15 PROCEDURE — 36000052 ZZH SURGERY LEVEL 2 EA 15 ADDTL MIN: Performed by: SURGERY

## 2019-04-15 RX ORDER — FENTANYL CITRATE 50 UG/ML
25-50 INJECTION, SOLUTION INTRAMUSCULAR; INTRAVENOUS
Status: DISCONTINUED | OUTPATIENT
Start: 2019-04-15 | End: 2019-04-15 | Stop reason: HOSPADM

## 2019-04-15 RX ORDER — ONDANSETRON 4 MG/1
4 TABLET, ORALLY DISINTEGRATING ORAL EVERY 30 MIN PRN
Status: DISCONTINUED | OUTPATIENT
Start: 2019-04-15 | End: 2019-04-15 | Stop reason: HOSPADM

## 2019-04-15 RX ORDER — HYDROCODONE BITARTRATE AND ACETAMINOPHEN 5; 325 MG/1; MG/1
2 TABLET ORAL
Status: COMPLETED | OUTPATIENT
Start: 2019-04-15 | End: 2019-04-15

## 2019-04-15 RX ORDER — LIDOCAINE 40 MG/G
CREAM TOPICAL
Status: DISCONTINUED | OUTPATIENT
Start: 2019-04-15 | End: 2019-04-15 | Stop reason: HOSPADM

## 2019-04-15 RX ORDER — HYDROCODONE BITARTRATE AND ACETAMINOPHEN 5; 325 MG/1; MG/1
1-2 TABLET ORAL EVERY 4 HOURS PRN
Qty: 20 TABLET | Refills: 0 | Status: SHIPPED | OUTPATIENT
Start: 2019-04-15 | End: 2019-05-01

## 2019-04-15 RX ORDER — NALOXONE HYDROCHLORIDE 0.4 MG/ML
.1-.4 INJECTION, SOLUTION INTRAMUSCULAR; INTRAVENOUS; SUBCUTANEOUS
Status: DISCONTINUED | OUTPATIENT
Start: 2019-04-15 | End: 2019-04-15 | Stop reason: HOSPADM

## 2019-04-15 RX ORDER — ACETAMINOPHEN 325 MG/1
650 TABLET ORAL
Status: DISCONTINUED | OUTPATIENT
Start: 2019-04-15 | End: 2019-04-15 | Stop reason: HOSPADM

## 2019-04-15 RX ORDER — ONDANSETRON 2 MG/ML
INJECTION INTRAMUSCULAR; INTRAVENOUS PRN
Status: DISCONTINUED | OUTPATIENT
Start: 2019-04-15 | End: 2019-04-15

## 2019-04-15 RX ORDER — LEVOFLOXACIN 5 MG/ML
500 INJECTION, SOLUTION INTRAVENOUS EVERY 24 HOURS
Status: DISCONTINUED | OUTPATIENT
Start: 2019-04-15 | End: 2019-04-15 | Stop reason: HOSPADM

## 2019-04-15 RX ORDER — SODIUM CHLORIDE, SODIUM LACTATE, POTASSIUM CHLORIDE, CALCIUM CHLORIDE 600; 310; 30; 20 MG/100ML; MG/100ML; MG/100ML; MG/100ML
INJECTION, SOLUTION INTRAVENOUS CONTINUOUS
Status: DISCONTINUED | OUTPATIENT
Start: 2019-04-15 | End: 2019-04-15 | Stop reason: HOSPADM

## 2019-04-15 RX ORDER — ONDANSETRON 2 MG/ML
4 INJECTION INTRAMUSCULAR; INTRAVENOUS EVERY 30 MIN PRN
Status: DISCONTINUED | OUTPATIENT
Start: 2019-04-15 | End: 2019-04-15 | Stop reason: HOSPADM

## 2019-04-15 RX ORDER — LABETALOL HYDROCHLORIDE 5 MG/ML
10 INJECTION, SOLUTION INTRAVENOUS EVERY 5 MIN PRN
Status: DISCONTINUED | OUTPATIENT
Start: 2019-04-15 | End: 2019-04-15 | Stop reason: HOSPADM

## 2019-04-15 RX ORDER — HYDROMORPHONE HYDROCHLORIDE 1 MG/ML
.3-.5 INJECTION, SOLUTION INTRAMUSCULAR; INTRAVENOUS; SUBCUTANEOUS EVERY 10 MIN PRN
Status: DISCONTINUED | OUTPATIENT
Start: 2019-04-15 | End: 2019-04-15 | Stop reason: HOSPADM

## 2019-04-15 RX ORDER — ALBUTEROL SULFATE 0.83 MG/ML
2.5 SOLUTION RESPIRATORY (INHALATION) EVERY 4 HOURS PRN
Status: DISCONTINUED | OUTPATIENT
Start: 2019-04-15 | End: 2019-04-15 | Stop reason: HOSPADM

## 2019-04-15 RX ORDER — MEPERIDINE HYDROCHLORIDE 25 MG/ML
12.5 INJECTION INTRAMUSCULAR; INTRAVENOUS; SUBCUTANEOUS
Status: DISCONTINUED | OUTPATIENT
Start: 2019-04-15 | End: 2019-04-15 | Stop reason: HOSPADM

## 2019-04-15 RX ORDER — DEXAMETHASONE SODIUM PHOSPHATE 4 MG/ML
INJECTION, SOLUTION INTRA-ARTICULAR; INTRALESIONAL; INTRAMUSCULAR; INTRAVENOUS; SOFT TISSUE PRN
Status: DISCONTINUED | OUTPATIENT
Start: 2019-04-15 | End: 2019-04-15

## 2019-04-15 RX ORDER — KETOROLAC TROMETHAMINE 30 MG/ML
INJECTION, SOLUTION INTRAMUSCULAR; INTRAVENOUS PRN
Status: DISCONTINUED | OUTPATIENT
Start: 2019-04-15 | End: 2019-04-15

## 2019-04-15 RX ORDER — FENTANYL CITRATE 50 UG/ML
INJECTION, SOLUTION INTRAMUSCULAR; INTRAVENOUS PRN
Status: DISCONTINUED | OUTPATIENT
Start: 2019-04-15 | End: 2019-04-15

## 2019-04-15 RX ORDER — PROPOFOL 10 MG/ML
INJECTION, EMULSION INTRAVENOUS CONTINUOUS PRN
Status: DISCONTINUED | OUTPATIENT
Start: 2019-04-15 | End: 2019-04-15

## 2019-04-15 RX ADMIN — FENTANYL CITRATE 50 MCG: 50 INJECTION, SOLUTION INTRAMUSCULAR; INTRAVENOUS at 13:33

## 2019-04-15 RX ADMIN — SODIUM CHLORIDE, POTASSIUM CHLORIDE, SODIUM LACTATE AND CALCIUM CHLORIDE: 600; 310; 30; 20 INJECTION, SOLUTION INTRAVENOUS at 12:47

## 2019-04-15 RX ADMIN — PROPOFOL 140 MCG/KG/MIN: 10 INJECTION, EMULSION INTRAVENOUS at 13:37

## 2019-04-15 RX ADMIN — PROPOFOL 140 MCG/KG/MIN: 10 INJECTION, EMULSION INTRAVENOUS at 13:30

## 2019-04-15 RX ADMIN — ONDANSETRON 4 MG: 2 INJECTION INTRAMUSCULAR; INTRAVENOUS at 13:39

## 2019-04-15 RX ADMIN — MIDAZOLAM 2 MG: 1 INJECTION INTRAMUSCULAR; INTRAVENOUS at 13:26

## 2019-04-15 RX ADMIN — PROPOFOL 125 MCG/KG/MIN: 10 INJECTION, EMULSION INTRAVENOUS at 13:44

## 2019-04-15 RX ADMIN — LIDOCAINE HYDROCHLORIDE 5 ML: 10 INJECTION, SOLUTION EPIDURAL; INFILTRATION; INTRACAUDAL; PERINEURAL at 09:03

## 2019-04-15 RX ADMIN — SODIUM CHLORIDE, POTASSIUM CHLORIDE, SODIUM LACTATE AND CALCIUM CHLORIDE: 600; 310; 30; 20 INJECTION, SOLUTION INTRAVENOUS at 13:37

## 2019-04-15 RX ADMIN — FENTANYL CITRATE 50 MCG: 50 INJECTION, SOLUTION INTRAMUSCULAR; INTRAVENOUS at 13:46

## 2019-04-15 RX ADMIN — LIDOCAINE HYDROCHLORIDE 30 MG: 10 INJECTION, SOLUTION EPIDURAL; INFILTRATION; INTRACAUDAL; PERINEURAL at 13:29

## 2019-04-15 RX ADMIN — KETOROLAC TROMETHAMINE 30 MG: 30 INJECTION, SOLUTION INTRAMUSCULAR at 14:00

## 2019-04-15 RX ADMIN — HYDROCODONE BITARTRATE AND ACETAMINOPHEN 1 TABLET: 5; 325 TABLET ORAL at 15:11

## 2019-04-15 RX ADMIN — DEXAMETHASONE SODIUM PHOSPHATE 4 MG: 4 INJECTION, SOLUTION INTRA-ARTICULAR; INTRALESIONAL; INTRAMUSCULAR; INTRAVENOUS; SOFT TISSUE at 13:35

## 2019-04-15 ASSESSMENT — MIFFLIN-ST. JEOR: SCORE: 1582.14

## 2019-04-15 NOTE — ANESTHESIA CARE TRANSFER NOTE
Patient: Cassi Ye    Procedure(s):  left breast seed localized biopsy, left areola punch biopsy    Diagnosis: Left intraductal papilloma, areolar rash  Diagnosis Additional Information: No value filed.    Anesthesia Type:   MAC     Note:  Airway :Face Mask  Patient transferred to:Phase II  Comments: Pt transported without issue on O2. Awake and alert. VSS. Report to RN. Handoff Report: Identifed the Patient, Identified the Reponsible Provider, Reviewed the pertinent medical history, Discussed the surgical course, Reviewed Intra-OP anesthesia mangement and issues during anesthesia, Set expectations for post-procedure period and Allowed opportunity for questions and acknowledgement of understanding      Vitals: (Last set prior to Anesthesia Care Transfer)    CRNA VITALS  4/15/2019 1338 - 4/15/2019 1419      4/15/2019             Pulse:  95    SpO2:  97 %                Electronically Signed By: ARTIE Denney CRNA  April 15, 2019  2:19 PM

## 2019-04-15 NOTE — ANESTHESIA POSTPROCEDURE EVALUATION
Patient: Cassi Ye    Procedure(s):  left breast seed localized biopsy, left areola punch biopsy    Diagnosis:Left intraductal papilloma, areolar rash  Diagnosis Additional Information: No value filed.    Anesthesia Type:  MAC    Note:  Anesthesia Post Evaluation    Patient location during evaluation: PACU  Patient participation: Able to fully participate in evaluation  Level of consciousness: awake and alert  Pain management: adequate  Airway patency: patent  Cardiovascular status: acceptable  Respiratory status: acceptable  Hydration status: acceptable  PONV: none     Anesthetic complications: None          Last vitals:  Vitals:    04/15/19 0745 04/15/19 1414 04/15/19 1425   BP: 130/84 103/72 105/75   Pulse: 98 91 89   Resp: 18 14 14   Temp: 97.7  F (36.5  C) 97.3  F (36.3  C)    SpO2: 100% 99% 99%         Electronically Signed By: Mario Gerber MD  April 15, 2019  2:47 PM

## 2019-04-15 NOTE — ANESTHESIA PREPROCEDURE EVALUATION
Anesthesia Pre-Procedure Evaluation    Patient: Cassi Ye   MRN: [id number] : 1975          Preoperative Diagnosis: Left intraductal papilloma, areolar rash    Procedure(s):  left breast seed localized biopsy, left areola punch biopsy    Past Medical History:   Diagnosis Date     Gastroesophageal reflux disease      Pain in joint, pelvic region and thigh      Variants of migraine, not elsewhere classified, without mention of intractable migraine without mention of status migrainosus     Nasal spray     Past Surgical History:   Procedure Laterality Date     C NONSPECIFIC PROCEDURE      Cryotherapy     ESOPHAGOSCOPY, GASTROSCOPY, DUODENOSCOPY (EGD), COMBINED N/A 2017    Procedure: COMBINED ESOPHAGOSCOPY, GASTROSCOPY, DUODENOSCOPY (EGD), BIOPSY SINGLE OR MULTIPLE;  ESOPHAGOSCOPY, GASTROSCOPY, DUODENOSCOPY (EGD) with biopsies by cold forcep.  ;  Surgeon: Mario Dey MD;  Location:  GI     Anesthesia Evaluation     . Pt has had prior anesthetic. Type: General    No history of anesthetic complications          ROS/MED HX    ENT/Pulmonary:  - neg pulmonary ROS     Neurologic:     (+)migraines,     Cardiovascular:  - neg cardiovascular ROS       METS/Exercise Tolerance:     Hematologic:  - neg hematologic  ROS       Musculoskeletal:   (+) arthritis,  -       GI/Hepatic:     (+) GERD       Renal/Genitourinary:  - ROS Renal section negative       Endo:  - neg endo ROS       Psychiatric:  - neg psychiatric ROS       Infectious Disease:  - neg infectious disease ROS       Malignancy:   (+) Malignancy History of Breast          Other:    - neg other ROS                      Physical Exam  Normal systems: cardiovascular, pulmonary and dental    Airway   Mallampati: I  TM distance: >3 FB  Neck ROM: full    Dental     Cardiovascular       Pulmonary             Lab Results   Component Value Date    WBC 6.2 2009    HGB 11.6 (L) 01/15/2010    HCT 41.2 2009     2009      "11/02/2015    POTASSIUM 3.8 11/02/2015    CHLORIDE 106 11/02/2015    CO2 27 11/02/2015    BUN 13 11/02/2015    CR 0.77 11/02/2015    GLC 89 11/02/2015    VANI 9.0 11/02/2015    ALBUMIN 4.3 05/31/2005    PROTTOTAL 7.8 05/31/2005    ALT 30 05/31/2005    AST 31 05/31/2005    ALKPHOS 60 05/31/2005    BILITOTAL 0.7 05/31/2005    TSH 1.14 11/02/2015    HCG Negative 04/15/2019    HCGS Positive (A) 05/10/2009       Preop Vitals  BP Readings from Last 3 Encounters:   04/15/19 130/84   04/11/19 110/70   04/10/19 122/78    Pulse Readings from Last 3 Encounters:   04/15/19 98   04/11/19 100   04/10/19 107      Resp Readings from Last 3 Encounters:   04/15/19 18   04/11/19 16   04/10/19 16    SpO2 Readings from Last 3 Encounters:   04/15/19 100%   04/11/19 100%   04/10/19 100%      Temp Readings from Last 1 Encounters:   04/15/19 97.7  F (36.5  C) (Temporal)    Ht Readings from Last 1 Encounters:   04/15/19 1.753 m (5' 9\")      Wt Readings from Last 1 Encounters:   04/15/19 85.3 kg (188 lb)    Estimated body mass index is 27.76 kg/m  as calculated from the following:    Height as of this encounter: 1.753 m (5' 9\").    Weight as of this encounter: 85.3 kg (188 lb).       Anesthesia Plan      History & Physical Review  History and physical reviewed and following examination; no interval change.    ASA Status:  1 .    NPO Status:  > 8 hours    Plan for MAC   PONV prophylaxis:  Ondansetron (or other 5HT-3)       Postoperative Care  Postoperative pain management:  IV analgesics and Oral pain medications.      Consents  Anesthetic plan, risks, benefits and alternatives discussed with:  Patient..                 Mario Gerber MD                    .  "

## 2019-04-15 NOTE — OP NOTE
PREOPERATIVE DIAGNOSIS: Left breast mass and intraductal papilloma, left areolar rash.   POSTOPERATIVE DIAGNOSIS: Left breast mass and intraductal papilloma, left areolar rash.   PROCEDURE: Left excisional breast biopsy with seed localization, punch biopsy left areola.   ANESTHESIA: Local with MAC.   PREOPERATIVE MEDICATIONS: Levaquin  500 mg IV.   SURGEON: Paula Rodriguez MD   ASSISTANT: Olga Lidia Trevino PA-C  - the physician assistant was medically necessary in providing adequate exposure in the operating field, maintaining hemostasis, cutting suture, clamping and ligating blood vessels, and visualization of the anatomic structures throughout the surgical procedure.   INDICATIONS: Cassi Ye is a 43 year old female was found to have a left breast rash and a hypoechoic mass on her workup for this.  The mass is fibrocystic and a small intraductal papilloma. The localization seed is adjacent to the lesion.  She presents now for excisional biopsy and a punch biopsy of the rash.   PROCEDURE: The patient was placed supine. Left breast prepped and draped in usual sterile fashion. Local anesthetic was obtained with 1% lidocaine and 0.25% Marcaine plain. Curvilinear incision was made directly over the area of increased counts.  The dissection was deepened, using the radioactive seed to determine the direction of the dissection.  Once we are around and deep to the area of increased counts, the specimen is removed from the breast.  The gamma probe confirms that the seed is now in the specimen and not the breast.  Grossly, the specimen is consistent with fibrocystic tissue. Palpation through the incision site found the remaining tissue to be  palpably normal. The site was then inspected for hemostasis, irrigated and closed using 4-0 subcuticular Monocryl. Local was then infiltrated in the medial arolar and three 3 mm punch biopsies of the skin were obtained.  No closure was needed.  Sterile dressings were applied and  the patient transferred to recovery in good condition.   ESTIMATED BLOOD LOSS: Less than 10 cc.   INTRAOPERATIVE FINDINGS: Specimen excised by specimen mammogram, pathology pending.   DION SWAIN MD

## 2019-04-15 NOTE — DISCHARGE INSTRUCTIONS
SEDATION ADULT DISCHARGE INSTRUCTIONS   SPECIAL PRECAUTIONS FOR 24 HOURS AFTER SURGERY    IT IS NOT UNUSUAL TO FEEL LIGHT-HEADED OR FAINT, UP TO 24 HOURS AFTER SURGERY OR WHILE TAKING PAIN MEDICATION.  IF YOU HAVE THESE SYMPTOMS; SIT FOR A FEW MINUTES BEFORE STANDING AND HAVE SOMEONE ASSIST YOU WHEN YOU GET UP TO WALK OR USE THE BATHROOM.    YOU SHOULD REST AND RELAX FOR THE NEXT 24 HOURS AND YOU MUST MAKE ARRANGEMENTS TO HAVE SOMEONE STAY WITH YOU FOR AT LEAST 24 HOURS AFTER YOUR DISCHARGE.  AVOID HAZARDOUS AND STRENUOUS ACTIVITIES.  DO NOT MAKE IMPORTANT DECISIONS FOR 24 HOURS.    DO NOT DRIVE ANY VEHICLE OR OPERATE MECHANICAL EQUIPMENT FOR 24 HOURS FOLLOWING THE END OF YOUR SURGERY.  EVEN THOUGH YOU MAY FEEL NORMAL, YOUR REACTIONS MAY BE AFFECTED BY THE MEDICATION YOU HAVE RECEIVED.    DO NOT DRINK ALCOHOLIC BEVERAGES FOR 24 HOURS FOLLOWING YOUR SURGERY.    DRINK CLEAR LIQUIDS (APPLE JUICE, GINGER ALE, 7-UP, BROTH, ETC.).  PROGRESS TO YOUR REGULAR DIET AS YOU FEEL ABLE.    YOU MAY HAVE A DRY MOUTH, A SORE THROAT, MUSCLES ACHES OR TROUBLE SLEEPING.  THESE SHOULD GO AWAY AFTER 24 HOURS.    CALL YOUR DOCTOR FOR ANY OF THE FOLLOWING:  SIGNS OF INFECTION (FEVER, GROWING TENDERNESS AT THE SURGERY SITE, A LARGE AMOUNT OF DRAINAGE OR BLEEDING, SEVERE PAIN, FOUL-SMELLING DRAINAGE, REDNESS OR SWELLING.    IT HAS BEEN OVER 8 TO 10 HOURS SINCE SURGERY AND YOU ARE STILL NOT ABLE TO URINATE (PASS WATER).       HOME CARE FOLLOWING BREAST BIOPSY  SITA Bryan, ELYSE Levin. CLARY Worthington &  LAURA Joya    RESULTS:  You may call for your final pathology report after 1p.m. two working days after surgery.    INCISIONAL CARE:    If you have a dressing in place, keep clean and dry for 48 hours; you may replace the gauze if it becomes soiled.    After 48 hours you may remove the dressing and shower.  Do not submerse incision in water for 1 week.    If you have a Dermabond dressing (a type of skin glue), you may  shower immediately.    Sutures will absorb and do not need to be removed.    If present, leave the steri-strips (white paper tapes) in place for 14 days after surgery.    If present, leave Dermabond glue in place until it wears/flakes off.    You may expect a small amount of drainage from your incision.    A lump/ridge under the incision is normal and will gradually resolve.    SUPPORT:  Wear a bra for support and comfort for 3-7 days, day and night.    ACTIVITY:  Cautiously resume exercise and strenuous activities such as jogging, tennis, aerobics, etc.  Also, be careful of stretching activities with operative side for two weeks.    DIET:  No restrictions.  Increased fluid intake is recommended. While taking pain medications, increase dietary fiber or add a fiber supplementation like Metamucil or Citrucel to help prevent constipation - a possible side effect of pain medications.      DISCOMFORT:  Local anesthetic placed at surgery should provide relief for 4-8 hours.  Begin taking pain pills before discomfort is severe.  Take the pain medication with some food, when possible, to minimize side effects.  Intermittent use of ice packs may help during the first 48 hours.  Expect gradual improvement.    RETURN APPOINTMENT:  Schedule a follow-up visit 2-3 weeks post-op.  Office Phone: [phone]     CONTACT US IF THE FOLLOWING DEVELOPS:   1.  A fever that is above 101    2.  If there is a large amount of drainage, bleeding, or swelling   3.  Severe pain that is not relieved by your prescription.   4.  Drainage that is thick, cloudy, yellow, green or white.   5.  Any other questions not answered by  Frequently Asked Questions  sheet.      You received Toradol, an IV form of ibuprofen (Motrin) at 2:00pm.  Do not take any ibuprofen products until 8:00pm.

## 2019-04-15 NOTE — PROGRESS NOTES
SBAR Seed Localization    SITUATION:  Patient to breast imaging center for imaging guided seed localizations before breast lumpectomy or excision biopsy without sentinel node injection.    BACKGROUND:  Breast imaging cancer, breast abnormality  Ordered procedure completed: Yes  Special needs identified: No     ASSESSMENT:  SBAR report called to patient care unit because of unexpected event in radiology: No  Allergies and medication list reviewed prior to procedure. Yes  Skin cleansed with ChloraPrep One-Step.  Anesthesia: approximately 5ml of 1% Lidocaine injection subcutaneous before seed insertion administered by the radiologist.   Gauze dressing over insertion site(s).  Post procedure mammogram completed: Yes    Patient tolerance: patient tolerated left breast ultrasound guided radioactive seed localization well.    RECOMMENDATIONS:  Patient transferred to Same Day Surgery in stable condition via wheelchair with Transport Services.  Copy of note given to patient and instructions to hand this note to surgery staff.    Please call [facility] [phone] if there are any questions.

## 2019-04-15 NOTE — TELEPHONE ENCOUNTER
Spoke with patient after seed localization procedure.  Patient voices feeling good, denies pain or feeling faint.  Education provided regarding home care after procedure.  Reviewed after excisional biopsy teaching sheet.  Patient transported via Nursing support to pre op. Denies concerns or further questions.

## 2019-04-17 LAB — COPATH REPORT: NORMAL

## 2019-04-18 NOTE — OR NURSING
Dr. Rodriguez and Dr. Serrato confirmed placement and collection of 1 breast seed via telephone conversation at [phone]

## 2019-04-23 ENCOUNTER — MYC MEDICAL ADVICE (OUTPATIENT)
Dept: FAMILY MEDICINE | Facility: CLINIC | Age: 44
End: 2019-04-23

## 2019-04-23 DIAGNOSIS — L30.9 DERMATITIS: Primary | ICD-10-CM

## 2019-04-23 NOTE — TELEPHONE ENCOUNTER
Will route to Dr. Penn to review.     Katherine Preston, RN -- [facility]

## 2019-05-01 ENCOUNTER — OFFICE VISIT (OUTPATIENT)
Dept: SURGERY | Facility: CLINIC | Age: 44
End: 2019-05-01
Payer: COMMERCIAL

## 2019-05-01 VITALS
RESPIRATION RATE: 16 BRPM | SYSTOLIC BLOOD PRESSURE: 106 MMHG | DIASTOLIC BLOOD PRESSURE: 68 MMHG | OXYGEN SATURATION: 99 % | HEIGHT: 69 IN | BODY MASS INDEX: 27.85 KG/M2 | HEART RATE: 91 BPM | WEIGHT: 188 LBS

## 2019-05-01 DIAGNOSIS — Z09 SURGICAL FOLLOWUP VISIT: Primary | ICD-10-CM

## 2019-05-01 PROCEDURE — 99024 POSTOP FOLLOW-UP VISIT: CPT | Performed by: SURGERY

## 2019-05-01 ASSESSMENT — MIFFLIN-ST. JEOR: SCORE: 1582.14

## 2019-05-01 NOTE — LETTER
May 1, 2019    RE: Cassi Ye, : 1975    Cassi is here for follow up of recent left breast surgery 2+ weeks ago.  She is without complaints.     Smoking History:  quit smoking some time ago.       Left breast -  Incision is healing nicely.                                Seroma is minimal.     Pathology -   FINAL DIAGNOSIS:   A: Breast, left, seed localized excisional biopsy/lumpectomy.   - Proliferative fibrocystic changes with areas of columnar cell   change/columnar cell hyperplasia, ductal   hyperplasia, sclerosing adenosis, duct ectasia, and papillary apocrine   metaplasia.   - Radial scar formation containing minute microscopic focus of intraductal    papilloma.   - Pseudoangiomatous stromal hyperplasia.   - Few microcalcifications present.   - Prior biopsy site changes present.   - Negative for atypical hyperplasia and malignancy.     B: Left areola punch biopsies.   - Subacute to chronic superficial dermatitis with eosinophils.  Negative   for Paget's disease and malignancy.      Assessment and Plan-  Cassi is doing well and has no increased risk for breast cancer based on the recent pathology.  Her family history is still concerning and she should remain diligent.  She should resume monthly breast self exam and obtain a clinical breast exam yearly by her physician.    She also should have her next mammogram in 2019.  I would be happy to see her if she has further issues.     Paula Rodriguez MD

## 2019-05-01 NOTE — PROGRESS NOTES
Cassi is here for follow up of recent left breast surgery 2+ weeks ago.  She is without complaints.    Smoking History:  quit smoking some time ago.     Left breast -  Incision is healing nicely.                                Seroma is minimal.    Pathology -   FINAL DIAGNOSIS:   A: Breast, left, seed localized excisional biopsy/lumpectomy.   - Proliferative fibrocystic changes with areas of columnar cell   change/columnar cell hyperplasia, ductal   hyperplasia, sclerosing adenosis, duct ectasia, and papillary apocrine   metaplasia.   - Radial scar formation containing minute microscopic focus of intraductal    papilloma.   - Pseudoangiomatous stromal hyperplasia.   - Few microcalcifications present.   - Prior biopsy site changes present.   - Negative for atypical hyperplasia and malignancy.     B: Left areola punch biopsies.   - Subacute to chronic superficial dermatitis with eosinophils.  Negative   for Paget's disease and malignancy.     Assessment and Plan-  Cassi is doing well and has no increased risk for breast cancer based on the recent pathology.  Her family history is still concerning and she should remain diligent.  She should resume monthly breast self exam and obtain a clinical breast exam yearly by her physician.    She also should have her next mammogram in November 2019.  I would be happy to see her if she has further issues.    Paula Rodriguez MD    Please route or send letter to:  Primary Care Provider (PCP)

## 2019-06-04 ENCOUNTER — OFFICE VISIT (OUTPATIENT)
Dept: ORTHOPEDICS | Facility: CLINIC | Age: 44
End: 2019-06-04
Payer: COMMERCIAL

## 2019-06-04 VITALS
SYSTOLIC BLOOD PRESSURE: 138 MMHG | BODY MASS INDEX: 27.85 KG/M2 | DIASTOLIC BLOOD PRESSURE: 88 MMHG | HEIGHT: 69 IN | WEIGHT: 188 LBS

## 2019-06-04 DIAGNOSIS — M75.41 IMPINGEMENT SYNDROME OF RIGHT SHOULDER: ICD-10-CM

## 2019-06-04 DIAGNOSIS — M25.511 CHRONIC RIGHT SHOULDER PAIN: Primary | ICD-10-CM

## 2019-06-04 DIAGNOSIS — G89.29 CHRONIC RIGHT SHOULDER PAIN: Primary | ICD-10-CM

## 2019-06-04 PROCEDURE — 99214 OFFICE O/P EST MOD 30 MIN: CPT | Performed by: FAMILY MEDICINE

## 2019-06-04 ASSESSMENT — MIFFLIN-ST. JEOR: SCORE: 1582.14

## 2019-06-04 NOTE — LETTER
"    6/4/2019         RE: Cassi Ye  [address] [place] [address]        Dear Colleague,    Thank you for referring your patient, Cassi Ye, to the [facility]. Please see a copy of my visit note below.    ASSESSMENT & PLAN    1. Chronic right shoulder pain    2. Impingement syndrome of right shoulder      Returns for continued right shoulder pain inspite of structured physical therapy. Only reports ~ 20% overall improvement  Still has weakness on exam after structured therapy  MRI of your right shoulder has been ordered. Schedule with [facility] ([phone]).     Follow-up over [facility] with results / next steps.    -----    SUBJECTIVE:  Cassi Ye is a 43 year old female who is seen in follow-up for right shoulder pain.They were last seen 1/21/2019.  Patient reports 1 month ago she was pulling her dog's bed at the foot of her bed, she felt a pop that did not increase her pain, but scared her.     Since their last visit reports 20% improvement. They indicate that their current pain level is 6/10 anteriorly. They have tried ice, biofreeze,  ibuprofen and Aleve.  Physical Therapy x 6 sessions, last session 3/25/19, continues to work with her HEP.      The patient is seen by themselves.    Patient's past medical, surgical, social, and family histories were reviewed today and changes as noted. Lumpectomy of left breast.    REVIEW OF SYSTEMS:  Constitutional: NEGATIVE for fever, chills, change in weight  Skin: NEGATIVE for worrisome rashes, moles or lesions  GI/: NEGATIVE for bowel or bladder changes  Neuro: NEGATIVE for weakness, dizziness or paresthesias    OBJECTIVE:  /88 (BP Location: Right arm, Patient Position: Chair, Cuff Size: Adult Regular)   Ht 1.753 m (5' 9\")   Wt 85.3 kg (188 lb)   BMI 27.76 kg/m      General: healthy, alert and in no distress  HEENT: no scleral icterus or conjunctival erythema  Skin: no suspicious lesions or rash. No jaundice.  CV: " regular rhythm by palpation, no pedal edema  Resp: normal respiratory effort without conversational dyspnea   Psych: normal mood and affect  Gait: normal steady gait with appropriate coordination and balance  Neuro: normal light touch sensory exam of the extremities.    MSK:  RIGHT SHOULDER  Inspection:    no atrophy  Active Range of Motion:     Abduction 1650, FF 1650, , IR normal.    Strength:    Scapular plane abduction 5-/5, painful  Special Tests:    Positive: Rogers', supraspinatus (empty can)     Patient's conditions were thoroughly discussed during today's visit with greater than 50% of the visit spent counseling the patient with total time spent face-to-face with the patient being 25 minutes.    Tiffanie Blank DO [facility]          Again, thank you for allowing me to participate in the care of your patient.        Sincerely,        Tiffanie Blank DO

## 2019-06-04 NOTE — PROGRESS NOTES
"ASSESSMENT & PLAN    1. Chronic right shoulder pain    2. Impingement syndrome of right shoulder      Returns for continued right shoulder pain inspite of structured physical therapy. Only reports ~ 20% overall improvement  Still has weakness on exam after structured therapy  MRI of your right shoulder has been ordered. Schedule with [facility] ([phone]).     Follow-up over [facility] with results / next steps.    -----    SUBJECTIVE:  Cassi Ye is a 43 year old female who is seen in follow-up for right shoulder pain.They were last seen 1/21/2019.  Patient reports 1 month ago she was pulling her dog's bed at the foot of her bed, she felt a pop that did not increase her pain, but scared her.     Since their last visit reports 20% improvement. They indicate that their current pain level is 6/10 anteriorly. They have tried ice, biofreeze,  ibuprofen and Aleve.  Physical Therapy x 6 sessions, last session 3/25/19, continues to work with her HEP.      The patient is seen by themselves.    Patient's past medical, surgical, social, and family histories were reviewed today and changes as noted. Lumpectomy of left breast.    REVIEW OF SYSTEMS:  Constitutional: NEGATIVE for fever, chills, change in weight  Skin: NEGATIVE for worrisome rashes, moles or lesions  GI/: NEGATIVE for bowel or bladder changes  Neuro: NEGATIVE for weakness, dizziness or paresthesias    OBJECTIVE:  /88 (BP Location: Right arm, Patient Position: Chair, Cuff Size: Adult Regular)   Ht 1.753 m (5' 9\")   Wt 85.3 kg (188 lb)   BMI 27.76 kg/m     General: healthy, alert and in no distress  HEENT: no scleral icterus or conjunctival erythema  Skin: no suspicious lesions or rash. No jaundice.  CV: regular rhythm by palpation, no pedal edema  Resp: normal respiratory effort without conversational dyspnea   Psych: normal mood and affect  Gait: normal steady gait with appropriate coordination and balance  Neuro: normal light touch sensory exam of " the extremities.    MSK:  RIGHT SHOULDER  Inspection:    no atrophy  Active Range of Motion:     Abduction 1650, FF 1650, , IR normal.    Strength:    Scapular plane abduction 5-/5, painful  Special Tests:    Positive: Rogers', supraspinatus (empty can)     Patient's conditions were thoroughly discussed during today's visit with greater than 50% of the visit spent counseling the patient with total time spent face-to-face with the patient being 25 minutes.    Tiffanie Blank DO [facility]

## 2019-06-05 NOTE — PATIENT INSTRUCTIONS
1. Chronic right shoulder pain    2. Impingement syndrome of right shoulder      Given continued pain / slight weakness after structured therapy recommend advanced imaging  MRI of your right shoulder has been ordered. Schedule with [facility] ([phone]).     Follow-up over MyChart with results / next steps.

## 2019-06-10 ENCOUNTER — HOSPITAL ENCOUNTER (OUTPATIENT)
Dept: MRI IMAGING | Facility: CLINIC | Age: 44
Discharge: HOME | End: 2019-06-10
Attending: FAMILY MEDICINE | Admitting: FAMILY MEDICINE
Payer: COMMERCIAL

## 2019-06-10 DIAGNOSIS — G89.29 CHRONIC RIGHT SHOULDER PAIN: ICD-10-CM

## 2019-06-10 DIAGNOSIS — M25.511 CHRONIC RIGHT SHOULDER PAIN: ICD-10-CM

## 2019-06-10 DIAGNOSIS — M75.41 IMPINGEMENT SYNDROME OF RIGHT SHOULDER: ICD-10-CM

## 2019-06-10 PROCEDURE — 73221 MRI JOINT UPR EXTREM W/O DYE: CPT | Mod: RT

## 2019-06-11 ENCOUNTER — TELEPHONE (OUTPATIENT)
Dept: ORTHOPEDICS | Facility: CLINIC | Age: 44
End: 2019-06-11

## 2019-06-11 NOTE — TELEPHONE ENCOUNTER
Notified of MRI results through my chart which shows no rotator cuff tearing.  Indicated that her options are returning for a subacromial injection versus consideration for acupuncture or trigger point injections.  She could follow-up and see Drs. Yeo or Morrison or Martin Wright for a subacromial injection.  Alternatively she can schedule with me in early July when I am back in the office.    Tiffanie Blank DO, [facility]

## 2019-06-21 ENCOUNTER — OFFICE VISIT (OUTPATIENT)
Dept: ORTHOPEDICS | Facility: CLINIC | Age: 44
End: 2019-06-21
Payer: COMMERCIAL

## 2019-06-21 DIAGNOSIS — G89.29 CHRONIC RIGHT SHOULDER PAIN: Primary | ICD-10-CM

## 2019-06-21 DIAGNOSIS — M75.41 IMPINGEMENT SYNDROME OF RIGHT SHOULDER: ICD-10-CM

## 2019-06-21 DIAGNOSIS — M25.511 CHRONIC RIGHT SHOULDER PAIN: Primary | ICD-10-CM

## 2019-06-21 PROCEDURE — 20610 DRAIN/INJ JOINT/BURSA W/O US: CPT | Mod: RT | Performed by: FAMILY MEDICINE

## 2019-06-21 RX ORDER — METHYLPREDNISOLONE ACETATE 40 MG/ML
40 INJECTION, SUSPENSION INTRA-ARTICULAR; INTRALESIONAL; INTRAMUSCULAR; SOFT TISSUE
Status: SHIPPED | OUTPATIENT
Start: 2019-06-21

## 2019-06-21 RX ORDER — LIDOCAINE HYDROCHLORIDE 10 MG/ML
1 INJECTION, SOLUTION INFILTRATION; PERINEURAL
Status: SHIPPED | OUTPATIENT
Start: 2019-06-21

## 2019-06-21 RX ADMIN — METHYLPREDNISOLONE ACETATE 40 MG: 40 INJECTION, SUSPENSION INTRA-ARTICULAR; INTRALESIONAL; INTRAMUSCULAR; SOFT TISSUE at 09:48

## 2019-06-21 RX ADMIN — LIDOCAINE HYDROCHLORIDE 1 ML: 10 INJECTION, SOLUTION INFILTRATION; PERINEURAL at 09:48

## 2019-06-21 NOTE — PATIENT INSTRUCTIONS
1. Chronic right shoulder pain    2. Impingement syndrome of right shoulder      Steroid injection of the right shoulder: subacromial space was performed today in clinic    - Would not soak in a hot tub, bath or swimming pool for 48 hours  - Ok to shower  - Ice today and only do your normal amounts of activity  - The lidocaine (what is giving you pain relief right now) will likely stop working in 1-2 hours.  You will then have pain again, similar to before you received the injection. The corticosteroid will not start working until approximately 1-2 weeks from now.  In a small percentage of people, cortisone can cause flushing/redness in the face. This usually lasts for 1-3 days and resolves. Cool compress and Ibuprofen/Tylenol can help if this happens.    Follow up with Dr. Blank for repeat injection when pain returns.  OK to repeat the injection any time after 4 months.

## 2019-06-21 NOTE — LETTER
6/21/2019         RE: Cassi Ye  [address] [place] [address]        Dear Colleague,    Thank you for referring your patient, Cassi Ye, to the [facility]. Please see a copy of my visit note below.    ASSESSMENT & PLAN  Patient Instructions     1. Chronic right shoulder pain    2. Impingement syndrome of right shoulder      Steroid injection of the right shoulder: subacromial space was performed today in clinic    - Would not soak in a hot tub, bath or swimming pool for 48 hours  - Ok to shower  - Ice today and only do your normal amounts of activity  - The lidocaine (what is giving you pain relief right now) will likely stop working in 1-2 hours.  You will then have pain again, similar to before you received the injection. The corticosteroid will not start working until approximately 1-2 weeks from now.  In a small percentage of people, cortisone can cause flushing/redness in the face. This usually lasts for 1-3 days and resolves. Cool compress and Ibuprofen/Tylenol can help if this happens.    Follow up with Dr. Blank for repeat injection when pain returns.  OK to repeat the injection any time after 4 months.          -----    SUBJECTIVE:  Cassi Ye is a 43 year old female who is seen for right subacromial shoulder injection  at the request of Dr. Tiffanie Blank.      Large Joint Injection/Arthocentesis: R glenohumeral joint  Date/Time: 6/21/2019 9:48 AM  Performed by: Yeo, Albert, MD  Authorized by: Yeo, Albert, MD     Indications:  Pain and osteoarthritis  Needle Size:  22 G  Guidance: landmark guided    Approach:  Posterior  Location:  Shoulder      Site:  R glenohumeral joint  Medications:  1 mL lidocaine 1 %; 40 mg methylPREDNISolone 40 MG/ML  Outcome:  Tolerated well, no immediate complications  Procedure discussed: discussed risks, benefits, and alternatives    Consent Given by:  Patient  Timeout: timeout called immediately prior to procedure    Prep:  patient was prepped and draped in usual sterile fashion            Patient rates pain as 4/10 pre-procedure. Patient rates pain as 2/10 post-procedure.      Albert Yeo MD, [facility]      Again, thank you for allowing me to participate in the care of your patient.        Sincerely,        Albert Yeo, MD

## 2019-06-21 NOTE — PROGRESS NOTES
ASSESSMENT & PLAN  Patient Instructions     1. Chronic right shoulder pain    2. Impingement syndrome of right shoulder      Steroid injection of the right shoulder: subacromial space was performed today in clinic    - Would not soak in a hot tub, bath or swimming pool for 48 hours  - Ok to shower  - Ice today and only do your normal amounts of activity  - The lidocaine (what is giving you pain relief right now) will likely stop working in 1-2 hours.  You will then have pain again, similar to before you received the injection. The corticosteroid will not start working until approximately 1-2 weeks from now.  In a small percentage of people, cortisone can cause flushing/redness in the face. This usually lasts for 1-3 days and resolves. Cool compress and Ibuprofen/Tylenol can help if this happens.    Follow up with Dr. Blank for repeat injection when pain returns.  OK to repeat the injection any time after 4 months.          -----    SUBJECTIVE:  Cassi Ye is a 43 year old female who is seen for right subacromial shoulder injection  at the request of Dr. Tiffanie Blank.      Large Joint Injection/Arthocentesis: R subacromial bursa  Date/Time: 6/21/2019 9:48 AM  Performed by: Yeo, Albert, MD  Authorized by: Yeo, Albert, MD     Indications:  Pain  Needle Size:  22 G  Guidance: landmark guided    Approach:  Posterior  Location:  Shoulder      Site:  R subacromial bursa  Medications:  1 mL lidocaine 1 %; 40 mg methylPREDNISolone 40 MG/ML  Outcome:  Tolerated well, no immediate complications  Procedure discussed: discussed risks, benefits, and alternatives    Consent Given by:  Patient  Timeout: timeout called immediately prior to procedure    Prep: patient was prepped and draped in usual sterile fashion            Patient rates pain as 4/10 pre-procedure. Patient rates pain as 2/10 post-procedure.      Albert Yeo MD, [facility]

## 2019-07-02 NOTE — MR AVS SNAPSHOT
After Visit Summary   4/5/2017    Cassi Ye    MRN: [id number]           Patient Information     Date Of Birth          1975        Visit Information        Provider Department      4/5/2017 2:45 PM Kim Penn MD [facility]        Today's Diagnoses     Gastroesophageal reflux disease, esophagitis presence not specified    -  1      Care Instructions    Zantac/Ranitidine:  I would recommend trying Ranitidine 150 MG once daily in addition to Omeprazole.        Follow-ups after your visit        Additional Services     GASTROENTEROLOGY ADULT REF PROCEDURE ONLY       Last Lab Result: Creatinine (mg/dL)       Date                     Value                 11/02/2015               0.77             ----------  Body mass index is 28.12 kg/(m^2).      Patient will be contacted to schedule procedure.     Please be aware that coverage of these services is subject to the terms and limitations of your health insurance plan.  Call member services at your health plan with any benefit or coverage questions.  Any procedures must be performed at a [facility] facility OR coordinated by your clinic's referral office.    Please bring the following with you to your appointment:    (1) Any X-Rays, CTs or MRIs which have been performed.  Contact the facility where they were done to arrange for  prior to your scheduled appointment.    (2) List of current medications   (3) This referral request   (4) Any documents/labs given to you for this referral                  Who to contact     If you have questions or need follow up information about today's clinic visit or your schedule please contact [facility] directly at [phone].  Normal or non-critical lab and imaging results will be communicated to you by MyChart, letter or phone within 4 business days after the clinic has received the results. If you do not hear from us within 7 days, please contact the clinic  Return visit for 58 year old woman with sensory symptoms. New diagnosis of fibromyalgia. Serial EDx suggests progressive sensory ganglionopathy.         Mental state: Alert, appropriate, speech, language, and thought content normal.       Sensory examination:   Right Left   Light touch Normal Normal   Vibration (timed)     Vibration (Rydell-Seiffer) 7  MM4   MM4   Pin MF Normal   Position     Legend:   MM = medial malleolus, TT = tibial tuberosity, K = patella, MCP = MCP joint  MF = mid-foot, DC = distal calf, MC = mid calf, PC = proximal calf      Manual muscle testing (A indicates atrophy):   Right Left   Hip flexion 5 5   Knee flexion 5 5   Knee extension 5 5   Dorsiflexion 5 5   Plantar flexion 5 5     Muscle tone:   Right Left   Upper limb Normal Normal   Lower limb Normal Normal        Reflexes:   Right Left   Patellar 2 2   Achilles 2 2   Plantar Flexor Flexor   Clonus Absent Absent        Gait:  Normal.    Impression:  Possible ganglionopathy, >> 2 years duration.    Recommendations:  Despite negative sicca, obtain Sjogren's Abs.  MMA, copper.  Symptom management.  Periodic followup examinations.      "through CDELt or phone. If you have a critical or abnormal lab result, we will notify you by phone as soon as possible.  Submit refill requests through quitchen or call your pharmacy and they will forward the refill request to us. Please allow 3 business days for your refill to be completed.          Additional Information About Your Visit        [facility].ithart Information     quitchen gives you secure access to your electronic health record. If you see a primary care provider, you can also send messages to your care team and make appointments. If you have questions, please call your primary care clinic.  If you do not have a primary care provider, please call [phone] and they will assist you.        Care EveryWhere ID     This is your Care EveryWhere ID. This could be used by other organizations to access your [facility] medical records  [id number]        Your Vitals Were     Pulse Temperature Respirations Height Last Period Pulse Oximetry    81 97.8  F (36.6  C) (Oral) 14 5' 9.5\" (1.765 m) 01/26/2017 100%    BMI (Body Mass Index)                   28.12 kg/m2            Blood Pressure from Last 3 Encounters:   04/05/17 98/62   11/07/16 114/78   03/24/16 112/60    Weight from Last 3 Encounters:   04/05/17 193 lb 3.2 oz (87.6 kg)   11/07/16 190 lb (86.2 kg)   03/24/16 177 lb 9.6 oz (80.6 kg)              We Performed the Following     GASTROENTEROLOGY ADULT REF PROCEDURE ONLY        Primary Care Provider Office Phone # Fax #    Kim Penn [phone]       [facility] [address] [facility] [address]        Thank you!     Thank you for choosing [facility]  for your care. Our goal is always to provide you with excellent care. Hearing back from our patients is one way we can continue to improve our services. Please take a few minutes to complete the written survey that you may receive in the mail after your visit with us. Thank you!             Your Updated " Medication List - Protect others around you: Learn how to safely use, store and throw away your medicines at www.disposemymeds.org.          This list is accurate as of: 4/5/17  3:29 PM.  Always use your most recent med list.                   Brand Name Dispense Instructions for use    levonorgestrel-ethinyl estradiol 0.15-0.03 MG per tablet    SEASONALE    90 tablet    Take 1 tablet by mouth daily       magnesium 250 MG tablet      Take 1 tablet by mouth 2 times daily.       MAXALT-MLT 10 MG ODT tab   Generic drug:  rizatriptan      Take 10 mg by mouth at onset of headache.       multivitamin per tablet      1 TABLET DAILY       nortriptyline 25 MG capsule    PAMELOR     Take 50 mg by mouth At Bedtime.       priLOSEC OTC 20 MG tablet   Generic drug:  omeprazole      Take 1 tablet by mouth daily.       VITAMIN B-2 PO      Take 200 mg by mouth 2 times daily.       VITAMIN D (CHOLECALCIFEROL) PO      Take 1,000 Units by mouth daily

## 2019-07-23 ENCOUNTER — TRANSFERRED RECORDS (OUTPATIENT)
Dept: HEALTH INFORMATION MANAGEMENT | Facility: CLINIC | Age: 44
End: 2019-07-23

## 2019-08-16 ENCOUNTER — MYC MEDICAL ADVICE (OUTPATIENT)
Dept: ORTHOPEDICS | Facility: CLINIC | Age: 44
End: 2019-08-16

## 2019-08-16 DIAGNOSIS — G89.29 CHRONIC RIGHT SHOULDER PAIN: Primary | ICD-10-CM

## 2019-08-16 DIAGNOSIS — M25.511 CHRONIC RIGHT SHOULDER PAIN: Primary | ICD-10-CM

## 2019-09-30 ENCOUNTER — HEALTH MAINTENANCE LETTER (OUTPATIENT)
Age: 44
End: 2019-09-30

## 2019-11-10 DIAGNOSIS — N92.1 BREAKTHROUGH BLEEDING ON OCPS: ICD-10-CM

## 2019-11-10 DIAGNOSIS — Z30.09 GENERAL COUNSELING FOR PRESCRIPTION OF ORAL CONTRACEPTIVES: ICD-10-CM

## 2019-11-11 NOTE — TELEPHONE ENCOUNTER
"Requested Prescriptions   Pending Prescriptions Disp Refills     INTROVALE 0.15-0.03 MG tablet [Pharmacy Med Name: L-VERÓNICA/ET ES(INTROVALE)TB91 0.15/30] 91 tablet 4     Sig: TAKE 1 TABLET DAILY   Last Written Prescription Date:  11/14/18  Last Fill Quantity: 90,  # refills: 3   Last office visit: 4/11/2019 with prescribing provider:  Kim Penn MD    Future Office Visit:   Next 5 appointments (look out 90 days)    Nov 20, 2019  4:15 PM CST  MyChart Physical Adult with [facility] MD  [facility] [address] [place] [id number] [phone]             Contraceptives Protocol Passed - 11/10/2019  6:33 AM        Passed - Patient is not a current smoker if age is 35 or older        Passed - Recent (12 mo) or future (30 days) visit within the authorizing provider's specialty     Patient has had an office visit with the authorizing provider or a provider within the authorizing providers department within the previous 12 mos or has a future within next 30 days. See \"Patient Info\" tab in inbasket, or \"Choose Columns\" in Meds & Orders section of the refill encounter.              Passed - Medication is active on med list        Passed - No active pregnancy on record        Passed - No positive pregnancy test in past 12 months         "

## 2019-11-12 RX ORDER — LEVONORGESTREL AND ETHINYL ESTRADIOL 0.15-0.03
KIT ORAL
Qty: 91 TABLET | Refills: 0 | Status: SHIPPED | OUTPATIENT
Start: 2019-11-12 | End: 2019-11-20

## 2019-11-17 ASSESSMENT — ENCOUNTER SYMPTOMS
MYALGIAS: 0
DIZZINESS: 0
DYSURIA: 0
PARESTHESIAS: 0
DIARRHEA: 0
ARTHRALGIAS: 1
NERVOUS/ANXIOUS: 0
ABDOMINAL PAIN: 0
PALPITATIONS: 0
HEADACHES: 0
NAUSEA: 0
HEMATOCHEZIA: 0
CHILLS: 0
BREAST MASS: 0
JOINT SWELLING: 0
SHORTNESS OF BREATH: 0
FREQUENCY: 1
COUGH: 0
WEAKNESS: 0
CONSTIPATION: 0
EYE PAIN: 0
SORE THROAT: 0
HEARTBURN: 1
FEVER: 0
HEMATURIA: 0

## 2019-11-20 ENCOUNTER — OFFICE VISIT (OUTPATIENT)
Dept: FAMILY MEDICINE | Facility: CLINIC | Age: 44
End: 2019-11-20
Payer: COMMERCIAL

## 2019-11-20 VITALS
WEIGHT: 193.3 LBS | BODY MASS INDEX: 28.63 KG/M2 | RESPIRATION RATE: 15 BRPM | HEART RATE: 94 BPM | TEMPERATURE: 98 F | OXYGEN SATURATION: 100 % | DIASTOLIC BLOOD PRESSURE: 60 MMHG | SYSTOLIC BLOOD PRESSURE: 120 MMHG | HEIGHT: 69 IN

## 2019-11-20 DIAGNOSIS — Z00.00 ROUTINE GENERAL MEDICAL EXAMINATION AT A HEALTH CARE FACILITY: Primary | ICD-10-CM

## 2019-11-20 DIAGNOSIS — Z30.09 GENERAL COUNSELING FOR PRESCRIPTION OF ORAL CONTRACEPTIVES: ICD-10-CM

## 2019-11-20 PROCEDURE — G0145 SCR C/V CYTO,THINLAYER,RESCR: HCPCS | Performed by: INTERNAL MEDICINE

## 2019-11-20 PROCEDURE — 87624 HPV HI-RISK TYP POOLED RSLT: CPT | Performed by: INTERNAL MEDICINE

## 2019-11-20 PROCEDURE — 99396 PREV VISIT EST AGE 40-64: CPT | Performed by: INTERNAL MEDICINE

## 2019-11-20 RX ORDER — LEVONORGESTREL AND ETHINYL ESTRADIOL 0.15-0.03
1 KIT ORAL DAILY
Qty: 91 TABLET | Refills: 3 | Status: SHIPPED | OUTPATIENT
Start: 2019-11-20 | End: 2020-11-30

## 2019-11-20 ASSESSMENT — ENCOUNTER SYMPTOMS
WEAKNESS: 0
NERVOUS/ANXIOUS: 0
DIZZINESS: 0
HEMATOCHEZIA: 0
ARTHRALGIAS: 1
HEMATURIA: 0
FEVER: 0
NAUSEA: 0
EYE PAIN: 0
COUGH: 0
PALPITATIONS: 0
BREAST MASS: 0
HEADACHES: 0
ABDOMINAL PAIN: 0
CHILLS: 0
MYALGIAS: 0
DIARRHEA: 0
HEARTBURN: 1
PARESTHESIAS: 0
CONSTIPATION: 0
SHORTNESS OF BREATH: 0
JOINT SWELLING: 0
SORE THROAT: 0
FREQUENCY: 1
DYSURIA: 0

## 2019-11-20 ASSESSMENT — ANXIETY QUESTIONNAIRES
3. WORRYING TOO MUCH ABOUT DIFFERENT THINGS: NOT AT ALL
6. BECOMING EASILY ANNOYED OR IRRITABLE: NOT AT ALL
1. FEELING NERVOUS, ANXIOUS, OR ON EDGE: SEVERAL DAYS
2. NOT BEING ABLE TO STOP OR CONTROL WORRYING: NOT AT ALL
5. BEING SO RESTLESS THAT IT IS HARD TO SIT STILL: NOT AT ALL
IF YOU CHECKED OFF ANY PROBLEMS ON THIS QUESTIONNAIRE, HOW DIFFICULT HAVE THESE PROBLEMS MADE IT FOR YOU TO DO YOUR WORK, TAKE CARE OF THINGS AT HOME, OR GET ALONG WITH OTHER PEOPLE: NOT DIFFICULT AT ALL
7. FEELING AFRAID AS IF SOMETHING AWFUL MIGHT HAPPEN: NOT AT ALL
GAD7 TOTAL SCORE: 1

## 2019-11-20 ASSESSMENT — PATIENT HEALTH QUESTIONNAIRE - PHQ9
SUM OF ALL RESPONSES TO PHQ QUESTIONS 1-9: 2
5. POOR APPETITE OR OVEREATING: NOT AT ALL

## 2019-11-20 ASSESSMENT — MIFFLIN-ST. JEOR: SCORE: 1601.18

## 2019-11-20 NOTE — PROGRESS NOTES
Chief Complaint   Patient presents with     Physical        SUBJECTIVE:   CC: Cassi Ye is an 44 year old woman who presents for preventive health visit.     Healthy Habits:     Getting at least 3 servings of Calcium per day:  Yes    Bi-annual eye exam:  Yes    Dental care twice a year:  Yes    Sleep apnea or symptoms of sleep apnea:  None    Diet:  Regular (no restrictions)    Frequency of exercise:  4-5 days/week    Duration of exercise:  30-45 minutes    Taking medications regularly:  Yes    Medication side effects:  Lightheadedness    PHQ-2 Total Score: 0    Additional concerns today:  Yes        Today's PHQ-2 Score:   PHQ-2 (  [employer]) 2019   Q1: Little interest or pleasure in doing things 0   Q2: Feeling down, depressed or hopeless 0   PHQ-2 Score 0   Q1: Little interest or pleasure in doing things Not at all   Q2: Feeling down, depressed or hopeless Not at all   PHQ-2 Score 0       Abuse: Current or Past(Physical, Sexual or Emotional)- No  Do you feel safe in your environment? No        Social History     Tobacco Use     Smoking status: Former Smoker     Packs/day: 0.50     Years: 10.00     Pack years: 5.00     Types: Cigarettes     Last attempt to quit: 2006     Years since quittin.6     Smokeless tobacco: Never Used     Tobacco comment: Started in    Substance Use Topics     Alcohol use: Yes     Alcohol/week: 0.0 standard drinks     Comment: 1 -2 drinks month         Alcohol Use 2019   Prescreen: >3 drinks/day or >7 drinks/week? No   Prescreen: >3 drinks/day or >7 drinks/week? -       Reviewed orders with patient.  Reviewed health maintenance and updated orders accordingly - Yes  Labs reviewed in EPIC  BP Readings from Last 3 Encounters:   19 120/60   19 138/88   19 106/68    Wt Readings from Last 3 Encounters:   19 87.7 kg (193 lb 4.8 oz)   19 85.3 kg (188 lb)   19 85.3 kg (188 lb)                  Patient Active Problem List    Diagnosis     Migraine variant     Blood type A+     GERD (gastroesophageal reflux disease)     CARDIOVASCULAR SCREENING; LDL GOAL LESS THAN 160     Impingement syndrome of right shoulder     Tendinitis of right rotator cuff     Past Surgical History:   Procedure Laterality Date     BIOPSY BREAST SEED LOCALIZATION Left 4/15/2019    Procedure: left breast seed localized biopsy, left areola punch biopsy;  Surgeon: Paula Rodriguez MD;  Location: [facility] OR     C NONSPECIFIC PROCEDURE      Cryotherapy     ESOPHAGOSCOPY, GASTROSCOPY, DUODENOSCOPY (EGD), COMBINED N/A 2017    Procedure: COMBINED ESOPHAGOSCOPY, GASTROSCOPY, DUODENOSCOPY (EGD), BIOPSY SINGLE OR MULTIPLE;  ESOPHAGOSCOPY, GASTROSCOPY, DUODENOSCOPY (EGD) with biopsies by cold forcep.  ;  Surgeon: Mario Dey MD;  Location: [facility] GI       Social History     Tobacco Use     Smoking status: Former Smoker     Packs/day: 0.50     Years: 10.00     Pack years: 5.00     Types: Cigarettes     Last attempt to quit: 2006     Years since quittin.6     Smokeless tobacco: Never Used     Tobacco comment: Started in    Substance Use Topics     Alcohol use: Yes     Alcohol/week: 0.0 standard drinks     Comment: 1 -2 drinks month     Family History   Problem Relation Age of Onset     Family History Negative Brother      Lipids Father      Neurologic Disorder Father         migraines     Family History Negative Mother      Breast Cancer Paternal Aunt      Breast Cancer Paternal Grandmother      Blood Disease Other         paternal uncle with leukemia     Cancer Paternal Aunt         ovarian cancer         Current Outpatient Medications   Medication Sig Dispense Refill     levonorgestrel-ethinyl estradiol (INTROVALE) 0.15-0.03 MG tablet Take 1 tablet by mouth daily 91 tablet 3     magnesium 250 MG tablet Take 1 tablet by mouth 2 times daily.       MULTIVITAMINS OR TABS 1 TABLET DAILY       nortriptyline (PAMELOR) 25 MG capsule Take 50 mg by mouth At  Bedtime.       omeprazole (PRILOSEC OTC) 20 MG tablet Take 1 tablet by mouth daily.       Riboflavin (VITAMIN B-2 PO) Take 200 mg by mouth 2 times daily.       rizatriptan (MAXALT-MLT) 10 MG disintegrating tablet Take 10 mg by mouth at onset of headache.       VITAMIN D, CHOLECALCIFEROL, PO Take 1,000 Units by mouth daily       Allergies   Allergen Reactions     Penicillins Nausea and Vomiting and Rash     rash     Recent Labs   Lab Test 11/02/15  1716 10/23/13  1722 02/02/12  1638   LDL  --  109 87   HDL  --  45* 48*   TRIG  --  118 71   CR 0.77  --   --    GFRESTIMATED 84  --   --    GFRESTBLACK >90   GFR Calc    --   --    POTASSIUM 3.8  --   --    TSH 1.14  --   --             Pertinent mammograms are reviewed under the imaging tab.  History of abnormal Pap smear:   PAP / HPV Latest Ref Rng & Units 11/7/2016 10/23/2013 2/2/2012   PAP - NIL NIL NIL   HPV 16 DNA NEG Negative - -   HPV 18 DNA NEG Negative - -   OTHER HR HPV NEG Negative - -     Reviewed and updated as needed this visit by clinical staff  Tobacco  Allergies  Meds  Med Hx  Surg Hx  Fam Hx  Soc Hx        Reviewed and updated as needed this visit by Provider  Tobacco  Allergies  Meds  Problems  Med Hx  Surg Hx  Fam Hx        Past Medical History:   Diagnosis Date     Gastroesophageal reflux disease      Pain in joint, pelvic region and thigh      Variants of migraine, not elsewhere classified, without mention of intractable migraine without mention of status migrainosus     Nasal spray      Past Surgical History:   Procedure Laterality Date     BIOPSY BREAST SEED LOCALIZATION Left 4/15/2019    Procedure: left breast seed localized biopsy, left areola punch biopsy;  Surgeon: Paula Rodriguez MD;  Location: [facility] OR     C NONSPECIFIC PROCEDURE  12/00    Cryotherapy     ESOPHAGOSCOPY, GASTROSCOPY, DUODENOSCOPY (EGD), COMBINED N/A 4/20/2017    Procedure: COMBINED ESOPHAGOSCOPY, GASTROSCOPY, DUODENOSCOPY (EGD), BIOPSY SINGLE OR  "MULTIPLE;  ESOPHAGOSCOPY, GASTROSCOPY, DUODENOSCOPY (EGD) with biopsies by cold forcep.  ;  Surgeon: Mario Dey MD;  Location:  GI       Review of Systems   Constitutional: Negative for chills and fever.   HENT: Negative for hearing loss and sore throat.         She would like ears checked; she has used Debrox   Eyes: Negative for pain and visual disturbance.   Respiratory: Negative for cough and shortness of breath.    Cardiovascular: Negative for chest pain, palpitations and peripheral edema.   Gastrointestinal: Positive for heartburn. Negative for abdominal pain, constipation, diarrhea, hematochezia and nausea.   Breasts:  Negative for tenderness, breast mass and discharge.   Genitourinary: Positive for frequency. Negative for dysuria, genital sores, hematuria, pelvic pain and vaginal bleeding.        OCP use reviewed; denies break through bleeding, no discharge   Musculoskeletal: Positive for arthralgias (keeping active. ). Negative for joint swelling and myalgias.   Skin: Negative for rash.   Neurological: Negative for dizziness, weakness, headaches and paresthesias.   Psychiatric/Behavioral: Negative for mood changes. The patient is not nervous/anxious.         OBJECTIVE:   /60   Pulse 94   Temp 98  F (36.7  C) (Oral)   Resp 15   Ht 1.753 m (5' 9\")   Wt 87.7 kg (193 lb 4.8 oz)   LMP 10/21/2019 (Approximate)   SpO2 100%   BMI 28.55 kg/m    Physical Exam  GENERAL: healthy, alert and no distress  EYES: Eyes grossly normal to inspection  HENT: ear canals and TM's normal, nose and mouth without ulcers or lesions, oropharynx clear and oral mucous membranes moist  NECK: no adenopathy, no asymmetry, masses, or scars and thyroid normal to palpation  RESP: lungs clear to auscultation - no rales, rhonchi or wheezes  BREAST: normal without masses, tenderness or nipple discharge and no palpable axillary masses or adenopathy  CV: regular rate and rhythm, normal S1 S2, no S3 or S4, no murmur, click or " "rub, no peripheral edema and peripheral pulses strong  ABDOMEN: soft, nontender, no hepatosplenomegaly, no masses and bowel sounds normal   (female): normal female external genitalia, normal urethral meatus , vaginal mucosa pink, moist, well rugated, normal cervix, adnexae, and uterus without masses. and pap obtained  RECTAL (female): normal sphincter tone, no rectal masses  MS: no gross musculoskeletal defects noted, no edema  NEURO: Normal strength and tone, mentation intact and speech normal  PSYCH: mentation appears normal, affect normal/bright    Diagnostic Test Results:  Labs reviewed in Epic    ASSESSMENT/PLAN:   (Z00.00) Routine general medical examination at a health care facility  (primary encounter diagnosis)  Comment: HEALTH CARE MAINTENANCE reviewed  Plan: Pap imaged thin layer screen with HPV -         recommended age 30 - 65 years (select HPV order        below), HPV High Risk Types DNA Cervical          (Z30.09) General counseling for prescription of oral contraceptives  Comment: 3 month OCP working well  Plan: levonorgestrel-ethinyl estradiol (INTROVALE)         0.15-0.03 MG tablet            COUNSELING:  Reviewed preventive health counseling, as reflected in patient instructions       Regular exercise       Healthy diet/nutrition    Estimated body mass index is 27.76 kg/m  as calculated from the following:    Height as of 6/4/19: 1.753 m (5' 9\").    Weight as of 6/4/19: 85.3 kg (188 lb).    Weight management plan: Discussed healthy diet and exercise guidelines     reports that she quit smoking about 13 years ago. Her smoking use included cigarettes. She has a 5.00 pack-year smoking history. She has never used smokeless tobacco.      Counseling Resources:  ATP IV Guidelines  Pooled Cohorts Equation Calculator  Breast Cancer Risk Calculator  FRAX Risk Assessment  ICSI Preventive Guidelines  Dietary Guidelines for Americans, 2010  USDA's MyPlate  ASA Prophylaxis  Lung CA Screening    Kim Harris " MD [place]  Internal Medicine   [facility]

## 2019-11-21 ASSESSMENT — ANXIETY QUESTIONNAIRES: GAD7 TOTAL SCORE: 1

## 2019-11-26 LAB
COPATH REPORT: NORMAL
PAP: NORMAL

## 2019-11-27 LAB
FINAL DIAGNOSIS: NORMAL
HPV HR 12 DNA CVX QL NAA+PROBE: NEGATIVE
HPV16 DNA SPEC QL NAA+PROBE: NEGATIVE
HPV18 DNA SPEC QL NAA+PROBE: NEGATIVE
SPECIMEN DESCRIPTION: NORMAL
SPECIMEN SOURCE CVX/VAG CYTO: NORMAL

## 2019-12-17 ENCOUNTER — HOSPITAL ENCOUNTER (OUTPATIENT)
Dept: MAMMOGRAPHY | Facility: CLINIC | Age: 44
Discharge: HOME | End: 2019-12-17
Attending: INTERNAL MEDICINE | Admitting: INTERNAL MEDICINE
Payer: COMMERCIAL

## 2019-12-17 DIAGNOSIS — Z12.31 VISIT FOR SCREENING MAMMOGRAM: ICD-10-CM

## 2019-12-17 PROCEDURE — 77067 SCR MAMMO BI INCL CAD: CPT

## 2019-12-26 ENCOUNTER — HOSPITAL ENCOUNTER (OUTPATIENT)
Dept: ULTRASOUND IMAGING | Facility: CLINIC | Age: 44
End: 2019-12-26
Attending: INTERNAL MEDICINE
Payer: COMMERCIAL

## 2019-12-26 ENCOUNTER — HOSPITAL ENCOUNTER (OUTPATIENT)
Dept: MAMMOGRAPHY | Facility: CLINIC | Age: 44
Discharge: HOME | End: 2019-12-26
Attending: INTERNAL MEDICINE | Admitting: INTERNAL MEDICINE
Payer: COMMERCIAL

## 2019-12-26 DIAGNOSIS — R92.8 ABNORMAL MAMMOGRAM: ICD-10-CM

## 2019-12-26 PROCEDURE — G0279 TOMOSYNTHESIS, MAMMO: HCPCS

## 2019-12-26 PROCEDURE — 76642 ULTRASOUND BREAST LIMITED: CPT | Mod: RT

## 2020-02-06 ENCOUNTER — TRANSFERRED RECORDS (OUTPATIENT)
Dept: HEALTH INFORMATION MANAGEMENT | Facility: CLINIC | Age: 45
End: 2020-02-06

## 2020-07-09 ENCOUNTER — TRANSFERRED RECORDS (OUTPATIENT)
Dept: HEALTH INFORMATION MANAGEMENT | Facility: CLINIC | Age: 45
End: 2020-07-09

## 2020-08-10 ENCOUNTER — TRANSFERRED RECORDS (OUTPATIENT)
Dept: HEALTH INFORMATION MANAGEMENT | Facility: CLINIC | Age: 45
End: 2020-08-10

## 2020-08-18 ENCOUNTER — MYC MEDICAL ADVICE (OUTPATIENT)
Dept: FAMILY MEDICINE | Facility: CLINIC | Age: 45
End: 2020-08-18

## 2020-08-18 NOTE — TELEPHONE ENCOUNTER
Pt called back in-   She is concerned/frustrated with this drainage going on for over 1 month. She is NOT looking for a prescription at this time.     She has tried Flonase for approx 2 weeks and it caused bad headaches. She will not use again.  She is currently doing Claritin 1/2 tablet daily without much relief.     Discussed about home remedies: humidifier, saline nasal spray 1-2 sprays in nares 1-2 times daily, close windows where she sleeps, increase fluid intake to help thin secretions, avoid dairy products when able.     Pt is agreeable to these suggestions. If worsens or not working pt will either make appt- office, phone, video, or complete an e-visit.     Kimberly SCHULTE RN

## 2020-11-28 ASSESSMENT — ENCOUNTER SYMPTOMS
ABDOMINAL PAIN: 0
ARTHRALGIAS: 1
COUGH: 0
SHORTNESS OF BREATH: 0
HEARTBURN: 1
BREAST MASS: 0
NERVOUS/ANXIOUS: 1
HEMATURIA: 0
FREQUENCY: 1
WEAKNESS: 0
EYE PAIN: 0
PARESTHESIAS: 0
CONSTIPATION: 0
HEMATOCHEZIA: 0
JOINT SWELLING: 0
MYALGIAS: 0
SORE THROAT: 0
NAUSEA: 0
DIARRHEA: 0
HEADACHES: 1
FEVER: 0
DIZZINESS: 0
CHILLS: 0
DYSURIA: 0
PALPITATIONS: 0

## 2020-11-30 ENCOUNTER — OFFICE VISIT (OUTPATIENT)
Dept: FAMILY MEDICINE | Facility: CLINIC | Age: 45
End: 2020-11-30
Payer: COMMERCIAL

## 2020-11-30 ENCOUNTER — MYC MEDICAL ADVICE (OUTPATIENT)
Dept: FAMILY MEDICINE | Facility: CLINIC | Age: 45
End: 2020-11-30

## 2020-11-30 VITALS
DIASTOLIC BLOOD PRESSURE: 68 MMHG | OXYGEN SATURATION: 100 % | WEIGHT: 190.5 LBS | TEMPERATURE: 98 F | HEIGHT: 69 IN | HEART RATE: 88 BPM | RESPIRATION RATE: 14 BRPM | BODY MASS INDEX: 28.22 KG/M2 | SYSTOLIC BLOOD PRESSURE: 118 MMHG

## 2020-11-30 DIAGNOSIS — J30.0 VASOMOTOR RHINITIS: ICD-10-CM

## 2020-11-30 DIAGNOSIS — Z12.31 VISIT FOR SCREENING MAMMOGRAM: ICD-10-CM
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
## 2020-11-30 DIAGNOSIS — Z13.6 CARDIOVASCULAR SCREENING; LDL GOAL LESS THAN 160: ICD-10-CM

## 2020-11-30 DIAGNOSIS — S80.862A INSECT BITE OF LEFT LOWER EXTREMITY, INITIAL ENCOUNTER: ICD-10-CM

## 2020-11-30 DIAGNOSIS — K21.9 GASTROESOPHAGEAL REFLUX DISEASE WITHOUT ESOPHAGITIS: ICD-10-CM

## 2020-11-30 DIAGNOSIS — Z00.00 ROUTINE GENERAL MEDICAL EXAMINATION AT A HEALTH CARE FACILITY: Primary | ICD-10-CM

## 2020-11-30 DIAGNOSIS — G43.809 MIGRAINE VARIANT: ICD-10-CM

## 2020-11-30 DIAGNOSIS — Z30.09 GENERAL COUNSELING FOR PRESCRIPTION OF ORAL CONTRACEPTIVES: ICD-10-CM

## 2020-11-30 PROCEDURE — 99396 PREV VISIT EST AGE 40-64: CPT | Performed by: INTERNAL MEDICINE

## 2020-11-30 RX ORDER — LEVONORGESTREL AND ETHINYL ESTRADIOL 0.15-0.03
1 KIT ORAL DAILY
Qty: 91 TABLET | Refills: 3 | Status: SHIPPED | OUTPATIENT
Start: 2020-11-30 | End: 2021-12-16

## 2020-11-30 RX ORDER — ZONISAMIDE 100 MG/1
1 CAPSULE ORAL AT BEDTIME
COMMUNITY
Start: 2020-07-09

## 2020-11-30 ASSESSMENT — ENCOUNTER SYMPTOMS
FEVER: 0
CHILLS: 0
HEARTBURN: 1
NERVOUS/ANXIOUS: 1
JOINT SWELLING: 0
CONSTIPATION: 0
MYALGIAS: 0
DIARRHEA: 0
HEADACHES: 1
PARESTHESIAS: 0
HEMATOCHEZIA: 0
ABDOMINAL PAIN: 0
SHORTNESS OF BREATH: 0
DYSURIA: 0
EYE PAIN: 0
FREQUENCY: 1
NAUSEA: 0
BREAST MASS: 0
WEAKNESS: 0
PALPITATIONS: 0
SORE THROAT: 0
COUGH: 0
DIZZINESS: 0
ARTHRALGIAS: 1
RHINORRHEA: 1
HEMATURIA: 0

## 2020-11-30 ASSESSMENT — MIFFLIN-ST. JEOR: SCORE: 1583.48

## 2020-11-30 NOTE — PROGRESS NOTES
Chief Complaint   Patient presents with     Physical     had labs done at work and will send the results to us thru my chart        SUBJECTIVE:   CC: Cassi Ye is an 45 year old woman who presents for preventive health visit.       Patient has been advised of split billing requirements and indicates understanding: Yes  Healthy Habits:     Getting at least 3 servings of Calcium per day:  Yes    Bi-annual eye exam:  Yes    Dental care twice a year:  Yes    Sleep apnea or symptoms of sleep apnea:  None    Diet:  Regular (no restrictions)    Frequency of exercise:  4-5 days/week    Duration of exercise:  30-45 minutes    Taking medications regularly:  Yes    Barriers to taking medications:  None    Medication side effects:  Lightheadedness    PHQ-2 Total Score: 1    Additional concerns today:  Yes        Today's PHQ-2 Score:   PHQ-2 (  [employer]) 2020   Q1: Little interest or pleasure in doing things 0   Q2: Feeling down, depressed or hopeless 1   PHQ-2 Score 1   Q1: Little interest or pleasure in doing things Not at all   Q2: Feeling down, depressed or hopeless Several days   PHQ-2 Score 1       Abuse: Current or Past (Physical, Sexual or Emotional) - No  Do you feel safe in your environment? Yes        Social History     Tobacco Use     Smoking status: Former Smoker     Packs/day: 0.50     Years: 10.00     Pack years: 5.00     Types: Cigarettes     Quit date: 2006     Years since quittin.6     Smokeless tobacco: Never Used     Tobacco comment: Started in    Substance Use Topics     Alcohol use: Yes     Alcohol/week: 0.0 standard drinks     Comment: 1 -2 drinks month         Alcohol Use 2020   Prescreen: >3 drinks/day or >7 drinks/week? No   Prescreen: >3 drinks/day or >7 drinks/week? -       Reviewed orders with patient.  Reviewed health maintenance and updated orders accordingly - Yes  Labs reviewed in EPIC  BP Readings from Last 3 Encounters:   20 118/68   19 120/60    19 138/88    Wt Readings from Last 3 Encounters:   20 86.4 kg (190 lb 8 oz)   19 87.7 kg (193 lb 4.8 oz)   19 85.3 kg (188 lb)                  Patient Active Problem List   Diagnosis     Migraine variant     Blood type A+     GERD (gastroesophageal reflux disease)     CARDIOVASCULAR SCREENING; LDL GOAL LESS THAN 160     Impingement syndrome of right shoulder     Tendinitis of right rotator cuff     Past Surgical History:   Procedure Laterality Date     BIOPSY BREAST SEED LOCALIZATION Left 4/15/2019    Procedure: left breast seed localized biopsy, left areola punch biopsy;  Surgeon: Paula Rodriguez MD;  Location: [facility] OR     ESOPHAGOSCOPY, GASTROSCOPY, DUODENOSCOPY (EGD), COMBINED N/A 2017    Procedure: COMBINED ESOPHAGOSCOPY, GASTROSCOPY, DUODENOSCOPY (EGD), BIOPSY SINGLE OR MULTIPLE;  ESOPHAGOSCOPY, GASTROSCOPY, DUODENOSCOPY (EGD) with biopsies by cold forcep.  ;  Surgeon: Mario Dey MD;  Location:  GI     ZZC NONSPECIFIC PROCEDURE      Cryotherapy       Social History     Tobacco Use     Smoking status: Former Smoker     Packs/day: 0.50     Years: 10.00     Pack years: 5.00     Types: Cigarettes     Quit date: 2006     Years since quittin.6     Smokeless tobacco: Never Used     Tobacco comment: Started in    Substance Use Topics     Alcohol use: Yes     Alcohol/week: 0.0 standard drinks     Comment: 1 -2 drinks month     Family History   Problem Relation Age of Onset     Family History Negative Brother      Lipids Father      Neurologic Disorder Father         migraines     Family History Negative Mother      Breast Cancer Paternal Aunt      Breast Cancer Paternal Grandmother      Blood Disease Other         paternal uncle with leukemia     Cancer Paternal Aunt         ovarian cancer         Current Outpatient Medications   Medication Sig Dispense Refill     levonorgestrel-ethinyl estradiol (INTROVALE) 0.15-0.03 MG tablet Take 1 tablet by mouth daily 91  tablet 3     magnesium 250 MG tablet Take 1 tablet by mouth 2 times daily.       MULTIVITAMINS OR TABS 1 TABLET DAILY       nortriptyline (PAMELOR) 25 MG capsule Take 50 mg by mouth At Bedtime.       omeprazole (PRILOSEC OTC) 20 MG tablet Take 1 tablet by mouth daily.       Riboflavin (VITAMIN B-2 PO) Take 200 mg by mouth 2 times daily.       rizatriptan (MAXALT-MLT) 10 MG disintegrating tablet Take 10 mg by mouth at onset of headache.       VITAMIN D, CHOLECALCIFEROL, PO Take 1,000 Units by mouth daily       zonisamide (ZONEGRAN) 100 MG capsule Take 1 capsule by mouth At Bedtime       Allergies   Allergen Reactions     Penicillins Nausea and Vomiting and Rash     rash     Recent Labs   Lab Test 11/02/15  1716 10/23/13  1722   LDL  --  109   HDL  --  45*   TRIG  --  118   CR 0.77  --    GFRESTIMATED 84  --    GFRESTBLACK >90   GFR Calc    --    POTASSIUM 3.8  --    TSH 1.14  --         Mammogram Screening: Patient under age 50, mutual decision reflected in health maintenance.      Pertinent mammograms are reviewed under the imaging tab.  History of abnormal Pap smear: NO - age 30- 65 PAP every 3 years recommended  PAP / HPV Latest Ref Rng & Units 11/20/2019 11/7/2016 10/23/2013   PAP - NIL NIL NIL   HPV 16 DNA NEG:Negative Negative Negative -   HPV 18 DNA NEG:Negative Negative Negative -   OTHER HR HPV NEG:Negative Negative Negative -     Reviewed and updated as needed this visit by clinical staff  Tobacco  Allergies  Meds   Med Hx  Surg Hx  Fam Hx  Soc Hx        Reviewed and updated as needed this visit by Provider  Tobacco  Allergies  Meds  Problems  Med Hx  Surg Hx  Fam Hx         Past Medical History:   Diagnosis Date     Gastroesophageal reflux disease      Pain in joint, pelvic region and thigh      Variants of migraine, not elsewhere classified, without mention of intractable migraine without mention of status migrainosus     Nasal spray      Past Surgical History:   Procedure  "Laterality Date     BIOPSY BREAST SEED LOCALIZATION Left 4/15/2019    Procedure: left breast seed localized biopsy, left areola punch biopsy;  Surgeon: Paula Rodriguez MD;  Location:  OR     ESOPHAGOSCOPY, GASTROSCOPY, DUODENOSCOPY (EGD), COMBINED N/A 4/20/2017    Procedure: COMBINED ESOPHAGOSCOPY, GASTROSCOPY, DUODENOSCOPY (EGD), BIOPSY SINGLE OR MULTIPLE;  ESOPHAGOSCOPY, GASTROSCOPY, DUODENOSCOPY (EGD) with biopsies by cold forcep.  ;  Surgeon: Mario Dye MD;  Location:  GI     Z NONSPECIFIC PROCEDURE  12/00    Cryotherapy       Review of Systems   Constitutional: Negative for chills and fever.   HENT: Positive for rhinorrhea (clear; no congestion; but notes post nasal drainage. ). Negative for congestion, ear pain, hearing loss and sore throat.    Eyes: Negative for pain and visual disturbance.   Respiratory: Negative for cough and shortness of breath.    Cardiovascular: Negative for chest pain, palpitations and peripheral edema.   Gastrointestinal: Positive for heartburn (past EGD, using PPI daily). Negative for abdominal pain, constipation, diarrhea, hematochezia and nausea.   Breasts:  Negative for tenderness, breast mass and discharge.   Genitourinary: Positive for frequency (drinks fair amount of water) and urgency (no dysuria; increased intake noted). Negative for dysuria, genital sores, hematuria, pelvic pain, vaginal bleeding and vaginal discharge.   Musculoskeletal: Positive for arthralgias. Negative for joint swelling and myalgias.   Skin: Negative for rash.   Neurological: Positive for headaches (Migraines- sees [facility]). Negative for dizziness, weakness and paresthesias.   Psychiatric/Behavioral: Negative for mood changes. The patient is nervous/anxious.           OBJECTIVE:   /68   Pulse 100   Temp 98  F (36.7  C) (Oral)   Resp 14   Ht 1.753 m (5' 9\")   Wt 86.4 kg (190 lb 8 oz)   SpO2 100%   BMI 28.13 kg/m    Physical Exam  GENERAL: healthy, alert and no distress  EYES: " Eyes grossly normal to inspection  HENT: normal cephalic/atraumatic, ear canals and TM's normal, nose and mouth without ulcers or lesions, nasal mucosa edematous , rhinorrhea clear and oropharynx clear  NECK: no adenopathy, no asymmetry, masses, or scars and thyroid normal to palpation  RESP: lungs clear to auscultation - no rales, rhonchi or wheezes  BREAST: normal without masses, tenderness or nipple discharge and no palpable axillary masses or adenopathy  CV: regular rate and rhythm, normal S1 S2, no S3 or S4, no murmur, click or rub, no peripheral edema and peripheral pulses strong  ABDOMEN: soft, nontender, no hepatosplenomegaly, no masses and bowel sounds normal  MS: no gross musculoskeletal defects noted, no edema; upper trapezius , bilateral tightness.   SKIN: right lateral proximal leg one 0.9 x 0.7 cm scaly erythema macular papular lesion with center irritation ( ?bite)  NEURO: Normal strength and tone, sensory exam grossly normal, mentation intact, gait normal including heel/toe/tandem walking and Romberg normal  PSYCH: mentation appears normal, affect normal/bright    Diagnostic Test Results:  Labs reviewed in Epic    ASSESSMENT/PLAN:   (Z00.00) Routine general medical examination at a health care facility  (primary encounter diagnosis)  Comment: HEALTH CARE MAINTENANCE   Plan: immunizations up to date;     (Z30.09) General counseling for prescription of oral contraceptives  Comment: OCP use reviewed  Plan: levonorgestrel-ethinyl estradiol (INTROVALE) 0.15-0.03 MG tablet          (Z12.31) Visit for screening mammogram  Comment: last done 12/26/2019  Plan: MA Screening Digital Bilateral          (K21.9) Gastroesophageal reflux disease without esophagitis  Comment: triggers reviewed; past EGD; she has been on daily PPI; reviewed Vit D3 and B12  Plan: Omeprazole; Pt reminded of benefits of maximizing calcium and vit D to help with bone health (due to being on PPI)    (Z13.6) CARDIOVASCULAR SCREENING; LDL  "GOAL LESS THAN 160  Comment: labs done through work; she will send results.   Plan:     (G43.809) Migraine variant  Comment: followed by [facility]; Nortriptyline daily, Maxalt for episodic tx; Zonegran recently added; was 3-4 per week, now 1-2 per week  Plan:     (J30.0) Vasomotor rhinitis  Comment: has been using Claritin with +/- benefit; discussed adding Flonase 2 spray per day x 3 weeks then 1 spray OU daily; consider maintenance 1 spray/d alt sides.  Plan: could also consider, trial of Asteline antihistamine nasal spray    Bite on right prox leg- overlying scale. No ulceration; no surrounding induration.   Avoid scratching, ok to use topical steroid    Patient has been advised of split billing requirements and indicates understanding: Yes  COUNSELING:  Reviewed preventive health counseling, as reflected in patient instructions       Regular exercise       Healthy diet/nutrition       Immunizations    Reviewed- up to date.          Contraception    Estimated body mass index is 28.13 kg/m  as calculated from the following:    Height as of this encounter: 1.753 m (5' 9\").    Weight as of this encounter: 86.4 kg (190 lb 8 oz).    Weight management plan: Discussed healthy diet and exercise guidelines    She reports that she quit smoking about 14 years ago. Her smoking use included cigarettes. She has a 5.00 pack-year smoking history. She has never used smokeless tobacco.      Counseling Resources:  ATP IV Guidelines  Pooled Cohorts Equation Calculator  Breast Cancer Risk Calculator  BRCA-Related Cancer Risk Assessment: FHS-7 Tool  FRAX Risk Assessment  ICSI Preventive Guidelines  Dietary Guidelines for Americans, 2010  USDA's MyPlate  ASA Prophylaxis  Lung CA Screening    Kim Penn MD  Internal Medicine   [facility]  "

## 2021-01-11 ENCOUNTER — HOSPITAL ENCOUNTER (OUTPATIENT)
Dept: MAMMOGRAPHY | Facility: CLINIC | Age: 46
Discharge: HOME | End: 2021-01-11
Attending: INTERNAL MEDICINE | Admitting: INTERNAL MEDICINE
Payer: COMMERCIAL

## 2021-01-11 DIAGNOSIS — Z12.31 VISIT FOR SCREENING MAMMOGRAM: ICD-10-CM

## 2021-01-11 PROCEDURE — 77067 SCR MAMMO BI INCL CAD: CPT

## 2021-05-17 ENCOUNTER — TRANSFERRED RECORDS (OUTPATIENT)
Dept: HEALTH INFORMATION MANAGEMENT | Facility: CLINIC | Age: 46
End: 2021-05-17

## 2021-07-12 ENCOUNTER — TRANSFERRED RECORDS (OUTPATIENT)
Dept: HEALTH INFORMATION MANAGEMENT | Facility: CLINIC | Age: 46
End: 2021-07-12

## 2021-07-30 ENCOUNTER — OFFICE VISIT (OUTPATIENT)
Dept: URGENT CARE | Facility: URGENT CARE | Age: 46
End: 2021-07-30
Payer: COMMERCIAL

## 2021-07-30 VITALS
HEART RATE: 84 BPM | OXYGEN SATURATION: 100 % | TEMPERATURE: 98.7 F | SYSTOLIC BLOOD PRESSURE: 123 MMHG | DIASTOLIC BLOOD PRESSURE: 83 MMHG

## 2021-07-30 DIAGNOSIS — S01.01XA LACERATION OF SCALP, INITIAL ENCOUNTER: Primary | ICD-10-CM

## 2021-07-30 PROCEDURE — 12001 RPR S/N/AX/GEN/TRNK 2.5CM/<: CPT | Performed by: FAMILY MEDICINE

## 2021-07-30 ASSESSMENT — ENCOUNTER SYMPTOMS: DIZZINESS: 0

## 2021-07-31 NOTE — PROGRESS NOTES
"  Assessment & Plan     Laceration of scalp, initial encounter  1 cm shallow laceration on her scalp was closed using adhesive good approximation of the borders.  Standard wound care instructions given, advised to monitor for any signs of infections including fever, drainage, redness or pain; to be evaluated at the time if it occurs.  Up-to-date on Tdap.  - WOUND CLOSURE BY ADHESIVE []               BMI:   Estimated body mass index is 28.13 kg/m  as calculated from the following:    Height as of 11/30/20: 1.753 m (5' 9\").    Weight as of 11/30/20: 86.4 kg (190 lb 8 oz).           Return in about 3 days (around 8/2/2021) for If symptoms do not improve or gets worse..    Rajat Meeks MD  [facility]    Rick Ye is a 45 year old who presents for the following health issues     HPI   Patient bumped her head on an open cabinet approximately 1 hours ago.  Bleeding stopped with pressure.  Tdap was on 10-      Review of Systems   Neurological: Negative for dizziness.            Objective    /83   Pulse 84   Temp 98.7  F (37.1  C) (Tympanic)   SpO2 100%   There is no height or weight on file to calculate BMI.  Physical Exam  HENT:      Head:        Comments: 1 cm laceration on her scalp, no foreign body, clean wound edges.                                 "

## 2021-12-14 DIAGNOSIS — Z30.09 GENERAL COUNSELING FOR PRESCRIPTION OF ORAL CONTRACEPTIVES: ICD-10-CM

## 2021-12-16 RX ORDER — LEVONORGESTREL AND ETHINYL ESTRADIOL 0.15-0.03
KIT ORAL
Qty: 91 TABLET | Refills: 0 | Status: SHIPPED | OUTPATIENT
Start: 2021-12-16 | End: 2022-02-01

## 2021-12-16 NOTE — TELEPHONE ENCOUNTER
Prescription approved per [facility] Refill Protocol.    Next 5 appointments (look out 90 days)    Feb 01, 2022  7:00 AM  (Arrive by 6:40 AM)  Adult Preventative Visit with Kim Penn MD  [facility] ([facility] ) [address] [address] [id number]  [phone]        Jojo Gonzalez RN

## 2022-01-27 ASSESSMENT — ENCOUNTER SYMPTOMS
MYALGIAS: 0
DYSURIA: 0
BREAST MASS: 0
COUGH: 0
JOINT SWELLING: 0
NERVOUS/ANXIOUS: 0
NAUSEA: 0
DIARRHEA: 0
EYE PAIN: 0
SHORTNESS OF BREATH: 0
HEADACHES: 1
HEARTBURN: 1
ABDOMINAL PAIN: 0
PARESTHESIAS: 0
CHILLS: 0
HEMATURIA: 0
PALPITATIONS: 0
CONSTIPATION: 0
ARTHRALGIAS: 1
SORE THROAT: 0
FEVER: 0
WEAKNESS: 0
HEMATOCHEZIA: 0
DIZZINESS: 0
FREQUENCY: 1

## 2022-02-01 ENCOUNTER — OFFICE VISIT (OUTPATIENT)
Dept: FAMILY MEDICINE | Facility: CLINIC | Age: 47
End: 2022-02-01
Payer: COMMERCIAL

## 2022-02-01 ENCOUNTER — MYC MEDICAL ADVICE (OUTPATIENT)
Dept: FAMILY MEDICINE | Facility: CLINIC | Age: 47
End: 2022-02-01

## 2022-02-01 VITALS
TEMPERATURE: 98.7 F | HEART RATE: 94 BPM | DIASTOLIC BLOOD PRESSURE: 66 MMHG | BODY MASS INDEX: 30.32 KG/M2 | SYSTOLIC BLOOD PRESSURE: 118 MMHG | WEIGHT: 211.8 LBS | RESPIRATION RATE: 16 BRPM | OXYGEN SATURATION: 100 % | HEIGHT: 70 IN

## 2022-02-01 DIAGNOSIS — R35.0 URINE FREQUENCY: ICD-10-CM

## 2022-02-01 DIAGNOSIS — Z00.00 ROUTINE GENERAL MEDICAL EXAMINATION AT A HEALTH CARE FACILITY: Primary | ICD-10-CM

## 2022-02-01 DIAGNOSIS — R82.71 BACTERIURIA: ICD-10-CM

## 2022-02-01 DIAGNOSIS — G43.809 MIGRAINE VARIANT: ICD-10-CM

## 2022-02-01 DIAGNOSIS — Z30.09 GENERAL COUNSELING FOR PRESCRIPTION OF ORAL CONTRACEPTIVES: ICD-10-CM

## 2022-02-01 DIAGNOSIS — M75.41 IMPINGEMENT SYNDROME OF RIGHT SHOULDER: ICD-10-CM

## 2022-02-01 DIAGNOSIS — Z13.6 CARDIOVASCULAR SCREENING; LDL GOAL LESS THAN 160: ICD-10-CM

## 2022-02-01 LAB
ALBUMIN SERPL-MCNC: 3.7 G/DL (ref 3.4–5)
ALBUMIN UR-MCNC: NEGATIVE MG/DL
ALP SERPL-CCNC: 71 U/L (ref 40–150)
ALT SERPL W P-5'-P-CCNC: 43 U/L (ref 0–50)
ANION GAP SERPL CALCULATED.3IONS-SCNC: 4 MMOL/L (ref 3–14)
APPEARANCE UR: CLEAR
AST SERPL W P-5'-P-CCNC: 21 U/L (ref 0–45)
BACTERIA #/AREA URNS HPF: ABNORMAL /HPF
BILIRUB SERPL-MCNC: 0.4 MG/DL (ref 0.2–1.3)
BILIRUB UR QL STRIP: NEGATIVE
BUN SERPL-MCNC: 11 MG/DL (ref 7–30)
CALCIUM SERPL-MCNC: 9.7 MG/DL (ref 8.5–10.1)
CHLORIDE BLD-SCNC: 106 MMOL/L (ref 94–109)
CHOLEST SERPL-MCNC: 204 MG/DL
CO2 SERPL-SCNC: 27 MMOL/L (ref 20–32)
COLOR UR AUTO: YELLOW
CREAT SERPL-MCNC: 0.77 MG/DL (ref 0.52–1.04)
FASTING STATUS PATIENT QL REPORTED: YES
GFR SERPL CREATININE-BSD FRML MDRD: >90 ML/MIN/1.73M2
GLUCOSE BLD-MCNC: 90 MG/DL (ref 70–99)
GLUCOSE UR STRIP-MCNC: NEGATIVE MG/DL
HDLC SERPL-MCNC: 56 MG/DL
HGB UR QL STRIP: NEGATIVE
KETONES UR STRIP-MCNC: NEGATIVE MG/DL
LDLC SERPL CALC-MCNC: 117 MG/DL
LEUKOCYTE ESTERASE UR QL STRIP: ABNORMAL
NITRATE UR QL: POSITIVE
NONHDLC SERPL-MCNC: 148 MG/DL
PH UR STRIP: 8 [PH] (ref 5–7)
POTASSIUM BLD-SCNC: 4 MMOL/L (ref 3.4–5.3)
PROT SERPL-MCNC: 7.5 G/DL (ref 6.8–8.8)
RBC #/AREA URNS AUTO: ABNORMAL /HPF
SODIUM SERPL-SCNC: 137 MMOL/L (ref 133–144)
SP GR UR STRIP: 1.01 (ref 1–1.03)
SQUAMOUS #/AREA URNS AUTO: ABNORMAL /LPF
TRIGL SERPL-MCNC: 154 MG/DL
UROBILINOGEN UR STRIP-ACNC: 0.2 E.U./DL
WBC #/AREA URNS AUTO: ABNORMAL /HPF

## 2022-02-01 PROCEDURE — 99396 PREV VISIT EST AGE 40-64: CPT | Performed by: INTERNAL MEDICINE

## 2022-02-01 PROCEDURE — 87086 URINE CULTURE/COLONY COUNT: CPT | Performed by: INTERNAL MEDICINE

## 2022-02-01 PROCEDURE — 80061 LIPID PANEL: CPT | Performed by: INTERNAL MEDICINE

## 2022-02-01 PROCEDURE — 87186 SC STD MICRODIL/AGAR DIL: CPT | Performed by: INTERNAL MEDICINE

## 2022-02-01 PROCEDURE — 81001 URINALYSIS AUTO W/SCOPE: CPT | Performed by: INTERNAL MEDICINE

## 2022-02-01 PROCEDURE — 36415 COLL VENOUS BLD VENIPUNCTURE: CPT | Performed by: INTERNAL MEDICINE

## 2022-02-01 PROCEDURE — 80053 COMPREHEN METABOLIC PANEL: CPT | Performed by: INTERNAL MEDICINE

## 2022-02-01 RX ORDER — LEVONORGESTREL AND ETHINYL ESTRADIOL 0.15-0.03
1 KIT ORAL DAILY
Qty: 91 TABLET | Refills: 3 | Status: SHIPPED | OUTPATIENT
Start: 2022-02-01 | End: 2023-02-16

## 2022-02-01 ASSESSMENT — ENCOUNTER SYMPTOMS
DIZZINESS: 0
MYALGIAS: 0
CHILLS: 0
PALPITATIONS: 0
JOINT SWELLING: 0
FREQUENCY: 1
HEMATOCHEZIA: 0
HEADACHES: 1
PARESTHESIAS: 0
CONSTIPATION: 0
ABDOMINAL PAIN: 0
HEARTBURN: 1
COUGH: 0
ARTHRALGIAS: 1
DYSURIA: 0
NERVOUS/ANXIOUS: 0
SORE THROAT: 0
DIARRHEA: 0
BREAST MASS: 0
EYE PAIN: 0
NAUSEA: 0
WEAKNESS: 0
HEMATURIA: 0
SHORTNESS OF BREATH: 0
FEVER: 0

## 2022-02-01 ASSESSMENT — MIFFLIN-ST. JEOR: SCORE: 1683.03

## 2022-02-01 NOTE — PROGRESS NOTES
Chief Complaint   Patient presents with     Physical     fasting        SUBJECTIVE:   CC: Cassi Ye is an 46 year old woman who presents for preventive health visit.       Patient has been advised of split billing requirements and indicates understanding: Yes  Healthy Habits:     Getting at least 3 servings of Calcium per day:  Yes    Bi-annual eye exam:  Yes    Dental care twice a year:  Yes    Sleep apnea or symptoms of sleep apnea:  None    Diet:  Regular (no restrictions)    Frequency of exercise:  4-5 days/week    Duration of exercise:  30-45 minutes    Taking medications regularly:  Yes    Medication side effects:  Other    PHQ-2 Total Score: 1    Additional concerns today:  No        Medication Followup of Seasonale birth control     Taking Medication as prescribed: yes    Side Effects:  None    Medication Helping Symptoms:  yes       Today's PHQ-2 Score:   PHQ-2 ( 1999 Pfizer) 1/27/2022   Q1: Little interest or pleasure in doing things 1   Q2: Feeling down, depressed or hopeless 0   PHQ-2 Score 1   PHQ-2 Total Score (12-17 Years)- Positive if 3 or more points; Administer PHQ-A if positive -   Q1: Little interest or pleasure in doing things Several days   Q2: Feeling down, depressed or hopeless Not at all   PHQ-2 Score 1       Abuse: Current or Past (Physical, Sexual or Emotional) - No  Do you feel safe in your environment? Yes        Social History     Tobacco Use     Smoking status: Former Smoker     Packs/day: 0.50     Years: 10.00     Pack years: 5.00     Types: Cigarettes     Quit date: 4/13/2006     Years since quitting: 15.8     Smokeless tobacco: Never Used     Tobacco comment: Started in 1994   Substance Use Topics     Alcohol use: Yes     Alcohol/week: 0.0 standard drinks     Comment: 1 -2 drinks month         Alcohol Use 1/27/2022   Prescreen: >3 drinks/day or >7 drinks/week? No   Prescreen: >3 drinks/day or >7 drinks/week? -       Reviewed orders with patient.  Reviewed health maintenance  and updated orders accordingly - Yes  Labs reviewed in EPIC  BP Readings from Last 3 Encounters:   02/01/22 118/66   07/30/21 123/83   11/30/20 118/68    Wt Readings from Last 3 Encounters:   02/01/22 96.1 kg (211 lb 12.8 oz)   11/30/20 86.4 kg (190 lb 8 oz)   11/20/19 87.7 kg (193 lb 4.8 oz)                  Patient Active Problem List   Diagnosis     Migraine variant     Blood type A+     GERD (gastroesophageal reflux disease)     CARDIOVASCULAR SCREENING; LDL GOAL LESS THAN 160     Impingement syndrome of right shoulder     Tendinitis of right rotator cuff     Past Surgical History:   Procedure Laterality Date     BIOPSY BREAST SEED LOCALIZATION Left 4/15/2019    Procedure: left breast seed localized biopsy, left areola punch biopsy;  Surgeon: Paula Rodriguez MD;  Location:  OR     ESOPHAGOSCOPY, GASTROSCOPY, DUODENOSCOPY (EGD), COMBINED N/A 4/20/2017    Procedure: COMBINED ESOPHAGOSCOPY, GASTROSCOPY, DUODENOSCOPY (EGD), BIOPSY SINGLE OR MULTIPLE;  ESOPHAGOSCOPY, GASTROSCOPY, DUODENOSCOPY (EGD) with biopsies by cold forcep.  ;  Surgeon: Mario Dey MD;  Location:  GI     ZZC NONSPECIFIC PROCEDURE  12/00    Cryotherapy       Social History     Tobacco Use     Smoking status: Former Smoker     Packs/day: 0.50     Years: 10.00     Pack years: 5.00     Types: Cigarettes     Quit date: 4/13/2006     Years since quitting: 15.8     Smokeless tobacco: Never Used     Tobacco comment: Started in 1994   Substance Use Topics     Alcohol use: Yes     Alcohol/week: 0.0 standard drinks     Comment: 1 -2 drinks month     Family History   Problem Relation Age of Onset     Family History Negative Brother      Lipids Father      Neurologic Disorder Father         migraines     Family History Negative Mother      Breast Cancer Paternal Aunt      Breast Cancer Paternal Grandmother      Blood Disease Other         paternal uncle with leukemia     Cancer Paternal Aunt         ovarian cancer         Current Outpatient  Medications   Medication Sig Dispense Refill     levonorgestrel-ethinyl estradiol (SEASONALE) 0.15-0.03 MG tablet Take 1 tablet by mouth daily 91 tablet 3     magnesium 250 MG tablet Take 1 tablet by mouth 2 times daily.       MULTIVITAMINS OR TABS 1 TABLET DAILY       nortriptyline (PAMELOR) 25 MG capsule Take 50 mg by mouth At Bedtime.       omeprazole (PRILOSEC OTC) 20 MG tablet Take 1 tablet by mouth daily.       Riboflavin (VITAMIN B-2 PO) Take 200 mg by mouth 2 times daily.       rizatriptan (MAXALT-MLT) 10 MG disintegrating tablet Take 10 mg by mouth at onset of headache.       VITAMIN D, CHOLECALCIFEROL, PO Take 1,000 Units by mouth daily       zonisamide (ZONEGRAN) 100 MG capsule Take 1 capsule by mouth At Bedtime       Allergies   Allergen Reactions     Seasonal Allergies Other (See Comments)     Penicillins Nausea and Vomiting and Rash     rash     Recent Labs   Lab Test 11/02/15  1716   CR 0.77   GFRESTIMATED 84   GFRESTBLACK >90   GFR Calc     POTASSIUM 3.8   TSH 1.14        Breast Cancer Screening:    FHS-7:   Breast CA Risk Assessment (FHS-7) 1/27/2022   Did any of your first-degree relatives have breast or ovarian cancer? No   Did any of your relatives have bilateral breast cancer? Yes   Did any man in your family have breast cancer? No   Did any woman in your family have breast and ovarian cancer? No   Did any woman in your family have breast cancer before age 50 y? Yes   Do you have 2 or more relatives with breast and/or ovarian cancer? Yes   Do you have 2 or more relatives with breast and/or bowel cancer? No       Mammogram Screening - Offered annual screening and updated Health Maintenance for [place] plan based on risk factor consideration    Pertinent mammograms are reviewed under the imaging tab.    History of abnormal Pap smear: NO - age 30-65 PAP every 5 years with negative HPV co-testing recommended  PAP / HPV Latest Ref Rng & Units 11/20/2019 11/7/2016 10/23/2013   PAP  (Historical) - NIL NIL NIL   HPV16 NEG:Negative Negative Negative -   HPV18 NEG:Negative Negative Negative -   HRHPV NEG:Negative Negative Negative -     Reviewed and updated as needed this visit by clinical staff  Tobacco  Allergies  Meds   Med Hx  Surg Hx  Fam Hx  Soc Hx       Reviewed and updated as needed this visit by Provider  Tobacco  Allergies  Meds  Problems  Med Hx  Surg Hx  Fam Hx        Past Medical History:   Diagnosis Date     Gastroesophageal reflux disease      Pain in joint, pelvic region and thigh      Variants of migraine, not elsewhere classified, without mention of intractable migraine without mention of status migrainosus     Nasal spray      Past Surgical History:   Procedure Laterality Date     BIOPSY BREAST SEED LOCALIZATION Left 4/15/2019    Procedure: left breast seed localized biopsy, left areola punch biopsy;  Surgeon: Paula Rodriguez MD;  Location:  OR     ESOPHAGOSCOPY, GASTROSCOPY, DUODENOSCOPY (EGD), COMBINED N/A 4/20/2017    Procedure: COMBINED ESOPHAGOSCOPY, GASTROSCOPY, DUODENOSCOPY (EGD), BIOPSY SINGLE OR MULTIPLE;  ESOPHAGOSCOPY, GASTROSCOPY, DUODENOSCOPY (EGD) with biopsies by cold forcep.  ;  Surgeon: Mario Dey MD;  Location:  GI     ZZC NONSPECIFIC PROCEDURE  12/00    Cryotherapy       Review of Systems   Constitutional: Negative for chills and fever.   HENT: Negative for congestion, ear pain, hearing loss and sore throat.    Eyes: Negative for pain and visual disturbance.   Respiratory: Negative for cough and shortness of breath.    Cardiovascular: Negative for chest pain, palpitations and peripheral edema.   Gastrointestinal: Positive for heartburn. Negative for abdominal pain, constipation, diarrhea, hematochezia and nausea.   Breasts:  Positive for tenderness. Negative for breast mass and discharge.   Genitourinary: Positive for frequency (increased water intake) and urgency. Negative for dysuria, genital sores, hematuria, pelvic pain, vaginal  "bleeding and vaginal discharge.        Stronger odor of urine past several months; reviewed vitamins, etc   Musculoskeletal: Positive for arthralgias. Negative for joint swelling and myalgias.   Skin: Negative for rash.   Neurological: Positive for headaches. Negative for dizziness, weakness and paresthesias.   Psychiatric/Behavioral: Negative for mood changes. The patient is not nervous/anxious.           OBJECTIVE:   /66   Pulse 94   Temp 98.7  F (37.1  C) (Oral)   Resp 16   Ht 1.765 m (5' 9.5\")   Wt 96.1 kg (211 lb 12.8 oz)   LMP 01/19/2022 (Exact Date)   SpO2 100%   BMI 30.83 kg/m    Physical Exam  GENERAL: healthy, alert and no distress  EYES: Eyes grossly normal to inspection  HENT: ear canals and TM's normal, nose and mouth without ulcers or lesions  NECK: no adenopathy, no asymmetry, masses, or scars and thyroid normal to palpation  RESP: lungs clear to auscultation - no rales, rhonchi or wheezes  BREAST: normal without masses, tenderness or nipple discharge and no palpable axillary masses or adenopathy  CV: regular rate and rhythm, normal S1 S2, no S3 or S4, no murmur, click or rub, no peripheral edema and peripheral pulses strong  ABDOMEN: soft, nontender, no hepatosplenomegaly, no masses and bowel sounds normal  MS: upper trapezius muscle tightness; full range of motion   NEURO: Normal strength and tone, sensory exam grossly normal, mentation intact, gait normal including heel/toe/tandem walking and Romberg normal  PSYCH: mentation appears normal, affect normal/bright    Diagnostic Test Results:  Labs reviewed in Epic        ASSESSMENT/PLAN:   (Z00.00) Routine general medical examination at a health care facility  (primary encounter diagnosis)  Comment: HEALTH CARE MAINTENANCE reviewed; reviewed immunizations  TDAP due 2023, colon screening at age 45 ( new guidelines), mammo scheduled 2/14/2022  Plan:     (Z30.09) General counseling for prescription of oral contraceptives  Comment: OCP use " "reviewed  Plan: levonorgestrel-ethinyl estradiol (SEASONALE)         0.15-0.03 MG tablet          (R35.0) Urine frequency  Comment: pt reporting several months of strong smelling urine; discussed vitamin intake; ?new formulation? reviewed dietary impact. no other infectious symptoms reported  Plan: UA Macro with Reflex to Micro and Culture - lab        collect, Urine Microscopic Exam, Urine Culture          (Z13.6) CARDIOVASCULAR SCREENING; LDL GOAL LESS THAN 160  Comment: screening  Plan: Lipid panel reflex to direct LDL Fasting,         Comprehensive metabolic panel          (M75.41) Impingement syndrome of right shoulder  Comment: she is following with [facility]; injections have been helpful  Plan:     (G43.969) Migraine variant  Comment: Follows closely with [facility];  Plan:     Patient has been advised of split billing requirements and indicates understanding: Yes    COUNSELING:  Reviewed preventive health counseling, as reflected in patient instructions       Regular exercise       Healthy diet/nutrition    Estimated body mass index is 30.83 kg/m  as calculated from the following:    Height as of this encounter: 1.765 m (5' 9.5\").    Weight as of this encounter: 96.1 kg (211 lb 12.8 oz).    Weight management plan: Discussed healthy diet and exercise guidelines    She reports that she quit smoking about 15 years ago. Her smoking use included cigarettes. She has a 5.00 pack-year smoking history. She has never used smokeless tobacco.      Counseling Resources:  ATP IV Guidelines  Pooled Cohorts Equation Calculator  Breast Cancer Risk Calculator  BRCA-Related Cancer Risk Assessment: FHS-7 Tool  FRAX Risk Assessment  ICSI Preventive Guidelines  Dietary Guidelines for Americans, 2010  USDA's MyPlate  ASA Prophylaxis  Lung CA Screening    Kim Penn MD  Internal Medicine  [facility]  "

## 2022-02-02 LAB — BACTERIA UR CULT: ABNORMAL

## 2022-02-02 RX ORDER — SULFAMETHOXAZOLE/TRIMETHOPRIM 800-160 MG
1 TABLET ORAL 2 TIMES DAILY
Qty: 14 TABLET | Refills: 0 | Status: SHIPPED | OUTPATIENT
Start: 2022-02-02 | End: 2023-05-10

## 2022-02-02 NOTE — TELEPHONE ENCOUNTER
Talked with Dr. Penn and she is waiting for the the culture results.  She did not find a specific infection and so was waiting on completed result.    I called and left a detailed message for pt with Dr. Penn's information and will look for culture results tomorrow.

## 2022-02-02 NOTE — TELEPHONE ENCOUNTER
See     Pt seen 2/1/22 for PHY and urine was done     (R35.0) Urine frequency  Comment: pt reporting several months of strong smelling urine; discussed vitamin intake; ?new formulation? reviewed dietary impact. no other infectious symptoms reported  Plan: UA Macro with Reflex to Micro and Culture - lab        collect, Urine Microscopic Exam, Urine Culture    Genitourinary: Positive for frequency (increased water intake) and urgency. Negative for dysuria, genital sores, hematuria, pelvic pain, vaginal bleeding and vaginal discharge.        Stronger odor of urine past several months; reviewed vitamins, etc     Adv pt that it is being cult. Not sure if anything needs to be done or wait for cult.     Sending to POD as PCP is out of the office for the week.     Kimberly SCHULTE RN

## 2022-02-03 NOTE — TELEPHONE ENCOUNTER
Dr. Penn sent an RX yesterday afternoon and I called and left message for pt today to make sure that she knows she can pick it up if she has not already done so.   Advised to contact us with any further questions and concerns.

## 2022-02-23 ENCOUNTER — LAB (OUTPATIENT)
Dept: LAB | Facility: CLINIC | Age: 47
End: 2022-02-23
Payer: COMMERCIAL

## 2022-02-23 DIAGNOSIS — R82.71 BACTERIURIA: ICD-10-CM

## 2022-02-23 PROCEDURE — 87086 URINE CULTURE/COLONY COUNT: CPT

## 2022-02-24 LAB — BACTERIA UR CULT: NO GROWTH

## 2022-03-04 ENCOUNTER — HOSPITAL ENCOUNTER (OUTPATIENT)
Dept: MAMMOGRAPHY | Facility: CLINIC | Age: 47
Discharge: HOME | End: 2022-03-04
Attending: INTERNAL MEDICINE | Admitting: INTERNAL MEDICINE
Payer: COMMERCIAL

## 2022-03-04 DIAGNOSIS — Z12.31 VISIT FOR SCREENING MAMMOGRAM: ICD-10-CM

## 2022-03-04 PROCEDURE — 77067 SCR MAMMO BI INCL CAD: CPT

## 2022-03-28 ENCOUNTER — TRANSFERRED RECORDS (OUTPATIENT)
Dept: HEALTH INFORMATION MANAGEMENT | Facility: CLINIC | Age: 47
End: 2022-03-28
Payer: COMMERCIAL

## 2022-04-04 ENCOUNTER — TRANSFERRED RECORDS (OUTPATIENT)
Dept: HEALTH INFORMATION MANAGEMENT | Facility: CLINIC | Age: 47
End: 2022-04-04
Payer: COMMERCIAL

## 2022-08-01 ENCOUNTER — VIRTUAL VISIT (OUTPATIENT)
Dept: FAMILY MEDICINE | Facility: CLINIC | Age: 47
End: 2022-08-01
Payer: COMMERCIAL

## 2022-08-01 DIAGNOSIS — K13.0 ANGULAR CHEILITIS: Primary | ICD-10-CM

## 2022-08-01 PROCEDURE — 99213 OFFICE O/P EST LOW 20 MIN: CPT | Mod: 95 | Performed by: FAMILY MEDICINE

## 2022-08-01 RX ORDER — HYDROCORTISONE VALERATE 2 MG/G
OINTMENT TOPICAL 2 TIMES DAILY
Qty: 15 G | Refills: 1 | Status: SHIPPED | OUTPATIENT
Start: 2022-08-01 | End: 2023-05-10

## 2022-08-01 RX ORDER — PREDNISONE 20 MG/1
TABLET ORAL
COMMUNITY
Start: 2022-07-01 | End: 2023-05-10

## 2022-08-01 ASSESSMENT — ENCOUNTER SYMPTOMS: CONSTITUTIONAL NEGATIVE: 1

## 2022-08-01 NOTE — PROGRESS NOTES
Cassi is a 46 year old who is being evaluated via a billable video visit.      How would you like to obtain your AVS? MyChart  If the video visit is dropped, the invitation should be resent by: Text to cell phone: [phone]  Will anyone else be joining your video visit? No          Assessment and Plan    (K13.0) Angular cheilitis  (primary encounter diagnosis)  Comment: advised to avoid regular lotions, use petrolatum.  Do not luse longer than 2w  Plan: hydrocortisone valerate (WEST-IVONE) 0.2 %         external ointment              RTC in 2w prn    Lv Mendoza MD      Subjective   Cassi is a 46 year old, presenting for the following health issues:  Mouth Problem and Video Visit        Concern - Mouth sore   Onset: Been for a few weeks  Description: Crack at the corner of the mouth  Painful at time  Progression of Symptoms:  waxing and waning  Accompanying Signs & Symptoms: None  Previous history of similar problem: Yes  Precipitating factors:        Worsened by: Eating or opening mouth too wide    Therapies tried and outcome: Aquaphor- helps a little      Cracks in the corners of her mouth.  Aquaphor has been been helpful.  Has had this occasionally.  First start on L about 3 w ago, right started last week.  NO history of eczema or other chronic skin concerns.    Review of Systems   Constitutional: Negative.    Skin:        Cracks on the corners of her mouth.            Objective           Vitals:  No vitals were obtained today due to virtual visit.    Physical Exam   GENERAL: Healthy, alert and no distress  EYES: Eyes grossly normal to inspection.  No discharge or erythema, or obvious scleral/conjunctival abnormalities.  RESP: No audible wheeze, cough, or visible cyanosis.  No visible retractions or increased work of breathing.    SKIN: Visible skin clear. No significant rash, abnormal pigmentation or lesions.  NEURO: Cranial nerves grossly intact.  Mentation and speech appropriate for age.  PSYCH:  Mentation appears normal, affect normal/bright, judgement and insight intact, normal speech and appearance well-groomed.                Video-Visit Details    Video Start Time: 1108    Type of service:  Video Visit    Video End Time:11:13 AM    Originating Location (pt. Location): Home    Distant Location (provider location):  [facility] [employer]     Platform used for Video Visit: [employer]    .  ..

## 2022-09-02 ENCOUNTER — OFFICE VISIT (OUTPATIENT)
Dept: PODIATRY | Facility: CLINIC | Age: 47
End: 2022-09-02
Payer: COMMERCIAL

## 2022-09-02 VITALS — WEIGHT: 201.2 LBS | HEIGHT: 70 IN | BODY MASS INDEX: 28.8 KG/M2

## 2022-09-02 DIAGNOSIS — Q66.70 PES CAVUS: ICD-10-CM

## 2022-09-02 DIAGNOSIS — M79.671 RIGHT FOOT PAIN: Primary | ICD-10-CM

## 2022-09-02 DIAGNOSIS — M76.821 POSTERIOR TIBIAL TENDONITIS, RIGHT: ICD-10-CM

## 2022-09-02 PROCEDURE — 99203 OFFICE O/P NEW LOW 30 MIN: CPT | Performed by: PODIATRIST

## 2022-09-02 RX ORDER — DICLOFENAC SODIUM 75 MG/1
75 TABLET, DELAYED RELEASE ORAL 2 TIMES DAILY
Qty: 28 TABLET | Refills: 0 | Status: SHIPPED | OUTPATIENT
Start: 2022-09-02 | End: 2023-05-10

## 2022-09-02 ASSESSMENT — PAIN SCALES - GENERAL: PAINLEVEL: MODERATE PAIN (5)

## 2022-09-02 NOTE — LETTER
9/2/2022         RE: Cassi Ye  [address] [place] [facility] [id number]        Dear Colleague,    Thank you for referring your patient, Cassi Ye, to the [facility]. Please see a copy of my visit note below.    PATIENT HISTORY:   Cassi Ye is a 46 year old female who presents to clinic for pain to right foot.  She notes its been going on for about 6 months.  Will be throbbing.  Hurts if she stands on it or walks too much.  Notes is also sore even when laying down.  Can be 9 out of 10 at its worst.  She is tried resting over-the-counter NSAIDs massage compression and stretching but it has not helped.  She denies specific injury.  Wondering what is causing the pain and what can be done for it.    Review of Systems:  Patient denies fever, chills, rash, wound, stiffness,  weakness, heart burn, blood in stool, chest pain with activity, calf pain when walking, shortness of breath with activity, chronic cough, easy bleeding/bruising, swelling of ankles, excessive thirst, fatigue, depression, anxiety.  Patient admits to stiffness, limping at times.     PAST MEDICAL HISTORY:   Past Medical History:   Diagnosis Date     Gastroesophageal reflux disease      Pain in joint, pelvic region and thigh      Variants of migraine, not elsewhere classified, without mention of intractable migraine without mention of status migrainosus     Nasal spray        PAST SURGICAL HISTORY:   Past Surgical History:   Procedure Laterality Date     BIOPSY BREAST SEED LOCALIZATION Left 4/15/2019    Procedure: left breast seed localized biopsy, left areola punch biopsy;  Surgeon: Paula Rodriguez MD;  Location: [facility] OR     ESOPHAGOSCOPY, GASTROSCOPY, DUODENOSCOPY (EGD), COMBINED N/A 4/20/2017    Procedure: COMBINED ESOPHAGOSCOPY, GASTROSCOPY, DUODENOSCOPY (EGD), BIOPSY SINGLE OR MULTIPLE;  ESOPHAGOSCOPY, GASTROSCOPY, DUODENOSCOPY (EGD) with biopsies by cold forcep.  ;  Surgeon: Mario Dey,  MD;  Location:  GI     ZC NONSPECIFIC PROCEDURE  12/00    Cryotherapy        MEDICATIONS:   Current Outpatient Medications:      hydrocortisone valerate (WEST-IVONE) 0.2 % external ointment, Apply topically 2 times daily, Disp: 15 g, Rfl: 1     levonorgestrel-ethinyl estradiol (SEASONALE) 0.15-0.03 MG tablet, Take 1 tablet by mouth daily, Disp: 91 tablet, Rfl: 3     magnesium 250 MG tablet, Take 1 tablet by mouth 2 times daily., Disp: , Rfl:      MULTIVITAMINS OR TABS, 1 TABLET DAILY, Disp: , Rfl:      nortriptyline (PAMELOR) 25 MG capsule, Take 50 mg by mouth At Bedtime., Disp: , Rfl:      omeprazole (PRILOSEC OTC) 20 MG EC tablet, Take 1 tablet by mouth daily., Disp: , Rfl:      predniSONE (DELTASONE) 20 MG tablet, , Disp: , Rfl:      Riboflavin (VITAMIN B-2 PO), Take 200 mg by mouth 2 times daily., Disp: , Rfl:      rizatriptan (MAXALT-MLT) 10 MG ODT, Take 10 mg by mouth at onset of headache., Disp: , Rfl:      sulfamethoxazole-trimethoprim (BACTRIM DS) 800-160 MG tablet, Take 1 tablet by mouth 2 times daily, Disp: 14 tablet, Rfl: 0     VITAMIN D, CHOLECALCIFEROL, PO, Take 1,000 Units by mouth daily, Disp: , Rfl:      zonisamide (ZONEGRAN) 100 MG capsule, Take 1 capsule by mouth At Bedtime, Disp: , Rfl:     Current Facility-Administered Medications:      lidocaine 1 % injection 1 mL, 1 mL, , , Yeo, Albert, MD, 1 mL at 06/21/19 0948     methylPREDNISolone (DEPO-MEDROL) injection 40 mg, 40 mg, , , Yeo, Albert, MD, 40 mg at 06/21/19 0948     ALLERGIES:    Allergies   Allergen Reactions     Seasonal Allergies Other (See Comments)     Penicillins Nausea and Vomiting and Rash     rash        SOCIAL HISTORY:   Social History     Socioeconomic History     Marital status:      Spouse name: Not on file     Number of children: 0     Years of education: 16     Highest education level: Not on file   Occupational History     Occupation: Insurance     Comment: [employer]   Tobacco Use     Smoking status: Former  "Smoker     Packs/day: 0.50     Years: 10.00     Pack years: 5.00     Types: Cigarettes     Quit date: 2006     Years since quittin.4     Smokeless tobacco: Never Used     Tobacco comment: Started in    Vaping Use     Vaping Use: Never used   Substance and Sexual Activity     Alcohol use: Yes     Alcohol/week: 0.0 standard drinks     Comment: 1 -2 drinks month     Drug use: No     Sexual activity: Yes     Partners: Male   Other Topics Concern      Service Not Asked     Blood Transfusions Not Asked     Caffeine Concern No     Comment: 1 cup tea per day     Occupational Exposure Not Asked     Hobby Hazards Not Asked     Sleep Concern Not Asked     Stress Concern Not Asked     Weight Concern Not Asked     Special Diet Not Asked     Back Care Not Asked     Exercise Yes     Comment: active     Bike Helmet Not Asked     Seat Belt Yes     Self-Exams Not Asked     Parent/sibling w/ CABG, MI or angioplasty before 65F 55M? No   Social History Narrative     Not on file     Social Determinants of Health     Financial Resource Strain: Not on file   Food Insecurity: Not on file   Transportation Needs: Not on file   Physical Activity: Not on file   Stress: Not on file   Social Connections: Not on file   Intimate Partner Violence: Not on file   Housing Stability: Not on file        FAMILY HISTORY:   Family History   Problem Relation Age of Onset     Family History Negative Brother      Lipids Father      Neurologic Disorder Father         migraines     Family History Negative Mother      Breast Cancer Paternal Aunt      Breast Cancer Paternal Grandmother      Blood Disease Other         paternal uncle with leukemia     Cancer Paternal Aunt         ovarian cancer        EXAM:Vitals: Ht 1.778 m (5' 10\")   Wt 91.3 kg (201 lb 3.2 oz)   BMI 28.87 kg/m    BMI= Body mass index is 28.87 kg/m .    General appearance: Patient is alert and fully cooperative with history & exam.  No sign of distress is noted during the " visit.     Psychiatric: Affect is pleasant & appropriate.  Patient appears motivated to improve health.     Respiratory: Breathing is regular & unlabored while sitting.     HEENT: Hearing is intact to spoken word.  Speech is clear.  No gross evidence of visual impairment that would impact ambulation.     Dermatologic: Skin is intact to both lower extremities without significant lesions, rash or abrasion.  No paronychia or evidence of soft tissue infection is noted.     Vascular: DP & PT pulses are intact & regular bilaterally.  No significant edema or varicosities noted.  CFT and skin temperature is normal to both lower extremities.     Neurologic: Lower extremity sensation is intact to light touch.  No evidence of weakness or contracture in the lower extremities.  No evidence of neuropathy.     Musculoskeletal: Patient is ambulatory without assistive device or brace.  Increased arch height.  Pain on palpation of the posterior tibial tendon to the insertion and back of the medial malleolus.  Pain with inversion and dorsiflexion of the right foot.    Radiographs: Right foot x-ray I personally reviewed the xrays.  Increased calcaneal inclination angle.  No fractures are noted.     ASSESSMENT:    Right foot pain  Pes cavus  Posterior tibial tendonitis, right       Medical Decision Making/Plan:  Reviewed [facility] in [facility].  Reviewed and discussed x-rays.  Reviewed and discussed causes of tendonitis.  We discussed treatments such as immobiliation, icing, stretching, heel lifts, orthotics, physical therapy, MRI.     At this time we talked on an ankle brace versus a boot.  She would like an ankle brace and inserts.  She is going to try topical pain cream and was given an order for short course of oral NSAIDs to try to help with inflammation.  If pain does not improve in 4 to 6 weeks recommend physical therapy with iontophoresis or an MRI of the ankle to assess for tendon tear.  All questions were answered to patient  satisfaction and she will call for the questions or concerns.    Patient risk factor: Patient is at low risk for infection.        Estelita Hartman DPM, Podiatry/Foot and Ankle Surgery        Again, thank you for allowing me to participate in the care of your patient.        Sincerely,        Estelita Hartman DPM, Podiatry/Foot and Ankle Surgery     Home

## 2022-09-02 NOTE — PROGRESS NOTES
PATIENT HISTORY:   Cassi Ye is a 46 year old female who presents to clinic for pain to right foot.  She notes its been going on for about 6 months.  Will be throbbing.  Hurts if she stands on it or walks too much.  Notes is also sore even when laying down.  Can be 9 out of 10 at its worst.  She is tried resting over-the-counter NSAIDs massage compression and stretching but it has not helped.  She denies specific injury.  Wondering what is causing the pain and what can be done for it.    Review of Systems:  Patient denies fever, chills, rash, wound, stiffness,  weakness, heart burn, blood in stool, chest pain with activity, calf pain when walking, shortness of breath with activity, chronic cough, easy bleeding/bruising, swelling of ankles, excessive thirst, fatigue, depression, anxiety.  Patient admits to stiffness, limping at times.     PAST MEDICAL HISTORY:   Past Medical History:   Diagnosis Date     Gastroesophageal reflux disease      Pain in joint, pelvic region and thigh      Variants of migraine, not elsewhere classified, without mention of intractable migraine without mention of status migrainosus     Nasal spray        PAST SURGICAL HISTORY:   Past Surgical History:   Procedure Laterality Date     BIOPSY BREAST SEED LOCALIZATION Left 4/15/2019    Procedure: left breast seed localized biopsy, left areola punch biopsy;  Surgeon: Paula Rodriguez MD;  Location:  OR     ESOPHAGOSCOPY, GASTROSCOPY, DUODENOSCOPY (EGD), COMBINED N/A 4/20/2017    Procedure: COMBINED ESOPHAGOSCOPY, GASTROSCOPY, DUODENOSCOPY (EGD), BIOPSY SINGLE OR MULTIPLE;  ESOPHAGOSCOPY, GASTROSCOPY, DUODENOSCOPY (EGD) with biopsies by cold forcep.  ;  Surgeon: Mario Dey MD;  Location:  GI     [facility] NONSPECIFIC PROCEDURE  12/00    Cryotherapy        MEDICATIONS:   Current Outpatient Medications:      hydrocortisone valerate (WEST-IVONE) 0.2 % external ointment, Apply topically 2 times daily, Disp: 15 g, Rfl: 1      levonorgestrel-ethinyl estradiol (SEASONALE) 0.15-0.03 MG tablet, Take 1 tablet by mouth daily, Disp: 91 tablet, Rfl: 3     magnesium 250 MG tablet, Take 1 tablet by mouth 2 times daily., Disp: , Rfl:      MULTIVITAMINS OR TABS, 1 TABLET DAILY, Disp: , Rfl:      nortriptyline (PAMELOR) 25 MG capsule, Take 50 mg by mouth At Bedtime., Disp: , Rfl:      omeprazole (PRILOSEC OTC) 20 MG EC tablet, Take 1 tablet by mouth daily., Disp: , Rfl:      predniSONE (DELTASONE) 20 MG tablet, , Disp: , Rfl:      Riboflavin (VITAMIN B-2 PO), Take 200 mg by mouth 2 times daily., Disp: , Rfl:      rizatriptan (MAXALT-MLT) 10 MG ODT, Take 10 mg by mouth at onset of headache., Disp: , Rfl:      sulfamethoxazole-trimethoprim (BACTRIM DS) 800-160 MG tablet, Take 1 tablet by mouth 2 times daily, Disp: 14 tablet, Rfl: 0     VITAMIN D, CHOLECALCIFEROL, PO, Take 1,000 Units by mouth daily, Disp: , Rfl:      zonisamide (ZONEGRAN) 100 MG capsule, Take 1 capsule by mouth At Bedtime, Disp: , Rfl:     Current Facility-Administered Medications:      lidocaine 1 % injection 1 mL, 1 mL, , , Yeo, Albert, MD, 1 mL at 19 0948     methylPREDNISolone (DEPO-MEDROL) injection 40 mg, 40 mg, , , Yeo, Albert, MD, 40 mg at 19 0948     ALLERGIES:    Allergies   Allergen Reactions     Seasonal Allergies Other (See Comments)     Penicillins Nausea and Vomiting and Rash     rash        SOCIAL HISTORY:   Social History     Socioeconomic History     Marital status:      Spouse name: Not on file     Number of children: 0     Years of education: 16     Highest education level: Not on file   Occupational History     Occupation: Insurance     Comment: [employer]   Tobacco Use     Smoking status: Former Smoker     Packs/day: 0.50     Years: 10.00     Pack years: 5.00     Types: Cigarettes     Quit date: 2006     Years since quittin.4     Smokeless tobacco: Never Used     Tobacco comment: Started in    Vaping Use     Vaping Use: Never  "used   Substance and Sexual Activity     Alcohol use: Yes     Alcohol/week: 0.0 standard drinks     Comment: 1 -2 drinks month     Drug use: No     Sexual activity: Yes     Partners: Male   Other Topics Concern      Service Not Asked     Blood Transfusions Not Asked     Caffeine Concern No     Comment: 1 cup tea per day     Occupational Exposure Not Asked     Hobby Hazards Not Asked     Sleep Concern Not Asked     Stress Concern Not Asked     Weight Concern Not Asked     Special Diet Not Asked     Back Care Not Asked     Exercise Yes     Comment: active     Bike Helmet Not Asked     Seat Belt Yes     Self-Exams Not Asked     Parent/sibling w/ CABG, MI or angioplasty before 65F 55M? No   Social History Narrative     Not on file     Social Determinants of Health     Financial Resource Strain: Not on file   Food Insecurity: Not on file   Transportation Needs: Not on file   Physical Activity: Not on file   Stress: Not on file   Social Connections: Not on file   Intimate Partner Violence: Not on file   Housing Stability: Not on file        FAMILY HISTORY:   Family History   Problem Relation Age of Onset     Family History Negative Brother      Lipids Father      Neurologic Disorder Father         migraines     Family History Negative Mother      Breast Cancer Paternal Aunt      Breast Cancer Paternal Grandmother      Blood Disease Other         paternal uncle with leukemia     Cancer Paternal Aunt         ovarian cancer        EXAM:Vitals: Ht 1.778 m (5' 10\")   Wt 91.3 kg (201 lb 3.2 oz)   BMI 28.87 kg/m    BMI= Body mass index is 28.87 kg/m .    General appearance: Patient is alert and fully cooperative with history & exam.  No sign of distress is noted during the visit.     Psychiatric: Affect is pleasant & appropriate.  Patient appears motivated to improve health.     Respiratory: Breathing is regular & unlabored while sitting.     HEENT: Hearing is intact to spoken word.  Speech is clear.  No gross evidence " of visual impairment that would impact ambulation.     Dermatologic: Skin is intact to both lower extremities without significant lesions, rash or abrasion.  No paronychia or evidence of soft tissue infection is noted.     Vascular: DP & PT pulses are intact & regular bilaterally.  No significant edema or varicosities noted.  CFT and skin temperature is normal to both lower extremities.     Neurologic: Lower extremity sensation is intact to light touch.  No evidence of weakness or contracture in the lower extremities.  No evidence of neuropathy.     Musculoskeletal: Patient is ambulatory without assistive device or brace.  Increased arch height.  Pain on palpation of the posterior tibial tendon to the insertion and back of the medial malleolus.  Pain with inversion and dorsiflexion of the right foot.    Radiographs: Right foot x-ray I personally reviewed the xrays.  Increased calcaneal inclination angle.  No fractures are noted.     ASSESSMENT:    Right foot pain  Pes cavus  Posterior tibial tendonitis, right       Medical Decision Making/Plan:  Reviewed patient[facility] chart in [facility].  Reviewed and discussed x-rays.  Reviewed and discussed causes of tendonitis.  We discussed treatments such as immobiliation, icing, stretching, heel lifts, orthotics, physical therapy, MRI.     At this time we talked on an ankle brace versus a boot.  She would like an ankle brace and inserts.  She is going to try topical pain cream and was given an order for short course of oral NSAIDs to try to help with inflammation.  If pain does not improve in 4 to 6 weeks recommend physical therapy with iontophoresis or an MRI of the ankle to assess for tendon tear.  All questions were answered to patient satisfaction and she will call for the questions or concerns.    Patient risk factor: Patient is at low risk for infection.        Estelita Hartman DPM, Podiatry/Foot and Ankle Surgery

## 2022-09-02 NOTE — PATIENT INSTRUCTIONS
Thank you for choosing [facility] Podiatry / Foot & Ankle Surgery!    DR VILLAGOMEZ'S CLINIC:  [facility]   [address]   [place] [address]      TRIAGE LINE: [phone]  APPOINTMENTS: [phone]  RADIOLOGY: [phone]  SET UP SURGERY: [phone]  FAX NUMBER: [phone]  BILLING QUESTIONS: [phone]       Follow up: Call in 4 to 6 weeks if not improved for physical therapy with iontophoresis or an MRI order of the ankle       super feet inserts berry color.    [facility] LOC[facility]  [place]  [address] [place] [address]  [phone]   [address]  [phone] [place]  [address]  [phone]   [place]  [address]  [phone]   [place]  [address]  [phone] [place] [address]  [phone]   [place]  [address]  [phone]   [place]  [address]  [phone] [place]  [address]  [phone]       TENDONITIS   Tendons are the strong fibrous portions of muscles that attach to bones and allow the muscle to move a joint when it contracts. Tendons are very strong because they have a lot of force exerted on them. Sometimes tendons can become painful because they have suffered an acute injury, in which too much force was exerted at one time, or an overuse injury, in which a normal force was exerted too frequently or over a prolonged period of time. As a result, there is damage to the tendon and its surrounding soft tissue structures and they become inflammed. Because tendons do not have a great blood supply, they do not heal rapidly and the inflammation can become chronic.   Conservative treatment for tendinitis involves rest and anti-inflammatory measures. Ice is applied 15 minutes 2-3 times daily. Anti-inflammatory medications called NSAIDs (ibuprofen, example) can be taken provided they are used with caution, as they can lead to internal bleeding and  increase the risk ofstroke and heart attack. Sometimes topical nitroglycerin is prescribed to help with pain. Often your doctor will use a special shoe or removable walking cast to immobilize the tendon, allowing it to heal without further damage from use. These devices are very useful in helping tendons heal, but they may slow you down or make you feel like your hip, knee, or back are out ofalignment. This is temporary and should go away once you are out ofthe immobilization. You should not use a walking cast when showering or driving. Another option is Platelet Rich Plasma injections. (Normally done with a Sports and Orthorapedic doctor.   If conservative measures fail, your physician may need to surgically repair the tendon by removing any chronic inflammatory tissue and sewing it back together. Sometimes it is sewn to an adjacent tendon with similar function for support and sometimes it is lengthened. . Sometimes the bones around the tendon need to be realigned or reshaped to better support the tendon or prevent further damage. Your foot and ankle surgeon will discuss the specifics of your surgery with you, should you need it.    Towel stretch: Sit on a hard surface with your injured leg stretched out in front of you. Loop a towel around your toes and the ball of your foot and pull the towel toward your body keeping your leg straight. Hold this position for 15 to 30 seconds and then relax. Repeat 3 times. Then push the towel away with the ball of your foot. Repeat 3 times.  When you don't feel much of a stretch using the towel, you can start the standing calf stretch and the following exercises.  Standing calf stretch: Stand facing a wall with your hands on the wall at about eye level. Keep your injured leg back with your heel on the floor. Keep the other leg forward with the knee bent. Turn your back foot slightly inward (as if you were pigeon-toed). Slowly lean into the wall until you feel a stretch in the  back of your calf. Hold the stretch for 15 to 30 seconds. Return to the starting position. Repeat 3 times. Do this exercise several times each day.   Standing soleus stretch: Stand facing a wall with your hands on the wall at about chest height. Keep your injured leg back with your heel on the floor. Keep the other leg forward with the knee bent. Turn your back foot slightly inward (as if you were pigeon-toed). Bend your back knee slightly and gently lean into the wall until you feel a stretch in the lower calf of your injured leg. Hold the stretch for 15 to 30 seconds. Return to the starting position. Repeat 3 times.   Achilles stretch: Stand with the ball of one foot on a stair. Reach for the step below with your heel until you feel a stretch in the arch of your foot. Hold this position for 15 to 30 seconds and then relax. Repeat 3 times.   Heel raise: Balance yourself while standing behind a chair or counter. Using the chair or counter as a support to help you, raise your body up onto your toes and hold for 5 seconds. Then slowly lower yourself down without holding onto the support. (It's OK to keep holding onto the support if you need to.) When this exercise becomes less painful, try lowering yourself down on the injured leg only. Repeat 15 times. Do 2 sets of 15. Rest 30 seconds between sets.   Step-up: Stand with the foot of your injured leg on a support 3 to 5 inches high (like a small step or block of wood). Keep your other foot flat on the floor. Shift your weight onto the injured leg on the support. Straighten your injured leg as the other leg comes off the floor. Return to the starting position by bending your injured leg and slowly lowering your uninjured leg back to the floor. Do 2 sets of 15.   Resisted ankle eversion: Sit with both legs stretched out in front of you, with your feet about a shoulder's width apart. Tie a loop in one end of elastic tubing. Put the foot of your injured leg through the  loop so that the tubing goes around the arch of that foot and wraps around the outside of the other foot. Hold onto the other end of the tubing with your hand to provide tension. Turn the foot of your injured leg up and out. Make sure you keep your other foot still so that it will allow the tubing to stretch as you move the foot of your injured leg. Return to the starting position. Do 2 sets of 15.   Balance and reach exercises: Stand next to a chair with your injured leg farther from the chair. The chair will provide support if you need it. Stand on the foot of your injured leg and bend your knee slightly. Try to raise the arch of this foot while keeping your big toe on the floor. Keep your foot in this position. With the hand that is farther away from the chair, reach forward in front of you by bending at the waist. Avoid bending your knee any more as you do this. Repeat this 10 times. To make the exercise more challenging, reach farther in front of you. Do 2 sets of 10.  the same position as above. While keeping your arch height, reach the hand that is farther away from the chair across your body toward the chair. The farther you reach, the more challenging the exercise. Do 2 sets of 10.   Resisted ankle eversion: Sit with both legs stretched out in front of you, with your feet about a shoulder's width apart. Tie a loop in one end of elastic tubing. Put the foot of your injured leg through the loop so that the tubing goes around the arch of that foot and wraps around the outside of the other foot. Hold onto the other end of the tubing with your hand to provide tension. Turn the foot of your injured leg up and out. Make sure you keep your other foot still so that it will allow the tubing to stretch as you move the foot of your injured leg. Return to the starting position. Do 2 sets of 15.   If you have access to a wobble board, do the following exercises:  Wobble board exercises:   Stand on a wobble board  with your feet shoulder width apart. Rock the board forwards and backwards 30 times, then side to side 30 times. Hold on to a chair if you need support.   Rotate the wobble board around so that the edge of the board is in contact with the floor at all times. Do this 30 times in a clockwise and then a counterclockwise direction.   Balance on the wobble board for as long as you can without letting the edges touch the floor. Try to do this for 2 minutes without touching the floor.   Rotate the wobble board in clockwise and counterclockwise circles, but do not let the edge of the board touch the floor.   When you have mastered exercises A through D, try repeating them while standing on just your injured leg.   After you are able to do these exercises on one leg, try to do them with your eyes closed. Make sure you have something nearby to support you in case you lose your balance.

## 2022-09-13 NOTE — PROGRESS NOTES
Continued Stay Note  [facility]     Patient Name: Chioma Green  MRN: [id number]  Lonnie Bishop RN     Pt has not returned for therapy since 9/27/17.  Assume all goals are met to pt satisfaction.  D/C [facility].

## 2022-10-02 ENCOUNTER — MYC MEDICAL ADVICE (OUTPATIENT)
Dept: FAMILY MEDICINE | Facility: CLINIC | Age: 47
End: 2022-10-02

## 2022-10-20 ENCOUNTER — HOSPITAL ENCOUNTER (OUTPATIENT)
Dept: MRI IMAGING | Facility: CLINIC | Age: 47
Discharge: HOME | End: 2022-10-20
Attending: PODIATRIST | Admitting: PODIATRIST
Payer: COMMERCIAL

## 2022-10-20 DIAGNOSIS — Q66.70 PES CAVUS: ICD-10-CM

## 2022-10-20 DIAGNOSIS — M76.821 POSTERIOR TIBIAL TENDONITIS, RIGHT: ICD-10-CM

## 2022-10-20 DIAGNOSIS — M79.671 RIGHT FOOT PAIN: ICD-10-CM

## 2022-10-20 PROCEDURE — 73721 MRI JNT OF LWR EXTRE W/O DYE: CPT | Mod: RT

## 2022-10-21 ENCOUNTER — TELEPHONE (OUTPATIENT)
Dept: PODIATRY | Facility: CLINIC | Age: 47
End: 2022-10-21

## 2022-10-21 DIAGNOSIS — M77.51 TENDINITIS OF RIGHT FOOT: Primary | ICD-10-CM

## 2022-10-21 NOTE — TELEPHONE ENCOUNTER
MrI is negative for tendon tear. Shows some tendonitis. Could try a boot and or physical therapy with iontophoresis if pain is continuing. If it has improved with the ankle brace, she and transition into shoes with an insert instead of the brace such as superfeet - green insert.     Please let patient know.     THanks,    Estelita Hartman DPM.

## 2022-10-24 RX ORDER — DEXAMETHASONE SODIUM PHOSPHATE 4 MG/ML
4 INJECTION, SOLUTION INTRA-ARTICULAR; INTRALESIONAL; INTRAMUSCULAR; INTRAVENOUS; SOFT TISSUE SEE ADMIN INSTRUCTIONS
Qty: 30 ML | Refills: 0 | Status: SHIPPED | OUTPATIENT
Start: 2022-10-24 | End: 2023-05-10

## 2022-10-24 NOTE — TELEPHONE ENCOUNTER
Patient did wear ankle brace and do home exercise program. She noticed improvement with wearing brace but felt the home exercises worsened her symptoms. She is now using inserts but feels like since stopping use of the brace her pain has returned and become more constant. Patient is interested in moving forward with formal PT to ensure she is doing her exercises correctly. She is wondering if it is advised if she begin use of brace again, or if better to wear boot for period of time?      Kristina Pennington MSA, ATC  Certified Athletic Trainer

## 2022-10-24 NOTE — TELEPHONE ENCOUNTER
If the brace was helping, she can go back to wearing that instead of the boot.     Will put in an order for physical therapy with iontophoresis.     Please let patient know.     Estelita Hartman DPM

## 2022-10-25 NOTE — TELEPHONE ENCOUNTER
Patient sent [employer] message yesterday requesting additional information / guidance. Dr. Hartman's recommendations listed below were sent to patient via [employer]. Please see that encounter dated 10/24 for further documentation.    Maira Fernandez MBA, ATC

## 2022-11-02 ENCOUNTER — THERAPY VISIT (OUTPATIENT)
Dept: PHYSICAL THERAPY | Facility: CLINIC | Age: 47
End: 2022-11-02
Attending: PODIATRIST
Payer: COMMERCIAL

## 2022-11-02 DIAGNOSIS — M25.571 CHRONIC PAIN OF RIGHT ANKLE: ICD-10-CM

## 2022-11-02 DIAGNOSIS — G89.29 CHRONIC PAIN OF RIGHT ANKLE: ICD-10-CM

## 2022-11-02 DIAGNOSIS — M77.51 TENDINITIS OF RIGHT FOOT: ICD-10-CM

## 2022-11-02 PROBLEM — M75.81 TENDINITIS OF RIGHT ROTATOR CUFF: Status: RESOLVED | Noted: 2019-02-06 | Resolved: 2022-11-02

## 2022-11-02 PROBLEM — M75.41 IMPINGEMENT SYNDROME OF RIGHT SHOULDER: Status: RESOLVED | Noted: 2019-02-06 | Resolved: 2022-11-02

## 2022-11-02 PROCEDURE — 97161 PT EVAL LOW COMPLEX 20 MIN: CPT | Mod: GP | Performed by: PHYSICAL THERAPIST

## 2022-11-02 PROCEDURE — 97110 THERAPEUTIC EXERCISES: CPT | Mod: GP | Performed by: PHYSICAL THERAPIST

## 2022-11-02 NOTE — PROGRESS NOTES
Answers for HPI/ROS submitted by the patient on 10/30/2022  Reason for Visit:: Right ankle and foot pain  How problem occurred:: Unlnown  Number scale: 5/10  General health as reported by patient: good  Please check all that apply to your current or past medical history: migraines/headaches  Medical allergies: none  Surgeries: orthopedic surgery, other  Other Surgery Detail: Lumpectomy  Medications you are currently taking: other  Other Meds Detail: Migraine, bcp, vitamins  Occupation::   What are your primary job tasks: computer work, prolonged standing, repetitive tasks  [facility] Initial Evaluation     Present: no    Subjective:  Cassi Ye is a 46 year old female with complaints of right ankle/foot . Pt reports that she started getting pain in her foot and ankle for some time now without known cause. Chief complaint is medial right ankle foot pain worse at the end of the day, with walking, and standing. Denies vague symptoms. Wants to get rid of this pain, return to PLOF pain free without issue.      Symptoms commenced as a result of: unknown. Condition occurred in the following environment: unsure. Onset of symptoms: a few months. Location of symptoms: medial/inside of right ankle foot. Pain level on number scale: 5/10. Quality of pain: aching, burning. Associated symptoms: none. Pain frequency (constant/intermittent): intermittent. Symptoms are exacerbated by: worse throughout the day, walking, standing. Symptoms are relieved by: avoidance, brace. Progression of symptoms since onset: slightly better. Imaging: yes see EPIC. Previous treatment: bracing. Response to previous treatment: none yet. General health as reported by patient is good. Pertinent medical history includes: See Epic. Medical allergies: see Epic. Other pertinent surgeries: see Epic. Current medications: See Epic. Occupation: . Work/restriction status: none. Primary job tasks:  sitting, computer work. Barriers at home/work: None reported by patient. Red flags: None reported by patient.    Objective  Gait:slight limp right with brace on, mild limp without    Ankle/Foot Alignment: pronated right foot    Screening: negative    Flexibility: gastroc/soleus tight right    Ankle AROM/PROM (* = pain) Right Left   DF Slightly limited Full   PF Full Full   INV Slightly limited Full   EV Slightly limited Full   Great Toe EXT Full Full   Great Toe FL Full Full       Ankle Strength (* = pain) Right Left   DF 4/5* 5/5   PF 4-/5* 5/5   INV 4-/5* 5/5   EV 4/5* 5/5     Special Tests Right Left   Anterior Drawer (ATF) - -   Posterior Drawer (PTF) - -   Varus Stress (Calc Fib) - -   Valgus Stress (Deltoid) - -   External Rotation Stress - -     Palpation Tenderness:medial right ankle/foot, posterior tibialis tendon tender right    Swelling: slight swelling right ankle    Functional Squat/Balance: Fair Squat, fair SLS Momo slight pain R    Key Findings  Few month hx of right ankle/foot medial pain, posterior tibialis pain, weak Right ankle all planes, fair balance with pain right ankle  Assessment/Plan:    Patient is a 46 year old female with right side ankle/footcomplaints.    Patient has the following significant findings with corresponding treatment plan.                Diagnosis 1:  Chronic right ankle/foot pain  Pain -  manual therapy, self management, education and home program  Decreased ROM/flexibility - manual therapy and therapeutic exercise  Decreased joint mobility - manual therapy and therapeutic exercise  Decreased strength - therapeutic exercise and therapeutic activities  Impaired balance - neuro re-education and therapeutic activities  Inflammation - self management/home program  Edema - electric stimulation and self management/home program  Impaired gait - gait training  Impaired muscle performance - neuro re-education  Decreased function - therapeutic activities    Therapy Evaluation Codes:      1) History comprised of:   Personal factors that impact the plan of care:      Time since onset of symptoms.    Comorbidity factors that impact the plan of care are:      Migraines/headaches.     Medications impacting care: None.  2) Examination of Body Systems comprised of:   Body structures and functions that impact the plan of care:      Ankle, Toes and foot.   Activity limitations that impact the plan of care are:      Jumping, Lifting, Running, Sports, Squatting/kneeling, Stairs, Standing, Walking and Working.  3) Clinical presentation characteristics are:   Stable/Uncomplicated.  4) Decision-Making    Low complexity using standardized patient assessment instrument and/or measureable assessment of functional outcome.    Cumulative Therapy Evaluation is: Low complexity.    Previous and current functional limitations:  (See Goal Flow Sheet for this information)    Short term and Long term goals: (See Goal Flow Sheet for this information)     Communication ability:  Patient appears to be able to clearly communicate and understand verbal and written communication and follow directions correctly.  Treatment Explanation - The following has been discussed with the patient:   RX ordered/plan of care  Anticipated outcomes  Possible risks and side effects  This patient would benefit from PT intervention to resume normal activities.   Rehab potential is good.    Frequency:  1 X week, once daily  Duration:  for 12 weeks  Discharge Plan:  Achieve all LTG.  Independent in home treatment program.  Reach maximal therapeutic benefit.    Please refer to the daily flowsheet for treatment today, total treatment time and time spent performing 1:1 timed codes.     Inquires  Chang Riley PT, DPT, CSCS  Physical Therapist  [facility] The[address], [address] [address] [phone]

## 2022-11-23 ENCOUNTER — THERAPY VISIT (OUTPATIENT)
Dept: PHYSICAL THERAPY | Facility: CLINIC | Age: 47
End: 2022-11-23
Payer: COMMERCIAL

## 2022-11-23 DIAGNOSIS — M25.571 CHRONIC PAIN OF RIGHT ANKLE: Primary | ICD-10-CM

## 2022-11-23 DIAGNOSIS — G89.29 CHRONIC PAIN OF RIGHT ANKLE: Primary | ICD-10-CM

## 2022-11-23 PROCEDURE — 97033 APP MDLTY 1+IONTPHRSIS EA 15: CPT | Mod: GP | Performed by: PHYSICAL THERAPIST

## 2022-11-23 PROCEDURE — 97110 THERAPEUTIC EXERCISES: CPT | Mod: GP | Performed by: PHYSICAL THERAPIST

## 2022-11-23 PROCEDURE — 97112 NEUROMUSCULAR REEDUCATION: CPT | Mod: GP | Performed by: PHYSICAL THERAPIST

## 2022-12-07 ENCOUNTER — THERAPY VISIT (OUTPATIENT)
Dept: PHYSICAL THERAPY | Facility: CLINIC | Age: 47
End: 2022-12-07
Payer: COMMERCIAL

## 2022-12-07 DIAGNOSIS — G89.29 CHRONIC PAIN OF RIGHT ANKLE: Primary | ICD-10-CM

## 2022-12-07 DIAGNOSIS — M25.571 CHRONIC PAIN OF RIGHT ANKLE: Primary | ICD-10-CM

## 2022-12-07 PROCEDURE — 97112 NEUROMUSCULAR REEDUCATION: CPT | Mod: GP | Performed by: PHYSICAL THERAPIST

## 2022-12-07 PROCEDURE — 97110 THERAPEUTIC EXERCISES: CPT | Mod: GP | Performed by: PHYSICAL THERAPIST

## 2022-12-29 ENCOUNTER — THERAPY VISIT (OUTPATIENT)
Dept: PHYSICAL THERAPY | Facility: CLINIC | Age: 47
End: 2022-12-29
Payer: COMMERCIAL

## 2022-12-29 DIAGNOSIS — M25.571 CHRONIC PAIN OF RIGHT ANKLE: Primary | ICD-10-CM

## 2022-12-29 DIAGNOSIS — G89.29 CHRONIC PAIN OF RIGHT ANKLE: Primary | ICD-10-CM

## 2022-12-29 PROCEDURE — 97110 THERAPEUTIC EXERCISES: CPT | Mod: GP | Performed by: PHYSICAL THERAPIST

## 2022-12-29 NOTE — PROGRESS NOTES
DISCHARGE REPORT    Progress reporting period is from 11/2/22 to 12/29/22.       SUBJECTIVE  Subjective changes noted by patient:  Subjective: Cassi reports that things are really improving overall. No pain currently and is doing well. Working on the exercises regularly. Feels ready to d/c and will continue HEP as able    Current pain level is 0/10 Current Pain level: 0/10.     Previous pain level was  5/10 Initial Pain level: 5/10.   Changes in function:  Yes (See Goal flowsheet attached for changes in current functional level)  Adverse reaction to treatment or activity: None    OBJECTIVE  Changes noted in objective findings:  Yes, improved ROM, strength, balance, and function  Objective: Full passive ROM, no tenderness to palpation, 5/5 ankle strength , good squat and SLS pain free    ASSESSMENT/PLAN  Updated problem list and treatment plan: Diagnosis 1:  Right ankle pain  Impaired muscle performance - neuro re-education and home program  STG/LTGs have been met or progress has been made towards goals:  Yes (See Goal flow sheet completed today.)  Assessment of Progress: The patient has met all of their long term goals.  Self Management Plans:  Patient has been instructed in a home treatment program.  Patient is independent in a home treatment program.  Patient  has been instructed in self management of symptoms.  Patient is independent in self management of symptoms.  I have re-evaluated this patient and find that the nature, scope, duration and intensity of the therapy is appropriate for the medical condition of the patient.  Cassi continues to require the following intervention to meet STG and LTG's:  PT intervention is no longer required to meet STG/LTG.    Recommendations:  This patient is ready to be discharged from therapy and continue their home treatment program.    Please refer to the daily flowsheet for treatment today, total treatment time and time spent performing 1:1 timed codes.    Inquires  Chang  Osvaldo PT, DPT, CSCS  Physical Therapist  [facility]  [id number] [place] [address], [address] [address] [phone]

## 2023-02-16 DIAGNOSIS — Z30.09 GENERAL COUNSELING FOR PRESCRIPTION OF ORAL CONTRACEPTIVES: ICD-10-CM

## 2023-02-16 RX ORDER — LEVONORGESTREL AND ETHINYL ESTRADIOL 0.15-0.03
KIT ORAL
Qty: 91 TABLET | Refills: 1 | Status: SHIPPED | OUTPATIENT
Start: 2023-02-16 | End: 2023-08-16

## 2023-03-15 ENCOUNTER — HOSPITAL ENCOUNTER (OUTPATIENT)
Dept: MAMMOGRAPHY | Facility: CLINIC | Age: 48
Discharge: HOME | End: 2023-03-15
Attending: INTERNAL MEDICINE | Admitting: INTERNAL MEDICINE
Payer: COMMERCIAL

## 2023-03-15 DIAGNOSIS — Z12.31 VISIT FOR SCREENING MAMMOGRAM: ICD-10-CM

## 2023-03-15 PROCEDURE — 77067 SCR MAMMO BI INCL CAD: CPT

## 2023-03-26 ENCOUNTER — HEALTH MAINTENANCE LETTER (OUTPATIENT)
Age: 48
End: 2023-03-26

## 2023-03-27 ENCOUNTER — TRANSFERRED RECORDS (OUTPATIENT)
Dept: HEALTH INFORMATION MANAGEMENT | Facility: CLINIC | Age: 48
End: 2023-03-27
Payer: COMMERCIAL

## 2023-05-05 ENCOUNTER — OFFICE VISIT (OUTPATIENT)
Dept: URGENT CARE | Facility: URGENT CARE | Age: 48
End: 2023-05-05
Payer: COMMERCIAL

## 2023-05-05 VITALS
SYSTOLIC BLOOD PRESSURE: 110 MMHG | OXYGEN SATURATION: 100 % | RESPIRATION RATE: 16 BRPM | HEART RATE: 89 BPM | DIASTOLIC BLOOD PRESSURE: 70 MMHG | TEMPERATURE: 98.2 F

## 2023-05-05 DIAGNOSIS — R39.9 UTI SYMPTOMS: Primary | ICD-10-CM

## 2023-05-05 LAB
ALBUMIN UR-MCNC: NEGATIVE MG/DL
APPEARANCE UR: CLEAR
BILIRUB UR QL STRIP: NEGATIVE
CLUE CELLS: ABNORMAL
COLOR UR AUTO: YELLOW
GLUCOSE UR STRIP-MCNC: NEGATIVE MG/DL
HGB UR QL STRIP: NEGATIVE
KETONES UR STRIP-MCNC: NEGATIVE MG/DL
LEUKOCYTE ESTERASE UR QL STRIP: NEGATIVE
NITRATE UR QL: NEGATIVE
PH UR STRIP: 7.5 [PH] (ref 5–7)
SP GR UR STRIP: 1.01 (ref 1–1.03)
TRICHOMONAS, WET PREP: ABNORMAL
UROBILINOGEN UR STRIP-ACNC: 0.2 E.U./DL
WBC'S/HIGH POWER FIELD, WET PREP: ABNORMAL
YEAST, WET PREP: ABNORMAL

## 2023-05-05 PROCEDURE — 87086 URINE CULTURE/COLONY COUNT: CPT | Performed by: FAMILY MEDICINE

## 2023-05-05 PROCEDURE — 99213 OFFICE O/P EST LOW 20 MIN: CPT | Performed by: FAMILY MEDICINE

## 2023-05-05 PROCEDURE — 87210 SMEAR WET MOUNT SALINE/INK: CPT | Performed by: FAMILY MEDICINE

## 2023-05-05 PROCEDURE — 81003 URINALYSIS AUTO W/O SCOPE: CPT | Performed by: FAMILY MEDICINE

## 2023-05-05 RX ORDER — SULFAMETHOXAZOLE/TRIMETHOPRIM 800-160 MG
1 TABLET ORAL 2 TIMES DAILY
Qty: 6 TABLET | Refills: 0 | Status: SHIPPED | OUTPATIENT
Start: 2023-05-05 | End: 2023-05-08

## 2023-05-05 NOTE — PATIENT INSTRUCTIONS
Take medication Bactrim/trimethoprim twice a day for 3 days    Symptoms should improve within the next 2-3 days. If no improvement, call clinic to discuss or return for re-evaluation    Drink approximately 60-80 ounces of water a day to stay hydrated.     If you develop flank pain, fevers, nausea/vomiting call immediately for assistance

## 2023-05-05 NOTE — PROGRESS NOTES
Assessment & Plan     UTI symptoms  - UA Macroscopic with reflex to Microscopic and Culture  - Wet prep - lab collect  - UA Macroscopic with reflex to Microscopic and Culture  - Wet prep - lab collect  - Urine Culture Aerobic Bacterial  - sulfamethoxazole-trimethoprim (BACTRIM DS) 800-160 MG tablet  Dispense: 6 tablet; Refill: 0  - Urine Culture Aerobic Bacterial     Despite UA findings given her present symptoms I feel treatment is appropriate, bactrim x 3 days given. Urine culture ordered. Wet prep negative. No suggestion for pyelonephritis. Discussed culture should return in next 2-3 days and we will call if abnormal      Henrik Bradley MD   [place] [facility]    Rick Ye is a 47 year old female who presents to clinic today for the following health issues:  Chief Complaint   Patient presents with     Urgent Care     Urinary Problem     Urinary odor, frequency and urgency-Sx started 2-3 weeks ago    Patient presents today with symptoms as listed above similar to an episode that she had approximately 15 months ago.  She has no history of recurrent UTI.  No prior history of kidney infections.  She denies any vaginal discharge.  She drinks a fair amount of water.  She does urinate after intercourse.  Denies any cloudiness or blood seen in her urine.        Patient Active Problem List    Diagnosis Date Noted     CARDIOVASCULAR SCREENING; LDL GOAL LESS THAN 160 02/10/2010     Priority: Medium     GERD (gastroesophageal reflux disease) 11/09/2009     Priority: Medium     Blood type A+ 09/11/2009     Priority: Medium     Migraine variant 05/31/2005     Priority: Medium     Nasal spray  Followed by Dr. Loving, \[facility]; 8/2011  Doing well on Nortriptyline 50 mg; 2 headaches per week  Problem list name updated by automated process. Provider to review       Current Outpatient Medications   Medication     levonorgestrel-ethinyl estradiol (SEASONALE) 0.15-0.03 MG tablet     magnesium 250  MG tablet     MULTIVITAMINS OR TABS     nortriptyline (PAMELOR) 25 MG capsule     omeprazole (PRILOSEC OTC) 20 MG EC tablet     Riboflavin (VITAMIN B-2 PO)     rizatriptan (MAXALT-MLT) 10 MG ODT     sulfamethoxazole-trimethoprim (BACTRIM DS) 800-160 MG tablet     VITAMIN D, CHOLECALCIFEROL, PO     zonisamide (ZONEGRAN) 100 MG capsule     dexamethasone (DECADRON) 4 MG/ML injection     diclofenac (VOLTAREN) 75 MG EC tablet     hydrocortisone valerate (WEST-IVONE) 0.2 % external ointment     predniSONE (DELTASONE) 20 MG tablet     sulfamethoxazole-trimethoprim (BACTRIM DS) 800-160 MG tablet     Current Facility-Administered Medications   Medication     lidocaine 1 % injection 1 mL     methylPREDNISolone (DEPO-MEDROL) injection 40 mg         Objective    /70   Pulse 89   Temp 98.2  F (36.8  C) (Tympanic)   Resp 16   LMP 04/21/2023 (Approximate)   SpO2 100%   Breastfeeding No   Physical Exam     GEN: NAD    Results for orders placed or performed in visit on 05/05/23   UA Macroscopic with reflex to Microscopic and Culture     Status: Abnormal    Specimen: Urine, Clean Catch   Result Value Ref Range    Color Urine Yellow Colorless, Straw, Light Yellow, Yellow    Appearance Urine Clear Clear    Glucose Urine Negative Negative mg/dL    Bilirubin Urine Negative Negative    Ketones Urine Negative Negative mg/dL    Specific Gravity Urine 1.015 1.003 - 1.035    Blood Urine Negative Negative    pH Urine 7.5 (H) 5.0 - 7.0    Protein Albumin Urine Negative Negative mg/dL    Urobilinogen Urine 0.2 0.2, 1.0 E.U./dL    Nitrite Urine Negative Negative    Leukocyte Esterase Urine Negative Negative    Narrative    Microscopic not indicated   Wet prep - lab collect     Status: Abnormal    Specimen: Vagina; Swab   Result Value Ref Range    Trichomonas Absent Absent    Yeast Absent Absent    Clue Cells Absent Absent    WBCs/high power field 1+ (A) None           The use of Dragon/PowerMic dictation services may have been used to  construct the content in this note; any grammatical or spelling errors are non-intentional. Please contact the author of this note directly if you are in need of any clarification.

## 2023-05-07 LAB — BACTERIA UR CULT: NO GROWTH

## 2023-05-07 ASSESSMENT — ENCOUNTER SYMPTOMS
PARESTHESIAS: 0
NAUSEA: 0
DIARRHEA: 0
ABDOMINAL PAIN: 0
DYSURIA: 1
DIZZINESS: 0
HEARTBURN: 1
COUGH: 0
SHORTNESS OF BREATH: 0
FREQUENCY: 1
NERVOUS/ANXIOUS: 0
JOINT SWELLING: 0
PALPITATIONS: 0
WEAKNESS: 0
EYE PAIN: 1
HEADACHES: 1
CONSTIPATION: 0
ARTHRALGIAS: 1
HEMATOCHEZIA: 0
BREAST MASS: 0
MYALGIAS: 0
FEVER: 0
HEMATURIA: 0
SORE THROAT: 0
CHILLS: 0

## 2023-05-10 ENCOUNTER — OFFICE VISIT (OUTPATIENT)
Dept: FAMILY MEDICINE | Facility: CLINIC | Age: 48
End: 2023-05-10
Payer: COMMERCIAL

## 2023-05-10 VITALS
RESPIRATION RATE: 20 BRPM | HEIGHT: 69 IN | TEMPERATURE: 97 F | WEIGHT: 202 LBS | SYSTOLIC BLOOD PRESSURE: 106 MMHG | HEART RATE: 79 BPM | OXYGEN SATURATION: 100 % | BODY MASS INDEX: 29.92 KG/M2 | DIASTOLIC BLOOD PRESSURE: 71 MMHG

## 2023-05-10 DIAGNOSIS — Z12.11 SCREEN FOR COLON CANCER: ICD-10-CM

## 2023-05-10 DIAGNOSIS — Z00.00 ROUTINE HISTORY AND PHYSICAL EXAMINATION OF ADULT: Primary | ICD-10-CM

## 2023-05-10 PROCEDURE — 90715 TDAP VACCINE 7 YRS/> IM: CPT | Performed by: INTERNAL MEDICINE

## 2023-05-10 PROCEDURE — 90471 IMMUNIZATION ADMIN: CPT | Performed by: INTERNAL MEDICINE

## 2023-05-10 PROCEDURE — 99396 PREV VISIT EST AGE 40-64: CPT | Mod: 25 | Performed by: INTERNAL MEDICINE

## 2023-05-10 ASSESSMENT — ENCOUNTER SYMPTOMS
HEMATURIA: 0
DIARRHEA: 0
FEVER: 0
HEMATOCHEZIA: 0
MYALGIAS: 0
ABDOMINAL PAIN: 0
PALPITATIONS: 0
CONSTIPATION: 0
COUGH: 0
SORE THROAT: 0
CHILLS: 0
SHORTNESS OF BREATH: 0
EYE PAIN: 1
HEADACHES: 1
FREQUENCY: 1
WEAKNESS: 0
JOINT SWELLING: 0
NAUSEA: 0
HEARTBURN: 1
DIZZINESS: 0
ARTHRALGIAS: 1
BREAST MASS: 0
NERVOUS/ANXIOUS: 0
PARESTHESIAS: 0

## 2023-05-10 ASSESSMENT — PAIN SCALES - GENERAL: PAINLEVEL: NO PAIN (0)

## 2023-05-10 NOTE — PROGRESS NOTES
Chief Complaint   Patient presents with     Physical        SUBJECTIVE:   CC: Cassi is an 47 year old who presents for preventive health visit.       5/10/2023     7:01 AM   Additional Questions   Roomed by Morenita GOMES   Accompanied by No one         5/10/2023     7:01 AM   Patient Reported Additional Medications   Patient reports taking the following new medications none     Patient has been advised of split billing requirements and indicates understanding: Yes  Healthy Habits:     Getting at least 3 servings of Calcium per day:  Yes    Bi-annual eye exam:  Yes    Dental care twice a year:  Yes    Sleep apnea or symptoms of sleep apnea:  None    Diet:  Regular (no restrictions)    Frequency of exercise:  4-5 days/week    Duration of exercise:  30-45 minutes    Taking medications regularly:  Yes    Medication side effects:  Other    PHQ-2 Total Score: 0    Additional concerns today:  Yes            Today's PHQ-2 Score:       2023     1:46 PM   PHQ-2 (  [employer])   Q1: Little interest or pleasure in doing things 0   Q2: Feeling down, depressed or hopeless 0   PHQ-2 Score 0   Q1: Little interest or pleasure in doing things Not at all    Not at all   Q2: Feeling down, depressed or hopeless Not at all    Not at all   PHQ-2 Score 0    0           Social History     Tobacco Use     Smoking status: Former     Packs/day: 0.50     Years: 1.00     Pack years: 0.50     Types: Cigarettes     Quit date: 2006     Years since quittin.4     Smokeless tobacco: Never     Tobacco comments:     Started in    Vaping Use     Vaping status: Never Used   Substance Use Topics     Alcohol use: Yes     Comment: 1 drink a month             2023     1:46 PM   Alcohol Use   Prescreen: >3 drinks/day or >7 drinks/week? No     Reviewed orders with patient.  Reviewed health maintenance and updated orders accordingly - Yes  Labs reviewed in EPIC  BP Readings from Last 3 Encounters:   05/10/23 106/71   23 110/70    22 118/66    Wt Readings from Last 3 Encounters:   05/10/23 91.6 kg (202 lb)   22 91.3 kg (201 lb 3.2 oz)   22 96.1 kg (211 lb 12.8 oz)                  Patient Active Problem List   Diagnosis     Migraine variant     Blood type A+     GERD (gastroesophageal reflux disease)     CARDIOVASCULAR SCREENING; LDL GOAL LESS THAN 160     Past Surgical History:   Procedure Laterality Date     BIOPSY BREAST SEED LOCALIZATION Left 04/15/2019    Procedure: left breast seed localized biopsy, left areola punch biopsy;  Surgeon: Paula Rodriguez MD;  Location: [facility] OR     BREAST SURGERY  [id number]    Lump removed     ESOPHAGOSCOPY, GASTROSCOPY, DUODENOSCOPY (EGD), COMBINED N/A 2017    Procedure: COMBINED ESOPHAGOSCOPY, GASTROSCOPY, DUODENOSCOPY (EGD), BIOPSY SINGLE OR MULTIPLE;  ESOPHAGOSCOPY, GASTROSCOPY, DUODENOSCOPY (EGD) with biopsies by cold forcep.  ;  Surgeon: Mario Dey MD;  Location:  GI     ZZC NONSPECIFIC PROCEDURE  2000    Cryotherapy       Social History     Tobacco Use     Smoking status: Former     Packs/day: 0.50     Years: 1.00     Pack years: 0.50     Types: Cigarettes     Quit date: 2006     Years since quittin.4     Smokeless tobacco: Never     Tobacco comments:     Started in    Vaping Use     Vaping status: Never Used   Substance Use Topics     Alcohol use: Yes     Comment: 1 drink a month     Family History   Problem Relation Age of Onset     Family History Negative Brother      Lipids Father      Neurologic Disorder Father         migraines     Coronary Artery Disease Father      Hyperlipidemia Father      Cerebrovascular Disease Father         TIA     Anxiety Disorder Father      Family History Negative Mother      Asthma Mother         2015     Thyroid Disease Mother      Breast Cancer Paternal Aunt      Breast Cancer Paternal Grandmother      Blood Disease Other         paternal uncle with leukemia     Cancer Paternal Aunt         ovarian  cancer         Current Outpatient Medications   Medication Sig Dispense Refill     levonorgestrel-ethinyl estradiol (SEASONALE) 0.15-0.03 MG tablet TAKE 1 TABLET DAILY 91 tablet 1     magnesium 250 MG tablet Take 1 tablet by mouth 2 times daily.       MULTIVITAMINS OR TABS 1 TABLET DAILY       nortriptyline (PAMELOR) 25 MG capsule Take 50 mg by mouth At Bedtime.       omeprazole (PRILOSEC OTC) 20 MG EC tablet Take 1 tablet by mouth daily.       Riboflavin (VITAMIN B-2 PO) Take 200 mg by mouth 2 times daily.       rizatriptan (MAXALT-MLT) 10 MG ODT Take 10 mg by mouth at onset of headache.       VITAMIN D, CHOLECALCIFEROL, PO Take 1,000 Units by mouth daily       zonisamide (ZONEGRAN) 100 MG capsule Take 1 capsule by mouth At Bedtime       Allergies   Allergen Reactions     Seasonal Allergies Other (See Comments)     Penicillins Nausea and Vomiting and Rash     rash     Recent Labs   Lab Test 22  0743 11/02/15  1716   *  --    HDL 56  --    TRIG 154*  --    ALT 43  --    CR 0.77 0.77   GFRESTIMATED >90 84   GFRESTBLACK  --  >90   GFR Calc     POTASSIUM 4.0 3.8   TSH  --  1.14        Breast Cancer Screenin/27/2022     7:46 AM 3/4/2022     7:40 AM   Breast CA Risk Assessment (FHS-7)   Do you have a family history of breast, colon, or ovarian cancer? Yes No / Unknown       click delete button to remove this line now  Mammogram Screening: Recommended annual mammography  Pertinent mammograms are reviewed under the imaging tab.    History of abnormal Pap smear:      Latest Ref Rng & Units 2019     5:10 PM 2019     5:00 PM 2016     4:53 PM   PAP / HPV   PAP (Historical)  NIL       HPV 16 DNA NEG^Negative  Negative   Negative     HPV 18 DNA NEG^Negative  Negative   Negative     Other HR HPV NEG^Negative  Negative   Negative       Reviewed and updated as needed this visit by clinical staff   Tobacco  Allergies  Meds  Problems  Med Hx  Surg Hx  Fam Hx           Reviewed and updated as needed this visit by Provider   Tobacco  Allergies  Meds  Problems  Med Hx  Surg Hx  Fam Hx         Past Medical History:   Diagnosis Date     Gastroesophageal reflux disease      Pain in joint, pelvic region and thigh      Variants of migraine, not elsewhere classified, without mention of intractable migraine without mention of status migrainosus     Nasal spray      Past Surgical History:   Procedure Laterality Date     BIOPSY BREAST SEED LOCALIZATION Left 04/15/2019    Procedure: left breast seed localized biopsy, left areola punch biopsy;  Surgeon: Paula Rodriguez MD;  Location:  OR     BREAST SURGERY  [id number]    Lump removed     ESOPHAGOSCOPY, GASTROSCOPY, DUODENOSCOPY (EGD), COMBINED N/A 04/20/2017    Procedure: COMBINED ESOPHAGOSCOPY, GASTROSCOPY, DUODENOSCOPY (EGD), BIOPSY SINGLE OR MULTIPLE;  ESOPHAGOSCOPY, GASTROSCOPY, DUODENOSCOPY (EGD) with biopsies by cold forcep.  ;  Surgeon: Mario Dey MD;  Location:  GI     ZC NONSPECIFIC PROCEDURE  12/01/2000    Cryotherapy       Review of Systems   Constitutional: Negative for chills and fever.   HENT: Negative for congestion, ear pain, hearing loss and sore throat.    Eyes: Positive for pain. Negative for visual disturbance.   Respiratory: Negative for cough and shortness of breath.    Cardiovascular: Negative for chest pain, palpitations and peripheral edema.   Gastrointestinal: Positive for heartburn (more since yougest child born; 4/20/2017 EGD with biopsy ; negative; using OTC PPI with good results; also working on weight loss.). Negative for abdominal pain, constipation, diarrhea, hematochezia and nausea.   Breasts:  Negative for tenderness, breast mass and discharge.   Genitourinary: Positive for frequency (no acute symptoms ) and urgency. Negative for genital sores, hematuria, pelvic pain, vaginal bleeding and vaginal discharge.   Musculoskeletal: Positive for arthralgias. Negative for joint swelling and  "myalgias.   Skin: Negative for rash.   Neurological: Positive for headaches (longstanding; sees Neurology; meds unchanged.). Negative for dizziness, weakness and paresthesias.   Psychiatric/Behavioral: Negative for mood changes. The patient is not nervous/anxious.      Left lower eye lid- periodic irritation; has been to derm and eye doctor- meds helpful but then recurs.        OBJECTIVE:   /71 (BP Location: Right arm, Patient Position: Sitting, Cuff Size: Adult Large)   Pulse 79   Temp 97  F (36.1  C) (Oral)   Resp 20   Ht 1.753 m (5' 9\")   Wt 91.6 kg (202 lb)   LMP 04/21/2023 (Approximate)   SpO2 100%   BMI 29.83 kg/m    Physical Exam  GENERAL: healthy, alert and no distress  EYES: Eyes grossly normal to inspection and left lower eye lid with minimal irritation; no tear duct blockage or no discharge.   NECK: no adenopathy, no asymmetry, masses, or scars and thyroid normal to palpation  RESP: lungs clear to auscultation - no rales, rhonchi or wheezes  BREAST: normal without masses, tenderness or nipple discharge and no palpable axillary masses or adenopathy  CV: regular rate and rhythm, normal S1 S2, no S3 or S4, no murmur, click or rub, no peripheral edema and peripheral pulses strong  ABDOMEN: soft, nontender, no hepatosplenomegaly, no masses and bowel sounds normal  MS: no gross musculoskeletal defects noted, no edema  SKIN: no suspicious lesions or rashes  NEURO: Normal strength and tone, sensory exam grossly normal, mentation intact, gait normal including heel/toe/tandem walking and Romberg normal  PSYCH: mentation appears normal, affect normal/bright    Diagnostic Test Results:  Labs reviewed in Epic              ASSESSMENT/PLAN:   (Z00.00) Routine history and physical examination of adult  (primary encounter diagnosis)  Comment: HEALTH CARE MAINTENANCE reviewed; reviewed additional eye care options with  J&J no tears shampoo.  Due for TDAP; mammo up to date, colon screening reviewed " "options  Weight management;   Using Weight [employer] Erick, activity and healthy eating.   Plan:     (Z12.11) Screen for colon cancer  Comment:  colon screening reviewed options  Plan: Colonoscopy Screening  Referral            Patient has been advised of split billing requirements and indicates understanding: Yes      COUNSELING:  Reviewed preventive health counseling, as reflected in patient instructions       Regular exercise       Healthy diet/nutrition       Immunizations    Vaccinated for: TDAP            BMI:   Estimated body mass index is 29.83 kg/m  as calculated from the following:    Height as of this encounter: 1.753 m (5' 9\").    Weight as of this encounter: 91.6 kg (202 lb).   Weight management plan: Discussed healthy diet and exercise guidelines      She reports that she quit smoking about 16 years ago. Her smoking use included cigarettes. She has a 0.50 pack-year smoking history. She has never used smokeless tobacco.        Kim Penn MD  Internal Medicine   [facility]  "

## 2023-05-10 NOTE — PROGRESS NOTES
Prior to immunization administration, verified patients identity using patient s name and date of birth. Please see Immunization Activity for additional information.     Screening Questionnaire for Adult Immunization    Are you sick today?   No   Do you have allergies to medications, food, a vaccine component or latex?   No   Have you ever had a serious reaction after receiving a vaccination?   No   Do you have a long-term health problem with heart, lung, kidney, or metabolic disease (e.g., diabetes), asthma, a blood disorder, no spleen, complement component deficiency, a cochlear implant, or a spinal fluid leak?  Are you on long-term aspirin therapy?   No   Do you have cancer, leukemia, HIV/AIDS, or any other immune system problem?   No   Do you have a parent, brother, or sister with an immune system problem?   No   In the past 3 months, have you taken medications that affect  your immune system, such as prednisone, other steroids, or anticancer drugs; drugs for the treatment of rheumatoid arthritis, Crohn s disease, or psoriasis; or have you had radiation treatments?   No   Have you had a seizure, or a brain or other nervous system problem?   No   During the past year, have you received a transfusion of blood or blood    products, or been given immune (gamma) globulin or antiviral drug?   No   For women: Are you pregnant or is there a chance you could become       pregnant during the next month?   No   Have you received any vaccinations in the past 4 weeks?   No     Immunization questionnaire answers were all negative.      Injection of Tdap given by Morenita Osullivan MA. Patient instructed to remain in clinic for 15 minutes afterwards, and to report any adverse reactions.     Screening performed by Morenita Osullivan MA on 5/10/2023 at 8:01 AM.

## 2023-06-26 ENCOUNTER — TRANSFERRED RECORDS (OUTPATIENT)
Dept: HEALTH INFORMATION MANAGEMENT | Facility: CLINIC | Age: 48
End: 2023-06-26
Payer: COMMERCIAL

## 2023-07-17 ENCOUNTER — TRANSFERRED RECORDS (OUTPATIENT)
Dept: HEALTH INFORMATION MANAGEMENT | Facility: CLINIC | Age: 48
End: 2023-07-17

## 2023-07-18 ENCOUNTER — TELEPHONE (OUTPATIENT)
Dept: GASTROENTEROLOGY | Facility: CLINIC | Age: 48
End: 2023-07-18
Payer: COMMERCIAL

## 2023-07-18 NOTE — TELEPHONE ENCOUNTER
"Endoscopy Scheduling Screen    Have you had a positive Covid test in the last 14 days?  No    Are you active on MyChart?   Yes    What insurance is in the chart?  BC/BS: Schedule in [facility] unless patient meets exclusion criteria.     Ordering/Referring Provider: SERENITY HUGHES   (If ordering provider performs procedure, schedule with ordering provider unless otherwise instructed. )    BMI: Estimated body mass index is 29.83 kg/m  as calculated from the following:    Height as of 5/10/23: 1.753 m (5' 9\").    Weight as of 5/10/23: 91.6 kg (202 lb).     Sedation Ordered  moderate sedation.   If patient BMI > 50 do not schedule in [facility].    Are you taking any prescription medications for pain?   No    Are you taking methadone or Suboxone?  No    Do you have a history of malignant hyperthermia or adverse reaction to anesthesia?  No    (Females) Are you currently pregnant?   No     Have you been diagnosed or told you have pulmonary hypertension?   No    Do you have an LVAD?  No    Have you been told you have moderate to severe sleep apnea?  No    Have you been told you have COPD, asthma, or any other lung disease?  No    Do you have any heart conditions?  No     Have you ever had or are you awaiting a heart or lung transplant?   No    Have you had a stroke or transient ischemic attack (TIA aka \"mini stroke\" in the last 6 months?   No    Have you been diagnosed with or been told you have cirrhosis of the liver?   No    Are you currently on dialysis?   No    Do you need assistance transferring?   No    BMI: Estimated body mass index is 29.83 kg/m  as calculated from the following:    Height as of 5/10/23: 1.753 m (5' 9\").    Weight as of 5/10/23: 91.6 kg (202 lb).     Is patients BMI > 40 and scheduling location [facility]?  No    Do you take the medication Phentermine, Ozempic or Wegovy?  No    Do you take the medication Naltrexone?  No    Do you take blood thinners?  No      Prep   Are you currently on dialysis or do you have " chronic kidney disease?  No    Do you have a diagnosis of diabetes?  No    Do you have a diagnosis of cystic fibrosis (CF)?  No    On a regular basis do you go 3 -5 days between bowel movements?  No    BMI > 40?  No    Preferred Pharmacy:    [id number] - [place], [place] - [address] [facility]  [address] [place] [address] [address]  [place] [address]  Phone: [phone] Fax: [phone]      Final Scheduling Details   Colonoscopy prep sent?  MiraLAX (No Mag Citrate)    Procedure scheduled  Colonoscopy    Surgeon:  ROCK     Date of procedure:  9/27     Schedule PAC:   No    Location  [facility]    Sedation   Moderate Sedation    Patient Reminders:    You will receive a call from a Nurse to review instructions and health history.  This assessment must be completed prior to your procedure.  Failure to complete the Nurse assessment may result in the procedure being cancelled.       On the day of your procedure, please designate an adult(s) who can drive you home stay with you for the next 24 hours. The medicines used in the exam will make you sleepy. You will not be able to drive.       You cannot take public transportation, ride share services, or non-medical taxi service without a responsible caregiver.  Medical transport services are allowed with the requirement that a responsible caregiver will receive you at your destination.  We require that drivers and caregivers are confirmed prior to your procedure.

## 2023-08-14 DIAGNOSIS — Z30.09 GENERAL COUNSELING FOR PRESCRIPTION OF ORAL CONTRACEPTIVES: ICD-10-CM

## 2023-08-16 RX ORDER — LEVONORGESTREL AND ETHINYL ESTRADIOL 0.15-0.03
KIT ORAL
Qty: 91 TABLET | Refills: 0 | Status: SHIPPED | OUTPATIENT
Start: 2023-08-16 | End: 2023-11-13

## 2023-08-17 NOTE — PATIENT INSTRUCTIONS
Patient Education     Face Laceration: Skin Glue  A laceration is a cut through the skin. A laceration on your face has been closed with skin glue. This is used on cuts that have smooth edges that can be brought together easily, and are not infected. Skin glue is best used on clean, straight cuts on the face in areas that don't get a lot of tension. In some cases, a lower layer of skin may be stitched closed before skin glue is put on. The skin glue closes the cut within a few minutes. It also provides a water-resistant cover. No bandage is needed. Skin glue peels off on its own within 5 to 10 days.      Home care    Follow all instructions for taking medicines.  ? Your healthcare provider may prescribe an antibiotic. This is to help prevent infection. Take the medicine every day until it's gone, even if you are feeling better, or you are told to stop. You should not have any left over.  ? The healthcare provider may prescribe medicines for pain. Use them as directed.    Follow the healthcare provider s instructions on how to care for the cut.    Keep the wound clean and dry. You may shower or bathe as usual, but don't use soaps, lotions, or ointments on the wound area, as these may dissolve the glue. Don't scrub the wound. After bathing, pat the wound dry with a soft towel. Don't soak the cut in water for 7 to10 days.    Don't scratch, rub, or pick at the film. Don't place tape directly over the film.    Don't apply liquids such as peroxide, ointments, or creams to the wound while the film is in place.    Watch for the signs of infection which are listed below. Most facial skin wounds heal without problems. But an infection sometimes occurs despite proper treatment.    Keep the wound out of prolonged direct sunlight, especially in the summer months. Sunburn or sun exposure can increase scarring.    Follow-up care  Follow up with your healthcare provider, or as advised. If skin glue was used, the film will fall off  by itself in 5 to10 days.    When to seek medical advice  Call your healthcare provider right away if any of these occur:    Wound bleeding more than a small amount or bleeding doesn't stop    Decreased movement around the injured area    Signs of infection:  ? Increasing pain in the wound  ? Increasing wound redness or swelling  ? Pus or bad odor coming from the wound  ? Fever of 100.4 F (38. C), chills, or as directed by your healthcare provider    Wound edges reopening    Lots of itching    Skin blisters  [employer] last reviewed this educational content on 6/1/2020 2000-2021 [employer]. All rights reserved. This information is not intended as a substitute for professional medical care. Always follow your healthcare professional's instructions.            Oxybutynin Counseling:  I discussed with the patient the risks of oxybutynin including but not limited to skin rash, drowsiness, dry mouth, difficulty urinating, and blurred vision.

## 2023-09-05 ENCOUNTER — OFFICE VISIT (OUTPATIENT)
Dept: URGENT CARE | Facility: URGENT CARE | Age: 48
End: 2023-09-05
Payer: COMMERCIAL

## 2023-09-05 VITALS
DIASTOLIC BLOOD PRESSURE: 60 MMHG | OXYGEN SATURATION: 100 % | SYSTOLIC BLOOD PRESSURE: 102 MMHG | BODY MASS INDEX: 30.27 KG/M2 | HEART RATE: 89 BPM | TEMPERATURE: 97.8 F | WEIGHT: 205 LBS

## 2023-09-05 DIAGNOSIS — L03.112 CELLULITIS OF LEFT AXILLA: Primary | ICD-10-CM

## 2023-09-05 PROCEDURE — 99213 OFFICE O/P EST LOW 20 MIN: CPT | Performed by: PHYSICIAN ASSISTANT

## 2023-09-05 RX ORDER — CEPHALEXIN 500 MG/1
500 CAPSULE ORAL 4 TIMES DAILY
Qty: 28 CAPSULE | Refills: 0 | Status: SHIPPED | OUTPATIENT
Start: 2023-09-05 | End: 2023-09-12

## 2023-09-05 NOTE — PATIENT INSTRUCTIONS
-Complete antibiotics as prescribed. Take with food to help prevent stomach upset.   -Elevate the affected limb as much as possible. This will help keep the swelling down.  -Keep the infected area clean; warm water soak with mild soap for 10-15 minutes 3 times a day. Pat dry.  -Take Tylenol 650mg every 4 hours or ibuprofen 600mg every 6 hours by mouth for pain/fever.  Do not exceed 4000mg of acetaminophen or 2400mg of ibuprofen from any source in a 24 hour period.  Taking Tylenol and ibuprofen together may be helpful in reducing pain.   -If develop a fever, increased redness, pain, drainage or swelling from the area, should contact primary care clinic/provider.  -Symptoms should be improving within 48 hours and resolved in next 7-10 days.     Try Hibiclens soap: 2-3 times/week for 4-6 weeks.

## 2023-09-05 NOTE — PROGRESS NOTES
Assessment/Plan:    C/w cellulitis & associated lymphadenopathy vs early abscess/cellulitis. Do not feel I & D is indicated at this time due to no fluctuance and very small size of area involved. Rx keflex, continue warm compresses. Considered possibility of HS but feel unlikely due to no scarring/sinus tracts; can see dermatology if symptoms continue to reoccur. Also suggested use of Hibiclens due to two infections  in last few months.     See patient instructions below.    At the end of the encounter, I discussed results, diagnosis, medications. Discussed red flags for immediate return to clinic/ER, as well as indications for follow up if no improvement. Patient understood and agreed to plan. Patient was stable for discharge.      ICD-10-CM    1. Cellulitis of left axilla  L03.112 cephALEXin (KEFLEX) 500 MG capsule            Return in about 3 days (around 9/8/2023) for Follow up w/ primary care provider if not better.    KRIS Mckeon, KAYLA  [facility]  -----------------------------------------------------------------------------------------------------------------------------------------------------    HPI:  Cassi Ye is a 47 year old female who presents for evaluation of swollen, red, tender area in L axilla onset 3 days ago. She had similar symptoms 3 months ago which resolved with Keflex. No treatments tried. Patient reports no fever/chills, purulent drainage, or any other symptoms.     Past Medical History:   Diagnosis Date    Gastroesophageal reflux disease     Pain in joint, pelvic region and thigh     Variants of migraine, not elsewhere classified, without mention of intractable migraine without mention of status migrainosus     Nasal spray       Vitals:    09/05/23 1049   BP: 102/60   Pulse: 89   Temp: 97.8  F (36.6  C)   TempSrc: Tympanic   SpO2: 100%   Weight: 93 kg (205 lb)       Physical Exam  Vitals and nursing note reviewed.   Pulmonary:      Effort:  Pulmonary effort is normal.   Skin:     Comments: Pea sized subcutaneous mass with overlying erythema & tenderness to L axilla. No streaking or drainage   Neurological:      Mental Status: She is alert.         Labs/Imaging:  No results found for this or any previous visit (from the past 24 hour(s)).  No results found for this or any previous visit (from the past 24 hour(s)).        Patient Instructions   -Complete antibiotics as prescribed. Take with food to help prevent stomach upset.   -Elevate the affected limb as much as possible. This will help keep the swelling down.  -Keep the infected area clean; warm water soak with mild soap for 10-15 minutes 3 times a day. Pat dry.  -Take Tylenol 650mg every 4 hours or ibuprofen 600mg every 6 hours by mouth for pain/fever.  Do not exceed 4000mg of acetaminophen or 2400mg of ibuprofen from any source in a 24 hour period.  Taking Tylenol and ibuprofen together may be helpful in reducing pain.   -If develop a fever, increased redness, pain, drainage or swelling from the area, should contact primary care clinic/provider.  -Symptoms should be improving within 48 hours and resolved in next 7-10 days.     Try Hibiclens soap: 2-3 times/week for 4-6 weeks.

## 2023-09-27 ENCOUNTER — HOSPITAL ENCOUNTER (OUTPATIENT)
Facility: CLINIC | Age: 48
Discharge: HOME | End: 2023-09-27
Attending: INTERNAL MEDICINE | Admitting: INTERNAL MEDICINE
Payer: COMMERCIAL

## 2023-09-27 VITALS
SYSTOLIC BLOOD PRESSURE: 118 MMHG | RESPIRATION RATE: 20 BRPM | OXYGEN SATURATION: 96 % | HEART RATE: 85 BPM | DIASTOLIC BLOOD PRESSURE: 75 MMHG

## 2023-09-27 LAB — COLONOSCOPY: NORMAL

## 2023-09-27 PROCEDURE — G0121 COLON CA SCRN NOT HI RSK IND: HCPCS | Performed by: INTERNAL MEDICINE

## 2023-09-27 PROCEDURE — 250N000011 HC RX IP 250 OP 636: Performed by: INTERNAL MEDICINE

## 2023-09-27 PROCEDURE — 45378 DIAGNOSTIC COLONOSCOPY: CPT | Performed by: INTERNAL MEDICINE

## 2023-09-27 PROCEDURE — G0500 MOD SEDAT ENDO SERVICE >5YRS: HCPCS | Performed by: INTERNAL MEDICINE

## 2023-09-27 RX ORDER — NALOXONE HYDROCHLORIDE 0.4 MG/ML
0.4 INJECTION, SOLUTION INTRAMUSCULAR; INTRAVENOUS; SUBCUTANEOUS
Status: DISCONTINUED | OUTPATIENT
Start: 2023-09-27 | End: 2023-09-27 | Stop reason: HOSPADM

## 2023-09-27 RX ORDER — ONDANSETRON 2 MG/ML
4 INJECTION INTRAMUSCULAR; INTRAVENOUS EVERY 6 HOURS PRN
Status: DISCONTINUED | OUTPATIENT
Start: 2023-09-27 | End: 2023-09-27 | Stop reason: HOSPADM

## 2023-09-27 RX ORDER — EPINEPHRINE 1 MG/ML
0.1 INJECTION, SOLUTION INTRAMUSCULAR; SUBCUTANEOUS
Status: DISCONTINUED | OUTPATIENT
Start: 2023-09-27 | End: 2023-09-27 | Stop reason: HOSPADM

## 2023-09-27 RX ORDER — PROCHLORPERAZINE MALEATE 10 MG
10 TABLET ORAL EVERY 6 HOURS PRN
Status: DISCONTINUED | OUTPATIENT
Start: 2023-09-27 | End: 2023-09-27 | Stop reason: HOSPADM

## 2023-09-27 RX ORDER — NALOXONE HYDROCHLORIDE 0.4 MG/ML
0.2 INJECTION, SOLUTION INTRAMUSCULAR; INTRAVENOUS; SUBCUTANEOUS
Status: DISCONTINUED | OUTPATIENT
Start: 2023-09-27 | End: 2023-09-27 | Stop reason: HOSPADM

## 2023-09-27 RX ORDER — SIMETHICONE 40MG/0.6ML
133 SUSPENSION, DROPS(FINAL DOSAGE FORM)(ML) ORAL
Status: DISCONTINUED | OUTPATIENT
Start: 2023-09-27 | End: 2023-09-27 | Stop reason: HOSPADM

## 2023-09-27 RX ORDER — FLUMAZENIL 0.1 MG/ML
0.2 INJECTION, SOLUTION INTRAVENOUS
Status: DISCONTINUED | OUTPATIENT
Start: 2023-09-27 | End: 2023-09-27 | Stop reason: HOSPADM

## 2023-09-27 RX ORDER — FENTANYL CITRATE 50 UG/ML
50-100 INJECTION, SOLUTION INTRAMUSCULAR; INTRAVENOUS EVERY 5 MIN PRN
Status: DISCONTINUED | OUTPATIENT
Start: 2023-09-27 | End: 2023-09-27 | Stop reason: HOSPADM

## 2023-09-27 RX ORDER — ONDANSETRON 4 MG/1
4 TABLET, ORALLY DISINTEGRATING ORAL EVERY 6 HOURS PRN
Status: DISCONTINUED | OUTPATIENT
Start: 2023-09-27 | End: 2023-09-27 | Stop reason: HOSPADM

## 2023-09-27 RX ORDER — DIPHENHYDRAMINE HYDROCHLORIDE 50 MG/ML
25-50 INJECTION INTRAMUSCULAR; INTRAVENOUS
Status: DISCONTINUED | OUTPATIENT
Start: 2023-09-27 | End: 2023-09-27 | Stop reason: HOSPADM

## 2023-09-27 RX ORDER — ATROPINE SULFATE 0.1 MG/ML
1 INJECTION INTRAVENOUS
Status: DISCONTINUED | OUTPATIENT
Start: 2023-09-27 | End: 2023-09-27 | Stop reason: HOSPADM

## 2023-09-27 RX ORDER — ONDANSETRON 2 MG/ML
4 INJECTION INTRAMUSCULAR; INTRAVENOUS
Status: DISCONTINUED | OUTPATIENT
Start: 2023-09-27 | End: 2023-09-27 | Stop reason: HOSPADM

## 2023-09-27 RX ORDER — LIDOCAINE 40 MG/G
CREAM TOPICAL
Status: DISCONTINUED | OUTPATIENT
Start: 2023-09-27 | End: 2023-09-27 | Stop reason: HOSPADM

## 2023-09-27 RX ADMIN — MIDAZOLAM 1 MG: 1 INJECTION INTRAMUSCULAR; INTRAVENOUS at 09:04

## 2023-09-27 RX ADMIN — FENTANYL CITRATE 100 MCG: 50 INJECTION INTRAMUSCULAR; INTRAVENOUS at 08:59

## 2023-09-27 RX ADMIN — MIDAZOLAM 3 MG: 1 INJECTION INTRAMUSCULAR; INTRAVENOUS at 08:59

## 2023-09-27 ASSESSMENT — ACTIVITIES OF DAILY LIVING (ADL): ADLS_ACUITY_SCORE: 35

## 2023-09-27 NOTE — CONSULTS
Pre-Endoscopy History and Physical     Cassi Ye MRN# [id number]   YOB: 1975 Age: 47 year old     Date of Procedure: 9/27/2023  Primary care provider: Kim Penn  Type of Endoscopy: colonoscopy  Reason for Procedure: colon cancer screening  Type of Anesthesia Anticipated: Moderate (conscious) sedation    HPI:    Cassi is a 47 year old female who will be undergoing the above procedure.      A history and physical has been performed. The patient's medications and allergies have been reviewed. The risks and benefits of the procedure and the sedation options and risks were discussed with the patient.  All questions were answered and informed consent was obtained.      She denies a personal or family history of anesthesia complications or bleeding disorders.     Allergies   Allergen Reactions    Seasonal Allergies Other (See Comments)    Penicillins Nausea and Vomiting and Rash     rash        Current Facility-Administered Medications   Medication    atropine injection 1 mg    benzocaine 20% (HURRICAINE/TOPEX) 20 % spray 0.5 mL    diphenhydrAMINE (BENADRYL) injection 25-50 mg    EPINEPHrine (Anaphylaxis) (ADRENALIN) injection (vial) 0.1 mg    fentaNYL (PF) (SUBLIMAZE) injection  mcg    flumazenil (ROMAZICON) injection 0.2 mg    glucagon injection 0.5 mg    midazolam (VERSED) injection 0.5-2 mg    naloxone (NARCAN) injection 0.2 mg    Or    naloxone (NARCAN) injection 0.4 mg    Or    naloxone (NARCAN) injection 0.2 mg    Or    naloxone (NARCAN) injection 0.4 mg    simethicone (MYLICON) suspension 133 mg    sodium chloride (PF) 0.9% PF flush 3 mL    sodium chloride 0.9% BOLUS 500 mL       Patient Active Problem List   Diagnosis    Migraine variant    Blood type A+    GERD (gastroesophageal reflux disease)    CARDIOVASCULAR SCREENING; LDL GOAL LESS THAN 160        Past Medical History:   Diagnosis Date    Gastroesophageal reflux disease     Pain in joint, pelvic region and thigh      Variants of migraine, not elsewhere classified, without mention of intractable migraine without mention of status migrainosus     Nasal spray        Past Surgical History:   Procedure Laterality Date    BIOPSY BREAST SEED LOCALIZATION Left 04/15/2019    Procedure: left breast seed localized biopsy, left areola punch biopsy;  Surgeon: Paula Rodriguez MD;  Location: [facility] OR    BREAST SURGERY  [id number]    Lump removed    ESOPHAGOSCOPY, GASTROSCOPY, DUODENOSCOPY (EGD), COMBINED N/A 2017    Procedure: COMBINED ESOPHAGOSCOPY, GASTROSCOPY, DUODENOSCOPY (EGD), BIOPSY SINGLE OR MULTIPLE;  ESOPHAGOSCOPY, GASTROSCOPY, DUODENOSCOPY (EGD) with biopsies by cold forcep.  ;  Surgeon: Mario Dey MD;  Location:  GI    [facility] NONSPECIFIC PROCEDURE  2000    Cryotherapy       Social History     Tobacco Use    Smoking status: Former     Packs/day: 0.50     Years: 1.00     Pack years: 0.50     Types: Cigarettes     Quit date: 2006     Years since quittin.8    Smokeless tobacco: Never    Tobacco comments:     Started in    Substance Use Topics    Alcohol use: Yes     Comment: 1 drink a month       Family History   Problem Relation Age of Onset    Family History Negative Mother     Asthma Mother         2015    Thyroid Disease Mother     Lipids Father     Neurologic Disorder Father         migraines    Coronary Artery Disease Father     Hyperlipidemia Father     Cerebrovascular Disease Father         TIA    Anxiety Disorder Father     Breast Cancer Paternal Grandmother     Family History Negative Brother     Breast Cancer Paternal Aunt     Cancer Paternal Aunt         ovarian cancer    Blood Disease Other         paternal uncle with leukemia    Colon Cancer No family hx of        REVIEW OF SYSTEMS:     5 point ROS negative except as noted above in HPI, including Gen., Resp., CV, GI &  system review.    PHYSICAL EXAM:   There were no vitals taken for this visit. Estimated body mass index is  "30.27 kg/m  as calculated from the following:    Height as of 5/10/23: 1.753 m (5' 9\").    Weight as of 9/5/23: 93 kg (205 lb).   GENERAL APPEARANCE: healthy  MENTAL STATUS: alert  AIRWAY EXAM: Mallampatti Class I (visualization of the soft palate, fauces, uvula, anterior and posterior pillars)  RESP: lungs clear to auscultation - no rales, rhonchi or wheezes  CV: regular rates and rhythm    DIAGNOSTICS:      Not indicated    IMPRESSION     ASA Class 2 - Mild systemic disease    PLAN:     Colonoscopy    The above has been forwarded to the consulting provider.    Signed Electronically by: Toby Alvarez MD  September 27, 2023    "

## 2023-10-30 ENCOUNTER — TRANSFERRED RECORDS (OUTPATIENT)
Dept: HEALTH INFORMATION MANAGEMENT | Facility: CLINIC | Age: 48
End: 2023-10-30
Payer: COMMERCIAL

## 2023-11-12 DIAGNOSIS — Z30.09 GENERAL COUNSELING FOR PRESCRIPTION OF ORAL CONTRACEPTIVES: ICD-10-CM

## 2023-11-13 ENCOUNTER — TRANSFERRED RECORDS (OUTPATIENT)
Dept: HEALTH INFORMATION MANAGEMENT | Facility: CLINIC | Age: 48
End: 2023-11-13
Payer: COMMERCIAL

## 2023-11-13 RX ORDER — LEVONORGESTREL / ETHINYL ESTRADIOL 0.15-0.03
1 KIT ORAL DAILY
Qty: 91 TABLET | Refills: 3 | Status: SHIPPED | OUTPATIENT
Start: 2023-11-13

## 2023-12-18 ENCOUNTER — TRANSFERRED RECORDS (OUTPATIENT)
Dept: HEALTH INFORMATION MANAGEMENT | Facility: CLINIC | Age: 48
End: 2023-12-18
Payer: COMMERCIAL

## 2024-01-09 ENCOUNTER — TRANSFERRED RECORDS (OUTPATIENT)
Dept: HEALTH INFORMATION MANAGEMENT | Facility: CLINIC | Age: 49
End: 2024-01-09

## 2024-03-18 ENCOUNTER — HOSPITAL ENCOUNTER (OUTPATIENT)
Dept: MAMMOGRAPHY | Facility: CLINIC | Age: 49
Discharge: HOME | End: 2024-03-18
Attending: INTERNAL MEDICINE | Admitting: INTERNAL MEDICINE
Payer: COMMERCIAL

## 2024-03-18 DIAGNOSIS — Z12.31 VISIT FOR SCREENING MAMMOGRAM: ICD-10-CM

## 2024-03-18 PROCEDURE — 77067 SCR MAMMO BI INCL CAD: CPT

## 2024-05-06 ENCOUNTER — E-VISIT (OUTPATIENT)
Dept: URGENT CARE | Facility: CLINIC | Age: 49
End: 2024-05-06
Payer: COMMERCIAL

## 2024-05-06 ENCOUNTER — OFFICE VISIT (OUTPATIENT)
Dept: URGENT CARE | Facility: URGENT CARE | Age: 49
End: 2024-05-06
Payer: COMMERCIAL

## 2024-05-06 VITALS
SYSTOLIC BLOOD PRESSURE: 118 MMHG | TEMPERATURE: 98.1 F | HEART RATE: 76 BPM | DIASTOLIC BLOOD PRESSURE: 78 MMHG | WEIGHT: 207 LBS | BODY MASS INDEX: 28.98 KG/M2 | HEIGHT: 71 IN | OXYGEN SATURATION: 100 %

## 2024-05-06 DIAGNOSIS — L02.412 ABSCESS OF LEFT AXILLA: Primary | ICD-10-CM

## 2024-05-06 DIAGNOSIS — L02.92 BOIL: Primary | ICD-10-CM

## 2024-05-06 PROCEDURE — 99207 PR NON-BILLABLE SERV PER CHARTING: CPT | Performed by: NURSE PRACTITIONER

## 2024-05-06 PROCEDURE — 99213 OFFICE O/P EST LOW 20 MIN: CPT | Performed by: PHYSICIAN ASSISTANT

## 2024-05-06 RX ORDER — CEFDINIR 300 MG/1
300 CAPSULE ORAL 2 TIMES DAILY
Qty: 14 CAPSULE | Refills: 0 | Status: SHIPPED | OUTPATIENT
Start: 2024-05-06 | End: 2024-05-13

## 2024-05-06 NOTE — PATIENT INSTRUCTIONS
Dear Cassi Ye,    We are sorry you are not feeling well. Based on the responses you provided, it is recommended that you be seen in-person in urgent care so we can better evaluate your symptoms. Please click here to find the nearest urgent care location to you.   You will not be charged for this Visit. Thank you for trusting us with your care.    ARTIE Barr CNP

## 2024-05-06 NOTE — PROGRESS NOTES
"URGENT CARE VISIT:    SUBJECTIVE:   Cassi Ye is a 48 year old female who presents to the clinic with an abscess located on right armpit that started 2 day(s) ago. Associated symptoms include pain.  Denies fever, chills, and drainage. Symptoms have been worsening since onset. Treatments tried include none with no relief of symptoms.       OBJECTIVE:  The patient appears today in alert,no apparent distress distress  VITALS: /78   Pulse 76   Temp 98.1  F (36.7  C)   Ht 1.791 m (5' 10.5\")   Wt 93.9 kg (207 lb)   SpO2 100%   BMI 29.28 kg/m    General: WDWN in NAD  Musculoskeletal: moderate ttp over left axilla  Skin: Erythematous, non-fluctuant nodule is visualized over right axilla. It is 2 cm in size.   Neurology: Sensation to light touch intact      ASSESSMENT:    ICD-10-CM    1. Abscess of left axilla  L02.412 cefdinir (OMNICEF) 300 MG capsule          PLAN:  Patient Instructions   Patient was educated on the natural course of condition.  Abscess is not a candidate for I & D. Take medication as prescribed. Side effect discussed. Conservative measures discussed including warm compresses, and over-the-counter analgesics (Tylenol or Ibuprofen).  Keep wound clean and dry. See your primary care provider if symptoms worsen or do not improve in 7 days. Seek emergency care if you develop fever, severe swelling, or increased redness.         Carli Blevins PA-C ....................  5/6/2024   7:01 PM    "

## 2024-05-08 ENCOUNTER — TRANSFERRED RECORDS (OUTPATIENT)
Dept: HEALTH INFORMATION MANAGEMENT | Facility: CLINIC | Age: 49
End: 2024-05-08
Payer: COMMERCIAL

## 2024-05-09 SDOH — HEALTH STABILITY: PHYSICAL HEALTH: ON AVERAGE, HOW MANY DAYS PER WEEK DO YOU ENGAGE IN MODERATE TO STRENUOUS EXERCISE (LIKE A BRISK WALK)?: 5 DAYS

## 2024-05-09 SDOH — HEALTH STABILITY: PHYSICAL HEALTH: ON AVERAGE, HOW MANY MINUTES DO YOU ENGAGE IN EXERCISE AT THIS LEVEL?: 30 MIN

## 2024-05-09 ASSESSMENT — SOCIAL DETERMINANTS OF HEALTH (SDOH): HOW OFTEN DO YOU GET TOGETHER WITH FRIENDS OR RELATIVES?: ONCE A WEEK

## 2024-05-15 ENCOUNTER — OFFICE VISIT (OUTPATIENT)
Dept: INTERNAL MEDICINE | Facility: CLINIC | Age: 49
End: 2024-05-15
Payer: COMMERCIAL

## 2024-05-15 VITALS
WEIGHT: 209.2 LBS | DIASTOLIC BLOOD PRESSURE: 76 MMHG | BODY MASS INDEX: 29.29 KG/M2 | SYSTOLIC BLOOD PRESSURE: 124 MMHG | RESPIRATION RATE: 16 BRPM | OXYGEN SATURATION: 99 % | HEIGHT: 71 IN | HEART RATE: 114 BPM | TEMPERATURE: 98.1 F

## 2024-05-15 DIAGNOSIS — R63.5 WEIGHT GAIN: ICD-10-CM

## 2024-05-15 DIAGNOSIS — G43.809 MIGRAINE VARIANT: ICD-10-CM

## 2024-05-15 DIAGNOSIS — K21.9 GASTROESOPHAGEAL REFLUX DISEASE WITHOUT ESOPHAGITIS: ICD-10-CM

## 2024-05-15 DIAGNOSIS — Z00.00 ROUTINE GENERAL MEDICAL EXAMINATION AT A HEALTH CARE FACILITY: Primary | ICD-10-CM

## 2024-05-15 PROCEDURE — 99214 OFFICE O/P EST MOD 30 MIN: CPT | Mod: 25 | Performed by: INTERNAL MEDICINE

## 2024-05-15 PROCEDURE — 99396 PREV VISIT EST AGE 40-64: CPT | Performed by: INTERNAL MEDICINE

## 2024-05-15 ASSESSMENT — PAIN SCALES - GENERAL: PAINLEVEL: NO PAIN (0)

## 2024-05-15 NOTE — PROGRESS NOTES
"Preventive Care Visit  [facility]  Amanda Jules MD, Internal Medicine  May 15, 2024      Assessment & Plan     Routine general medical examination at a health care facility  Fasting lab work ordered. Up to date with mammogram and colonoscopy and pap smear.  - Lipid panel reflex to direct LDL Fasting; Future  - Comprehensive metabolic panel; Future  - CBC with platelets; Future  - TSH with free T4 reflex; Future    Gastroesophageal reflux disease without esophagitis  Has been using omeprazole at 20mg daily. Not feeling adequate symptom control.increase to 20 mg BID. Has a follow up with GI next month.    Migraine variant  As managed by neurology team. Now using nurtec.    Weight gain  Dietary recommendations discussed. Increase exercise.  Would like to go ahead with nutrition referral.  - Adult Nutrition  Referral; Future    Patient has been advised of split billing requirements and indicates understanding: Yes        BMI  Estimated body mass index is 29.59 kg/m  as calculated from the following:    Height as of this encounter: 1.791 m (5' 10.5\").    Weight as of this encounter: 94.9 kg (209 lb 3.2 oz).   Weight management plan: Discussed healthy diet and exercise guidelines    Counseling  Appropriate preventive services were discussed with this patient, including applicable screening as appropriate for fall prevention, nutrition, physical activity, Tobacco-use cessation, weight loss and cognition.  Checklist reviewing preventive services available has been given to the patient.  Reviewed patient's diet, addressing concerns and/or questions.           Rick Ye is a 48 year old, presenting for the following:  Physical        5/15/2024     4:02 PM   Additional Questions   Roomed by Yeti   Accompanied by Self        Health Care Directive  Patient does not have a Health Care Directive or Living Will: Discussed advance care planning with patient; however, patient declined at " this time.    HPI        2024   General Health   How would you rate your overall physical health? Good   Feel stress (tense, anxious, or unable to sleep) Very much   (!) STRESS CONCERN      2024   Nutrition   Three or more servings of calcium each day? Yes   Diet: Regular (no restrictions)   How many servings of fruit and vegetables per day? (!) 0-1   How many sweetened beverages each day? 0-1         2024   Exercise   Days per week of moderate/strenous exercise 5 days   Average minutes spent exercising at this level 30 min         2024   Social Factors   Frequency of gathering with friends or relatives Once a week   Worry food won't last until get money to buy more No   Food not last or not have enough money for food? No   Do you have housing?  Yes   Are you worried about losing your housing? No   Lack of transportation? No   Unable to get utilities (heat,electricity)? No         2024   Dental   Dentist two times every year? Yes         2024   TB Screening   Were you born outside of the [place]? No         Today's PHQ-2 Score:       2024     4:38 PM   PHQ-2 (  [employer])   Q1: Little interest or pleasure in doing things 0   Q2: Feeling down, depressed or hopeless 1   PHQ-2 Score 1   Q1: Little interest or pleasure in doing things Not at all   Q2: Feeling down, depressed or hopeless Several days   PHQ-2 Score 1           2024   Substance Use   Alcohol more than 3/day or more than 7/wk No   Do you use any other substances recreationally? No     Social History     Tobacco Use    Smoking status: Former     Current packs/day: 0.00     Average packs/day: 0.5 packs/day for 1 year (0.5 ttl pk-yrs)     Types: Cigarettes     Start date: 2005     Quit date: 2006     Years since quittin.4    Smokeless tobacco: Never    Tobacco comments:     Started in    Vaping Use    Vaping status: Never Used   Substance Use Topics    Alcohol use: Yes     Comment: 1 drink a month    Drug use:  No             5/9/2024   Breast Cancer Screening   Family history of breast, colon, or ovarian cancer? Yes         5/9/2024   LAST FHS-7 RESULTS   1st degree relative breast or ovarian cancer Unknown   Any relative bilateral breast cancer Yes   Any male have breast cancer No   Any ONE woman have BOTH breast AND ovarian cancer No   Any woman with breast cancer before 50yrs Yes   2 or more relatives with breast AND/OR ovarian cancer Yes   2 or more relatives with breast AND/OR bowel cancer Yes        Mammogram Screening - Mammogram every 1-2 years updated in Health Maintenance based on mutual decision making        5/9/2024   STI Screening   New sexual partner(s) since last STI/HIV test? No     History of abnormal Pap smear: No - age 30- 64 PAP with HPV every 5 years recommended        Latest Ref Rng & Units 11/20/2019     5:10 PM 11/20/2019     5:00 PM 11/7/2016     4:53 PM   PAP / HPV   PAP (Historical)  NIL      HPV 16 DNA NEG^Negative  Negative  Negative    HPV 18 DNA NEG^Negative  Negative  Negative    Other HR HPV NEG^Negative  Negative  Negative      ASCVD Risk   The 10-year ASCVD risk score (Sonia ALBARRAN, et al., 2019) is: 0.8%    Values used to calculate the score:      Age: 48 years      Sex: Female      Is Non- : No      Diabetic: No      Tobacco smoker: No      Systolic Blood Pressure: 124 mmHg      Is BP treated: No      HDL Cholesterol: 56 mg/dL      Total Cholesterol: 204 mg/dL        5/9/2024   Contraception/Family Planning   Questions about contraception or family planning No        Reviewed and updated as needed this visit by Provider                          Review of Systems  Constitutional, HEENT, cardiovascular, pulmonary, gi and gu systems are negative, except as otherwise noted.     Objective    Exam  /76 (BP Location: Right arm, Patient Position: Sitting, Cuff Size: Adult Regular)   Pulse 114   Temp 98.1  F (36.7  C) (Tympanic)   Resp 16   Ht 1.791 m  "(5' 10.5\")   Wt 94.9 kg (209 lb 3.2 oz)   SpO2 99%   BMI 29.59 kg/m     Estimated body mass index is 29.59 kg/m  as calculated from the following:    Height as of this encounter: 1.791 m (5' 10.5\").    Weight as of this encounter: 94.9 kg (209 lb 3.2 oz).    Physical Exam  GENERAL: alert and no distress  EYES: Eyes grossly normal to inspection, PERRL and conjunctivae and sclerae normal  HENT: ear canals and TM's normal, nose and mouth without ulcers or lesions  RESP: lungs clear to auscultation - no rales, rhonchi or wheezes  CV: regular rate and rhythm, normal S1 S2  ABDOMEN: soft, nontender, no hepatosplenomegaly, no masses  MS: no gross musculoskeletal defects noted, no edema  SKIN: no suspicious lesions or rashes  NEURO: Normal strength and tone, mentation intact and speech normal  PSYCH: mentation appears normal, affect normal/bright        Signed Electronically by: Amanda Jules MD    "

## 2024-05-26 ENCOUNTER — OFFICE VISIT (OUTPATIENT)
Dept: URGENT CARE | Facility: URGENT CARE | Age: 49
End: 2024-05-26
Payer: COMMERCIAL

## 2024-05-26 VITALS
RESPIRATION RATE: 16 BRPM | BODY MASS INDEX: 29.52 KG/M2 | TEMPERATURE: 97.6 F | DIASTOLIC BLOOD PRESSURE: 81 MMHG | OXYGEN SATURATION: 99 % | HEART RATE: 87 BPM | SYSTOLIC BLOOD PRESSURE: 126 MMHG | WEIGHT: 208.7 LBS

## 2024-05-26 DIAGNOSIS — R07.0 THROAT PAIN: Primary | ICD-10-CM

## 2024-05-26 LAB
DEPRECATED S PYO AG THROAT QL EIA: NEGATIVE
GROUP A STREP BY PCR: NOT DETECTED

## 2024-05-26 PROCEDURE — 99213 OFFICE O/P EST LOW 20 MIN: CPT | Performed by: PHYSICIAN ASSISTANT

## 2024-05-26 PROCEDURE — 87651 STREP A DNA AMP PROBE: CPT | Performed by: PHYSICIAN ASSISTANT

## 2024-05-26 NOTE — PROGRESS NOTES
Assessment & Plan     Throat pain  Rapid strep is negative today.  No evidence of peritonsillar abscess.  We discussed viral pharyngitis versus allergies.  Throat culture is pending.  Supportive care measures advised: tylenol, motrin, throat lozenges, oral antihistamines.  We will communicate any positive finding on the throat culture result.  Follow-up if any worsening symptoms.  Patient understands and agrees with the plan.    - Streptococcus A Rapid Screen w/Reflex to PCR - Clinic Collect  - Group A Streptococcus PCR Throat Swab       Return in about 1 week (around 6/2/2024) for Symptoms failing to improve.    Rosalind Levine PA-C  [facility] ANTHONY Ye is a 48 year old female who presents to clinic today for the following health issues:  Chief Complaint   Patient presents with    Throat Pain     49 yo F presents with the following complaint throat discomfort onset Wednesday some post nasal drip, voice changes  tx- ibuprofen , salt water       HPI    Patient is presenting to urgent care today with complaint of throat pain, hoarse voice and postnasal drip.  Persistent symptoms for the past 4 to 5 days.  No fevers or chills.  Treatment tried: Ibuprofen, salt water gargles.      Review of Systems  Constitutional, HEENT, cardiovascular, pulmonary, GI, , musculoskeletal, neuro, skin, endocrine and psych systems are negative, except as otherwise noted.      Objective    /81   Pulse 87   Temp 97.6  F (36.4  C)   Resp 16   Wt 94.7 kg (208 lb 11.2 oz)   SpO2 99%   BMI 29.52 kg/m    Physical Exam   GENERAL: alert and no distress  HENT: ear canals and TM's normal,  mouth without ulcers or lesions, throat is moist and pink, uvula is midline  RESP: lungs clear to auscultation - no rales, rhonchi or wheezes  CV: regular rate and rhythm, normal S1 S2  MS: no gross musculoskeletal defects noted, no edema  SKIN: no suspicious lesions or rashes    Results for orders placed  or performed in visit on 05/26/24 (from the past 24 hour(s))   Streptococcus A Rapid Screen w/Reflex to PCR - Clinic Collect    Specimen: Throat; Swab   Result Value Ref Range    Group A Strep antigen Negative Negative

## 2024-06-17 ENCOUNTER — LAB (OUTPATIENT)
Dept: LAB | Facility: CLINIC | Age: 49
End: 2024-06-17
Payer: COMMERCIAL

## 2024-06-17 DIAGNOSIS — Z00.00 ROUTINE GENERAL MEDICAL EXAMINATION AT A HEALTH CARE FACILITY: ICD-10-CM

## 2024-06-17 LAB
ERYTHROCYTE [DISTWIDTH] IN BLOOD BY AUTOMATED COUNT: 12.7 % (ref 10–15)
HCT VFR BLD AUTO: 39.3 % (ref 35–47)
HGB BLD-MCNC: 13.7 G/DL (ref 11.7–15.7)
MCH RBC QN AUTO: 30.6 PG (ref 26.5–33)
MCHC RBC AUTO-ENTMCNC: 34.9 G/DL (ref 31.5–36.5)
MCV RBC AUTO: 88 FL (ref 78–100)
PLATELET # BLD AUTO: 401 10E3/UL (ref 150–450)
RBC # BLD AUTO: 4.47 10E6/UL (ref 3.8–5.2)
WBC # BLD AUTO: 6.1 10E3/UL (ref 4–11)

## 2024-06-17 PROCEDURE — 84443 ASSAY THYROID STIM HORMONE: CPT | Performed by: INTERNAL MEDICINE

## 2024-06-17 PROCEDURE — 80061 LIPID PANEL: CPT | Performed by: INTERNAL MEDICINE

## 2024-06-17 PROCEDURE — 85027 COMPLETE CBC AUTOMATED: CPT | Performed by: INTERNAL MEDICINE

## 2024-06-17 PROCEDURE — 36415 COLL VENOUS BLD VENIPUNCTURE: CPT | Performed by: INTERNAL MEDICINE

## 2024-06-17 PROCEDURE — 80053 COMPREHEN METABOLIC PANEL: CPT | Performed by: INTERNAL MEDICINE

## 2024-06-18 LAB
ALBUMIN SERPL BCG-MCNC: 4 G/DL (ref 3.5–5.2)
ALP SERPL-CCNC: 55 U/L (ref 40–150)
ALT SERPL W P-5'-P-CCNC: 30 U/L (ref 0–50)
ANION GAP SERPL CALCULATED.3IONS-SCNC: 9 MMOL/L (ref 7–15)
AST SERPL W P-5'-P-CCNC: 23 U/L (ref 0–45)
BILIRUB SERPL-MCNC: 0.5 MG/DL
BUN SERPL-MCNC: 9.8 MG/DL (ref 6–20)
CALCIUM SERPL-MCNC: 8.8 MG/DL (ref 8.6–10)
CHLORIDE SERPL-SCNC: 107 MMOL/L (ref 98–107)
CHOLEST SERPL-MCNC: 183 MG/DL
CREAT SERPL-MCNC: 0.77 MG/DL (ref 0.51–0.95)
DEPRECATED HCO3 PLAS-SCNC: 24 MMOL/L (ref 22–29)
EGFRCR SERPLBLD CKD-EPI 2021: >90 ML/MIN/1.73M2
FASTING STATUS PATIENT QL REPORTED: YES
FASTING STATUS PATIENT QL REPORTED: YES
GLUCOSE SERPL-MCNC: 79 MG/DL (ref 70–99)
HDLC SERPL-MCNC: 46 MG/DL
LDLC SERPL CALC-MCNC: 110 MG/DL
NONHDLC SERPL-MCNC: 137 MG/DL
POTASSIUM SERPL-SCNC: 4.2 MMOL/L (ref 3.4–5.3)
PROT SERPL-MCNC: 6.9 G/DL (ref 6.4–8.3)
SODIUM SERPL-SCNC: 140 MMOL/L (ref 135–145)
TRIGL SERPL-MCNC: 135 MG/DL
TSH SERPL DL<=0.005 MIU/L-ACNC: 1.75 UIU/ML (ref 0.3–4.2)

## 2024-07-07 ENCOUNTER — OFFICE VISIT (OUTPATIENT)
Dept: URGENT CARE | Facility: URGENT CARE | Age: 49
End: 2024-07-07
Payer: COMMERCIAL

## 2024-07-07 VITALS
BODY MASS INDEX: 29.63 KG/M2 | DIASTOLIC BLOOD PRESSURE: 79 MMHG | HEIGHT: 70 IN | TEMPERATURE: 97.4 F | OXYGEN SATURATION: 97 % | HEART RATE: 88 BPM | SYSTOLIC BLOOD PRESSURE: 115 MMHG | WEIGHT: 207 LBS

## 2024-07-07 DIAGNOSIS — L73.9 FOLLICULITIS: Primary | ICD-10-CM

## 2024-07-07 PROCEDURE — 99213 OFFICE O/P EST LOW 20 MIN: CPT | Performed by: FAMILY MEDICINE

## 2024-07-07 RX ORDER — CEPHALEXIN 500 MG/1
500 CAPSULE ORAL 3 TIMES DAILY
Qty: 30 CAPSULE | Refills: 0 | Status: SHIPPED | OUTPATIENT
Start: 2024-07-07 | End: 2024-07-17

## 2024-07-07 NOTE — PROGRESS NOTES
ASSESSMENT/ PLAN:  Folliculitis     - cephALEXin (KEFLEX) 500 MG capsule; Take 1 capsule (500 mg) by mouth 3 times daily for 10 days      patient to monitor for signs or symptoms of worsening/ spreading infection.  Go to Urgent care or Emergency Department if worsening fevers/chills and/or spreading infection.  Warm, moist packs or towels can be applied to the region to aid in resolution of the infection.  Use Tylenol or ibuprofen for pain or fever.     ------------------------------------------------------------------------------------------------------------------------------------------    SUBJECTIVE:   Chief Complaint   Patient presents with    Urgent Care     Painful bump in rt axilla x 4d.     Cassi Ye is a 48 year old female who presents for evaluation of and area of redness, tenderness, swelling and warmth of the skin that developed on the  right, medial  axilla.  Patient has had lump  pain, and tenderness for 4 days.    Precipitating event was unknown, -  single spot.    Therapies tried: none.    Past Medical History:   Diagnosis Date    Gastroesophageal reflux disease     Pain in joint, pelvic region and thigh     Variants of migraine, not elsewhere classified, without mention of intractable migraine without mention of status migrainosus     Nasal spray     Patient Active Problem List   Diagnosis    Migraine variant    Blood type A+    GERD (gastroesophageal reflux disease)    CARDIOVASCULAR SCREENING; LDL GOAL LESS THAN 160       ALLERGIES:  Seasonal allergies and Penicillins    Current Outpatient Medications   Medication Sig Dispense Refill    cephALEXin (KEFLEX) 500 MG capsule Take 1 capsule (500 mg) by mouth 3 times daily for 10 days 30 capsule 0    JOLESSA 0.15-0.03 MG tablet TAKE 1 TABLET DAILY 91 tablet 3    magnesium 250 MG tablet Take 1 tablet by mouth 2 times daily.      MULTIVITAMINS OR TABS 1 TABLET DAILY      omeprazole (PRILOSEC OTC) 20 MG EC tablet Take 1 tablet by mouth daily.       "Riboflavin (VITAMIN B-2 PO) Take 200 mg by mouth 2 times daily.      rimegepant (NURTEC) 75 MG ODT tablet       VITAMIN D, CHOLECALCIFEROL, PO Take 1,000 Units by mouth daily      zonisamide (ZONEGRAN) 100 MG capsule Take 1 capsule by mouth At Bedtime       Current Facility-Administered Medications   Medication Dose Route Frequency Provider Last Rate Last Admin    lidocaine 1 % injection 1 mL  1 mL   Yeo, Albert, MD   1 mL at 19    methylPREDNISolone (DEPO-MEDROL) injection 40 mg  40 mg   Yeo, Albert, MD   40 mg at 19 0948       Social History     Tobacco Use    Smoking status: Former     Current packs/day: 0.00     Average packs/day: 0.5 packs/day for 1 year (0.5 ttl pk-yrs)     Types: Cigarettes     Start date: 2005     Quit date: 2006     Years since quittin.6    Smokeless tobacco: Never    Tobacco comments:     Started in    Substance Use Topics    Alcohol use: Yes     Comment: 1 drink a month       Family History   Problem Relation Age of Onset    Family History Negative Mother     Asthma Mother         2015    Thyroid Disease Mother     Lipids Father     Neurologic Disorder Father         migraines    Coronary Artery Disease Father     Hyperlipidemia Father     Cerebrovascular Disease Father         TIA    Anxiety Disorder Father     Breast Cancer Paternal Grandmother     Family History Negative Brother     Breast Cancer Paternal Aunt     Cancer Paternal Aunt         ovarian cancer    Blood Disease Other         paternal uncle with leukemia    Colon Cancer No family hx of        ROS:  CONSTITUTIONAL:NEGATIVE for fever, chills,    EYES: NEGATIVE for vision changes or irritation  ENT/MOUTH: NEGATIVE for ear, mouth and throat problems  RESP:NEGATIVE for significant cough or SOB    OBJECTIVE:  /79   Pulse 88   Temp 97.4  F (36.3  C) (Tympanic)   Ht 1.778 m (5' 10\")   Wt 93.9 kg (207 lb)   SpO2 97%   BMI 29.70 kg/m      SKIN: area involved is 0.5 cm by 0.5 " cm on the right,  medial axilla- .   The skin appear erythematous, moderately swollen, with tenderness and warmth to the touch.       GENERAL:  Alert, mild distress  EYES: EOMI,   conjunctiva clear  HENT: External ears with no swelling or lesions   Nose and lips without  Swelling, ulcers, erythema or lesions  NECK: normal pain free ROM  RESP: no labored respirations, no tachypnea  EXTREMITIES:   Full ROM without expression of pain or limitation x 4 extremities  NEURO: Normal strength and tone, ambulation without difficulty,   normal speech and mentation

## 2024-07-09 ENCOUNTER — HOSPITAL ENCOUNTER (OUTPATIENT)
Dept: NUTRITION | Facility: CLINIC | Age: 49
Discharge: HOME | End: 2024-07-09
Attending: INTERNAL MEDICINE | Admitting: INTERNAL MEDICINE
Payer: COMMERCIAL

## 2024-07-09 DIAGNOSIS — R63.5 WEIGHT GAIN: ICD-10-CM

## 2024-07-09 PROCEDURE — 97802 MEDICAL NUTRITION INDIV IN: CPT | Mod: GT,95 | Performed by: DIETITIAN, REGISTERED

## 2024-07-09 NOTE — PROGRESS NOTES
"[facility]  Medical Nutrition Therapy    Visit Type: Initial Assessment    Cassi Ye, 48 year old referred by Amanda Jules MD for MNT related to R63.5 (ICD-10-CM) - Weight gain      Virtual Visit Details    Type of service:  Video Visit     Originating Location (pt. Location): Home    Distant Location (provider location):  On-site  Platform used for Video Visit: Optasite         Nutrition Assessment:  Anthropometrics  Height:   Ht Readings from Last 1 Encounters:   07/07/24 1.778 m (5' 10\")         BMI:  29.7   Weight Status:  Overweight BMI 25-29.9   Weight:   Wt Readings from Last 1 Encounters:   07/07/24 93.9 kg (207 lb)       UBW: 185 lb      IBW:  68 kg (150 lb) IBW %: 138%        Weight History:   Wt Readings from Last 20 Encounters:   07/07/24 93.9 kg (207 lb)   05/26/24 94.7 kg (208 lb 11.2 oz)   05/15/24 94.9 kg (209 lb 3.2 oz)   05/06/24 93.9 kg (207 lb)   09/05/23 93 kg (205 lb)   05/10/23 91.6 kg (202 lb)   09/02/22 91.3 kg (201 lb 3.2 oz)   02/01/22 96.1 kg (211 lb 12.8 oz)   11/30/20 86.4 kg (190 lb 8 oz)   11/20/19 87.7 kg (193 lb 4.8 oz)   06/04/19 85.3 kg (188 lb)   05/01/19 85.3 kg (188 lb)   04/15/19 85.3 kg (188 lb)   04/11/19 85.9 kg (189 lb 6.4 oz)   04/10/19 84.4 kg (186 lb)   03/15/19 84.4 kg (186 lb)   01/21/19 83.9 kg (185 lb)   11/14/18 85 kg (187 lb 8 oz)   11/13/17 85.9 kg (189 lb 6.4 oz)   08/21/17 86.2 kg (190 lb)   -Wt gain of 17 lb over the past 4 years. However, weight has ranged from 190-211 lb over this time frame.     Goal Weight:   -Patient would be happy back at 180 lb for now and then more possibly in the future.      Nutrition History    PMH:   Patient Active Problem List   Diagnosis    Migraine variant    Blood type A+    GERD (gastroesophageal reflux disease)    CARDIOVASCULAR SCREENING; LDL GOAL LESS THAN 160      -Patient reports that over the past 5 years, she has been gaining weight. She's made unhealthy food choices. Even when trying to make " healthy choices, she can't seem to lose any weight. She exercises every day as well.   -Did Weight Watchers for a while and it was helpful but didn't stick with it.   -Has two teenagers, who are picky eaters. Tough to figure out what to cook for everyone at the house.     Labs:   Recent Labs   Lab Test 06/17/24  0858 02/01/22  0743   CHOL 183 204*   HDL 46* 56   * 117*   TRIG 135 154*      Meds:   Current Outpatient Medications   Medication Instructions    cephALEXin (KEFLEX) 500 mg, Oral, 3 TIMES DAILY    JOLESSA 0.15-0.03 MG tablet 1 tablet, Oral, DAILY    magnesium 250 MG tablet 1 tablet, 2 TIMES DAILY    MULTIVITAMINS OR TABS 1 TABLET DAILY    omeprazole (PRILOSEC OTC) 20 MG EC tablet 1 tablet, DAILY    Riboflavin (VITAMIN B-2 PO) 200 mg, 2 TIMES DAILY    rimegepant (NURTEC) 75 MG ODT tablet     VITAMIN D, CHOLECALCIFEROL, PO 1,000 Units, Oral, DAILY    zonisamide (ZONEGRAN) 100 MG capsule 1 capsule, Oral, AT BEDTIME        Supplements: reviewed      Social/Environmental:   -average sleep per night: not discussed  -level of stress: not discussed      Food Record  Breakfast: 7:30 am: steel cut oats (milk) or Cheerios dry or Chobani smoothie   Snack: 10:00 am: frozen blueberries or other fruits such as peaches strawberries   Lunch: 12:00 pm: chicken salad (homemade with shredded chicken, [place]) or a lettuce salad (cherry tomatoes, cucs, italian dressing, carrots) or cheese (thiago sean), crackers (Club), meat (salami)  Snack: 2:30 pm: salty snack such as chips (small bowl) or crackers (6)  Dinner: 6:00 pm: pizza 2 slices OR chicken breasts grilled with steamed corn   Snack: fudge pop or licorice   Beverages: Water all day, doesn't drink soda, drinks green tea, 1 cup decaff coffee, no juice  -Take-out once per week- local restaurant and ate chicken caesar wrap with fries (eats half the meal)  -No salt use  -Butter use at table and cooking     Nutritional Details:   -Food allergies: tree nuts and peanuts    -Food intolerances: milk, ok in small amounts   -Food sensitivities: none   -GI concerns:  none, GERD  -Appetite: Good  -Pace of eating: fast  -Role of cooking: self   -Role of food shopping: self       Physical Activity:  Exercises almost every day over the past few years. Walks on the treadmill or outside with dog with 30-45 min. Started yoga in the fall but fell down the stairs and hurt ankle.     ASSESSED MALNUTRITION STATUS  % Weight Loss:  None noted  % Intake:  No decreased intake noted  Subcutaneous Fat Loss:  None observed  Loss of Muscle Mass:  None observed  Fluid Retention:  None noted    Malnutrition Diagnosis:  Patient does not meet two of the above criteria necessary for diagnosing malnutrition        Nutrition Diagnosis:  Excessive energy intake related to eating past full-point as evidenced by report of difficulty listening to fullness cues, usual dietary intake estimated to meet approximately 130% calorie needs, BMI 29.7, and 138% IBW.         Nutrition Intervention:  Nutrition Prescription Summary: MNT for Weight Management      Nutrition Education (Content):  -Educated pt on using MyPlate for meal planning and discussed portion sizes   -Discussed recommended and not recommended foods (choosing WGs, lean meats, low-fat dairy, fresh fruits & veggies, unsat fats, and limiting high-sodium and refined sugar foods)  -Educated pt on metabolism and used the fire analogy; talked about the importance of consuming consistent meals/snacks throughout the day and listening to hunger/fullness cues (handout provided)  -Discussed healthy snack options- protein+complex CHO  -Educated pt on reading food labels  -Discussed intuitive eating   -Encouraged pt to drink more water throughout the day and explained the importance of hydration.   -Encouraged pt to increase daily PA- include cardiovascular activities w/ultimate goal of 60 min per day     Emailed/mailed information discussed including nutrition handouts to  patient.       Nutrition Prescription: Macronutrient and Micronutrient details   Dosing weight: Current wt (94 kg) for energy and fluids, IBW (68 kg) for protein  Energy: 4225-1533 kcals/day ([place] [employer] Jeor)    Justification:  (overweight, to promote a 1 lb wt loss per week)   Protein: 68-82 g Pro/day (1-1.2 g pro/Kg)    Justification:  (overweight )   Fluid: 7244-9727 mL/day   (1 mL/Kcal)     Justification:  (overweight)   Fiber:     Women (19-50 years): 25 grams per day          Carbohydrate: 45-65% kcal   <25 g added sugar/day       Fat: 20-35% kcal  <7% kcal from saturated fat   Micronutrient: DRI  <2,300 mg sodium/day            Vitamin and Mineral Supplements: n/a       Patient Engagement:   Assessed learning needs and learning preference: Yes.  Teaching Method(s) used: Explanation  Video    Nutrition Education (Application):  a)  Discussed current eating plans / recommended alternative food choices    b)  Patient verbalizes understanding of diet by asking questions, goal setting      Anticipate >75% compliance   Stage of Change:  action      Nutrition Goals:  1) Increase exercise, aiming for 45-60 min per day of walking   2) Pay attention to fullness cues, slow down the pace at meals, aim for 20 min to finish a well balanced meal (use My Plate)  3) Track calories and aim for 8493-0834 calories per day using Cronometer   4) Choose lean meats and low-fat dairy   5) Check weight once per week and goal is 1 lb wt loss per week       Nutrition Follow Up / Monitoring:   Weight, PO intake, PA, labs (lipid panel)      Nutrition Recommendations:  Patient to follow-up with RD in 8-12 weeks.  Patient has RD contact information to call/email if needed.      Start time: 10:00  End time: 11:00    Total Time Duration: 60 min      Gabriella Baltazar MS, RD, LD, [facility]  Clinical Dietitian  [phone]

## 2024-07-16 ENCOUNTER — MYC MEDICAL ADVICE (OUTPATIENT)
Dept: INTERNAL MEDICINE | Facility: CLINIC | Age: 49
End: 2024-07-16
Payer: COMMERCIAL

## 2024-07-17 ENCOUNTER — NURSE TRIAGE (OUTPATIENT)
Dept: FAMILY MEDICINE | Facility: CLINIC | Age: 49
End: 2024-07-17
Payer: COMMERCIAL

## 2024-07-17 NOTE — TELEPHONE ENCOUNTER
"Nurse Triage SBAR    Is this a 2nd Level Triage? NO    Situation: R armpit lump that has ont resolved after 10 days of antibiotics    Background: Per regt note: \"I was seen in urgent care July 7th for what they said was infected hair follicle in my armpit and given antibiotics and have been doing warm compresses and washing with hibiclens but it has not completely resolved. I scheduled an evisit for Friday but wasn t sure if I needed to address this ongoing issue as the next in person appointment was September. Is this something that can be addressed here? \"    Assessment: Finished antibiotic today, not painful or as raised but bump is still there. No fever, has not gone away.    Protocol Recommended Disposition:   See in Office Within 3 Days    Recommendation:   Appointments in Next Year      Jul 19, 2024 7:30 AM  (Arrive by 7:10 AM)  Provider Visit with ARTIE Muhammad CNP  [facility] ([facility] ) [phone]                  Does the patient meet one of the following criteria for ADS visit consideration? No  Reason for Disposition   Small swelling or lump present > 1 week    Additional Information   Negative: Sounds like a life-threatening emergency to the triager   Negative: Hernia suspected (bulge in groin or abdomen) and painful or vomiting   Negative: Swollen lump in groin and pulsating (like heartbeat)   Negative: Patient sounds very sick or weak to the triager   Negative: SEVERE pain (e.g., excruciating)   Negative: Swelling is painful to touch AND fever   Negative: Swelling is red and fever   Negative: Swelling is red and size > 2 inches (5.0 cm)   Negative: Swelling is painful to touch and no fever   Negative: Looks like a boil, infected sore, deep ulcer or other infected rash    Protocols used: Skin Lump or Localized Swelling-A-OH    "

## 2024-07-19 ENCOUNTER — OFFICE VISIT (OUTPATIENT)
Dept: INTERNAL MEDICINE | Facility: CLINIC | Age: 49
End: 2024-07-19
Payer: COMMERCIAL

## 2024-07-19 VITALS
DIASTOLIC BLOOD PRESSURE: 79 MMHG | RESPIRATION RATE: 14 BRPM | HEART RATE: 71 BPM | SYSTOLIC BLOOD PRESSURE: 114 MMHG | OXYGEN SATURATION: 99 % | TEMPERATURE: 97 F | HEIGHT: 70 IN | WEIGHT: 210.8 LBS | BODY MASS INDEX: 30.18 KG/M2

## 2024-07-19 DIAGNOSIS — L73.9 FOLLICULITIS: Primary | ICD-10-CM

## 2024-07-19 PROCEDURE — 99213 OFFICE O/P EST LOW 20 MIN: CPT

## 2024-07-19 RX ORDER — CEFDINIR 300 MG/1
300 CAPSULE ORAL 2 TIMES DAILY
Qty: 14 CAPSULE | Refills: 0 | Status: SHIPPED | OUTPATIENT
Start: 2024-07-19 | End: 2024-07-26

## 2024-07-19 ASSESSMENT — PAIN SCALES - GENERAL: PAINLEVEL: NO PAIN (0)

## 2024-07-19 NOTE — PROGRESS NOTES
Assessment & Plan     (L73.9) Folliculitis  (primary encounter diagnosis)  Comment: Patient presents for a painful lump on the inside of the right armpit. Patient recently was treated with cefdinir in May which took it away. Beginning of July she was given cephalexin with improvement but not completely gone. Clinical picture suggests possibly folliculitis vs cyst. If no improvement with this antibiotic, will order ultrasound  Plan: cefdinir (OMNICEF) 300 MG capsule        Medication sent to pharmacy.                 Rick Ye is a 48 year old, presenting for the following health issues:  Patient presented to the clinic on May 6th 2024 for the same lump in her right armpit. She was given oral antibiotics however, the lump is still there although much smaller. At that time it was not necessary to do an I&D. She was placed on cefdinir.   On July 2nd she got it again and she took the antibiotic and it still is there     Today: It is still there and slightly tender. She has been doing warm compresses and massaging it but it hasn't taken care of it. She does have an appointment with dermatology in September.     Mass (Infected hair in right axillary area, abx helped but it's still there)        7/19/2024     7:27 AM   Additional Questions   Roomed by Sherice TRUONG   Accompanied by n/a     Cooper    History of Present Illness       Reason for visit:  Lump/bump right armpit    She eats 2-3 servings of fruits and vegetables daily.She consumes 0 sweetened beverage(s) daily.She exercises with enough effort to increase her heart rate 30 to 60 minutes per day.  She exercises with enough effort to increase her heart rate 5 days per week.   She is taking medications regularly.                 Review of Systems  Constitutional, HEENT, cardiovascular, pulmonary, gi and gu systems are negative, except as otherwise noted.      Objective    /79 (BP Location: Left arm, Cuff Size: Adult Regular)   Pulse 71   Temp 97  F  "(36.1  C) (Tympanic)   Resp 14   Ht 1.778 m (5' 10\")   Wt 95.6 kg (210 lb 12.8 oz)   SpO2 99%   BMI 30.25 kg/m    Body mass index is 30.25 kg/m .  Physical Exam   GENERAL: alert and no distress  SKIN: no suspicious lesions or rashes and Small pea size lump on right axilla             Signed Electronically by: ARTIE Muhammad CNP    "

## 2024-07-29 ENCOUNTER — TRANSFERRED RECORDS (OUTPATIENT)
Dept: HEALTH INFORMATION MANAGEMENT | Facility: CLINIC | Age: 49
End: 2024-07-29
Payer: COMMERCIAL

## 2024-08-22 ENCOUNTER — E-VISIT (OUTPATIENT)
Dept: URGENT CARE | Facility: CLINIC | Age: 49
End: 2024-08-22
Payer: COMMERCIAL

## 2024-08-22 DIAGNOSIS — N39.0 ACUTE UTI (URINARY TRACT INFECTION): Primary | ICD-10-CM

## 2024-08-22 PROCEDURE — 99422 OL DIG E/M SVC 11-20 MIN: CPT | Performed by: PHYSICIAN ASSISTANT

## 2024-08-22 RX ORDER — NITROFURANTOIN 25; 75 MG/1; MG/1
100 CAPSULE ORAL 2 TIMES DAILY
Qty: 10 CAPSULE | Refills: 0 | Status: SHIPPED | OUTPATIENT
Start: 2024-08-22 | End: 2024-08-27

## 2024-08-22 NOTE — PATIENT INSTRUCTIONS
Dear Cassi Ye    After reviewing your responses, I've been able to diagnose you with a urinary tract infection, which is a common infection of the bladder with bacteria.  This is not a sexually transmitted infection, though urinating immediately after intercourse can help prevent infections.  Drinking lots of fluids is also helpful to clear your current infection and prevent the next one.      I have sent a prescription for antibiotics to your pharmacy to treat this infection.    It is important that you take all of your prescribed medication even if your symptoms are improving after a few doses.  Taking all of your medicine helps prevent the symptoms from returning.     If your symptoms worsen, you develop pain in your back or stomach, develop fevers, or are not improving in 5 days, please contact your primary care provider for an appointment or visit any of our convenient Walk-in or Urgent Care Centers to be seen, which can be found on our website here.    Thanks again for choosing us as your health care partner,    Naima Davye PA-C  Urinary Tract Infection (UTI) in Women: Care Instructions  Overview     A urinary tract infection (UTI) is an infection caused by bacteria. It can happen anywhere in the urinary tract. A UTI can happen in the:  Kidneys.  Ureters, the tubes that connect the kidneys to the bladder.  Bladder.  Urethra, where the urine comes out.  Most UTIs are bladder infections. They often cause pain or burning when you urinate.  Most UTIs can be cured with antibiotics. If you are prescribed antibiotics, be sure to complete your treatment so that the infection does not get worse.  Follow-up care is a key part of your treatment and safety. Be sure to make and go to all appointments, and call your doctor if you are having problems. It's also a good idea to know your test results and keep a list of the medicines you take.  How can you care for yourself at home?  Take your antibiotics as  "directed. Do not stop taking them just because you feel better. You need to take the full course of antibiotics.  Drink extra water and other fluids for the next day or two. This will help make the urine less concentrated and help wash out the bacteria that are causing the infection. (If you have kidney, heart, or liver disease and have to limit fluids, talk with your doctor before you increase the amount of fluids you drink.)  Avoid drinks that are carbonated or have caffeine. They can irritate the bladder.  Urinate often. Try to empty your bladder each time.  To relieve pain, take a hot bath or lay a heating pad set on low over your lower belly or genital area. Never go to sleep with a heating pad in place.  To prevent UTIs  Drink plenty of water each day. This helps you urinate often, which clears bacteria from your system. (If you have kidney, heart, or liver disease and have to limit fluids, talk with your doctor before you increase the amount of fluids you drink.)  Urinate when you need to.  If you are sexually active, urinate right after you have sex.  Change sanitary pads often.  Avoid douches, bubble baths, feminine hygiene sprays, and other feminine hygiene products that have deodorants.  After going to the bathroom, wipe from front to back.  When should you call for help?   Call your doctor now or seek immediate medical care if:    You have new or worse fever, chills, nausea, or vomiting.     You have new pain in your back just below your rib cage. This is called flank pain.     There is new blood or pus in your urine.     You have any problems with your antibiotic medicine.   Watch closely for changes in your health, and be sure to contact your doctor if:    You are not getting better after taking an antibiotic for 2 days.     Your symptoms go away but then come back.   Where can you learn more?  Go to https://www.healthwise.net/patiented  Enter K848 in the search box to learn more about \"Urinary Tract " "Infection (UTI) in Women: Care Instructions.\"  Current as of: November 15, 2023               Content Version: 14.0    6446-7844 [employer].   Care instructions adapted under license by your healthcare professional. If you have questions about a medical condition or this instruction, always ask your healthcare professional. [employer] disclaims any warranty or liability for your use of this information.      "

## 2024-09-28 ENCOUNTER — MYC MEDICAL ADVICE (OUTPATIENT)
Dept: INTERNAL MEDICINE | Facility: CLINIC | Age: 49
End: 2024-09-28
Payer: COMMERCIAL

## 2024-11-06 DIAGNOSIS — Z30.09 GENERAL COUNSELING FOR PRESCRIPTION OF ORAL CONTRACEPTIVES: ICD-10-CM

## 2024-11-07 RX ORDER — LEVONORGESTREL / ETHINYL ESTRADIOL 0.15-0.03
1 KIT ORAL DAILY
Qty: 91 TABLET | Refills: 0 | Status: SHIPPED | OUTPATIENT
Start: 2024-11-07

## 2024-11-15 ENCOUNTER — TRANSFERRED RECORDS (OUTPATIENT)
Dept: HEALTH INFORMATION MANAGEMENT | Facility: CLINIC | Age: 49
End: 2024-11-15
Payer: COMMERCIAL

## 2024-12-16 ENCOUNTER — TRANSFERRED RECORDS (OUTPATIENT)
Dept: HEALTH INFORMATION MANAGEMENT | Facility: CLINIC | Age: 49
End: 2024-12-16
Payer: COMMERCIAL

## 2024-12-23 ENCOUNTER — OFFICE VISIT (OUTPATIENT)
Dept: FAMILY MEDICINE | Facility: CLINIC | Age: 49
End: 2024-12-23
Payer: COMMERCIAL

## 2024-12-23 VITALS
RESPIRATION RATE: 18 BRPM | WEIGHT: 210 LBS | HEART RATE: 111 BPM | DIASTOLIC BLOOD PRESSURE: 83 MMHG | OXYGEN SATURATION: 100 % | SYSTOLIC BLOOD PRESSURE: 131 MMHG | HEIGHT: 70 IN | BODY MASS INDEX: 30.06 KG/M2 | TEMPERATURE: 97.6 F

## 2024-12-23 DIAGNOSIS — M25.572 PAIN IN JOINT INVOLVING ANKLE AND FOOT, LEFT: ICD-10-CM

## 2024-12-23 DIAGNOSIS — Z01.818 PREOP GENERAL PHYSICAL EXAM: Primary | ICD-10-CM

## 2024-12-23 PROCEDURE — 99214 OFFICE O/P EST MOD 30 MIN: CPT

## 2024-12-23 PROCEDURE — G2211 COMPLEX E/M VISIT ADD ON: HCPCS

## 2024-12-23 NOTE — PROGRESS NOTES
Preoperative Evaluation  [facility]  [address] [address]  Phone: [phone]  Fax: [phone]  Primary Provider: Physician No Ref-Primary  Pre-op Performing Provider: Chase Rey DO  Dec 23, 2024             12/18/2024   Surgical Information   What procedure is being done? Left ankle   Facility or [facility] where procedure[facility]   Who is doing the procedure / surgery? Dr miranda   Date of surgery / procedure: 12/26/2024   Time of surgery / procedure: 9am   Where do you plan to recover after surgery? at home with family     Fax number for surgical facility:[phone]    Assessment & Plan     The proposed surgical procedure is considered LOW risk.    Preop general physical exam  Pain in joint involving ankle and foot, left  Debridement of the left ankle following an injury in October/November 2023  Failed conservative treatment of medicine, physical therapy, injections  Surgery is scheduled for 12/26/2024  No family or personal history of problems with anesthesia  Point tenderness of the left distal lateral tibia on physical exam today  Patient's medications cannot be continued  Should not take Advil or ibuprofen prior to The surgery for 7 days.  I do not foresee any complications for this patient to go forward with surgery  Please f follow surgeon recommendations for physical therapy and maintenance after to prevent any long-term sequela of surgery  Please follow-up with your primary care provider for Pap smear as you are overdue.   The longitudinal plan of care for the diagnosis(es)/condition(s) as documented were addressed during this visit. Due to the added complexity in care, I will continue to support Cassi in the subsequent management and with ongoing continuity of care.        - No identified additional risk factors other than previously addressed         Recommendation  Approval given to proceed with proposed  procedure, without further diagnostic evaluation.    Rick Ye is a 49 year old, presenting for the following:  Pre-Op Exam (Surgery on left ankle on 12/26/2024 at [facility] in [place] with Dr. Post fax [phone])          12/23/2024     2:53 PM   Additional Questions   Roomed by MARKIE AMES     HPI related to upcoming procedure:     At [facility] scheduled for 12/26 Halloween 2023 high ankle sprain. Failed PT and pain medications as well as injections  Continued pain  Previous MRI showed an evulsion fracture so they are wanting to debride part of that and also fix any ligaments they encounter intraoperatively  No history of anesthesia problems  No known family history to anesthesia    PT after and in a boot for about a month    PMH: migraines since 1st grade - once a week. See neurology and they are well controlled.  Currently on birth control for hormone regulation    PCP: [facility]      12/18/2024   Pre-Op Questionnaire   Have you ever had a heart attack or stroke? No   Have you ever had surgery on your heart or blood vessels, such as a stent placement, a coronary artery bypass, or surgery on an artery in your head, neck, heart, or legs? No   Do you have chest pain with activity? No   Do you have a history of heart failure? No   Do you currently have a cold, bronchitis or symptoms of other infection? No   Do you have a cough, shortness of breath, or wheezing? No   Do you or anyone in your family have previous history of blood clots? No   Do you or does anyone in your family have a serious bleeding problem such as prolonged bleeding following surgeries or cuts? No   Have you ever had problems with anemia or been told to take iron pills? No   Have you had any abnormal blood loss such as black, tarry or bloody stools, or abnormal vaginal bleeding? No   Have you ever had a blood transfusion? No   Are you willing to have a blood transfusion if it is medically needed before, during, or after your surgery? Yes   Have you  or any of your relatives ever had problems with anesthesia? No   Do you have sleep apnea, excessive snoring or daytime drowsiness? No   Do you have any artifical heart valves or other implanted medical devices like a pacemaker, defibrillator, or continuous glucose monitor? No   Do you have artificial joints? No   Are you allergic to latex? No     Health Care Directive  Patient does not have a Health Care Directive: Discussed advance care planning with patient; information given to patient to review.    Preoperative Review of    reviewed - no record of controlled substances prescribed.          Patient Active Problem List    Diagnosis Date Noted    CARDIOVASCULAR SCREENING; LDL GOAL LESS THAN 160 02/10/2010     Priority: Medium    GERD (gastroesophageal reflux disease) 11/09/2009     Priority: Medium    Blood type A+ 09/11/2009     Priority: Medium    Migraine variant 05/31/2005     Priority: Medium     Nasal spray  Followed by Dr. Loving, [facility]; 8/2011  Doing well on Nortriptyline 50 mg; 2 headaches per week  Problem list name updated by automated process. Provider to review        Past Medical History:   Diagnosis Date    Gastroesophageal reflux disease     Pain in joint, pelvic region and thigh     Variants of migraine, not elsewhere classified, without mention of intractable migraine without mention of status migrainosus     Nasal spray     Past Surgical History:   Procedure Laterality Date    BIOPSY BREAST SEED LOCALIZATION Left 04/15/2019    Procedure: left breast seed localized biopsy, left areola punch biopsy;  Surgeon: Paula Rodriguez MD;  Location:  OR    BREAST SURGERY  [id number]    Lump removed    COLONOSCOPY N/A 9/27/2023    Procedure: Colonoscopy;  Surgeon: Toby Alvarez MD;  Location:  GI    ESOPHAGOSCOPY, GASTROSCOPY, DUODENOSCOPY (EGD), COMBINED N/A 04/20/2017    Procedure: COMBINED ESOPHAGOSCOPY, GASTROSCOPY, DUODENOSCOPY (EGD), BIOPSY SINGLE OR MULTIPLE;   ESOPHAGOSCOPY, GASTROSCOPY, DUODENOSCOPY (EGD) with biopsies by cold forcep.  ;  Surgeon: Mario Dey MD;  Location:  GI    [facility] NONSPECIFIC PROCEDURE  2000    Cryotherapy     Current Outpatient Medications   Medication Sig Dispense Refill    levonorgestrel-ethinyl estradiol (JOLESSA) 0.15-0.03 MG tablet TAKE 1 TABLET DAILY 91 tablet 0    magnesium 250 MG tablet Take 1 tablet by mouth 2 times daily.      MULTIVITAMINS OR TABS 1 TABLET DAILY      Riboflavin (VITAMIN B-2 PO) Take 200 mg by mouth 2 times daily.      rimegepant (NURTEC) 75 MG ODT tablet       VITAMIN D, CHOLECALCIFEROL, PO Take 1,000 Units by mouth daily      zonisamide (ZONEGRAN) 100 MG capsule Take 1 capsule by mouth At Bedtime      omeprazole (PRILOSEC OTC) 20 MG EC tablet Take 1 tablet by mouth daily.         Allergies   Allergen Reactions    Seasonal Allergies Other (See Comments)    Penicillins Nausea and Vomiting and Rash     rash        Social History     Tobacco Use    Smoking status: Former     Current packs/day: 0.00     Average packs/day: 0.5 packs/day for 1 year (0.5 ttl pk-yrs)     Types: Cigarettes     Start date: 2005     Quit date: 2006     Years since quittin.0    Smokeless tobacco: Never    Tobacco comments:     Started in    Substance Use Topics    Alcohol use: Yes     Comment: 1 drink a month       History   Drug Use No           GENERAL: No fever, chills, weight loss or gain  HEENT: No headache, trauma, changes in vision, hearing, nasal congestion, dental pain, neck pain, sore throat.  CARDIAC: Denies chest pain or shortness of breath  LUNG: Denies dyspnea on exertion or chest pain  ABD: Denies pain, nausea, vomiting, diarrhea, constipation  : No changes in bladder habits  SKIN: Denies new rashes  NEURO: No numbness, weakness, tingling   MSK: See HPI  PSYCH: No changes in mood, no HI or SI      Objective    /83 (BP Location: Right arm, Patient Position: Right side, Cuff Size: Adult  "Regular)   Pulse 111   Temp 97.6  F (36.4  C) (Oral)   Resp 18   Ht 1.778 m (5' 10\")   Wt 95.3 kg (210 lb)   SpO2 100%   BMI 30.13 kg/m     Estimated body mass index is 30.13 kg/m  as calculated from the following:    Height as of this encounter: 1.778 m (5' 10\").    Weight as of this encounter: 95.3 kg (210 lb).  Physical Exam  Vitals reviewed.   Constitutional:       Appearance: Normal appearance.   HENT:      Head: Normocephalic and atraumatic.      Right Ear: Tympanic membrane, ear canal and external ear normal.      Left Ear: Tympanic membrane, ear canal and external ear normal.      Nose: Nose normal.      Mouth/Throat:      Mouth: Mucous membranes are moist.   Eyes:      Extraocular Movements: Extraocular movements intact.      Pupils: Pupils are equal, round, and reactive to light.   Cardiovascular:      Rate and Rhythm: Normal rate and regular rhythm.      Heart sounds: Normal heart sounds.   Pulmonary:      Effort: Pulmonary effort is normal.      Breath sounds: Normal breath sounds.   Abdominal:      General: Abdomen is flat. Bowel sounds are normal.      Palpations: Abdomen is soft.   Musculoskeletal:      Cervical back: Normal range of motion and neck supple.      Comments: Appropriate strength in tested fields  Point tenderness to the distal tibia laterally on the left.     Skin:     General: Skin is warm and dry.   Neurological:      General: No focal deficit present.      Mental Status: She is alert.   Psychiatric:         Mood and Affect: Mood normal.         Behavior: Behavior normal.       Recent Labs   Lab Test 06/17/24  0858   HGB 13.7         POTASSIUM 4.2   CR 0.77        Diagnostics  No labs were ordered during this visit.   No EKG required for low risk surgery (cataract, skin procedure, breast biopsy, etc).    Revised Cardiac Risk Index (RCRI)  The patient has the following serious cardiovascular risks for perioperative complications:   - No serious cardiac risks = 0 " points     RCRI Interpretation: 0 points: Class I (very low risk - 0.4% complication rate)         Signed Electronically by: Chase Rey,   A copy of this evaluation report is provided to the requesting physician.

## 2024-12-23 NOTE — PATIENT INSTRUCTIONS
How to Take Your Medication Before Surgery  Preoperative Medication Instructions   Antiplatelet or Anticoagulation Medication Instructions   - Patient is on no antiplatelet or anticoagulation medications.    Additional Medication Instructions  Take all scheduled medications on the day of surgery       Patient Education   Preparing for Your Surgery  For Adults  Getting started  In most cases, a nurse will call to review your health history and instructions. They will give you an arrival time based on your scheduled surgery time. Please be ready to share:  Your doctor's clinic name and phone number  Your medical, surgical, and anesthesia history  A list of allergies and sensitivities  A list of medicines, including herbal treatments and over-the-counter drugs  Whether the patient has a legal guardian (ask how to send us the papers in advance)  Note: You may not receive a call if you were seen at our PAC (Preoperative Assessment Center).  Please tell us if you're pregnant--or if there's any chance you might be pregnant. Some surgeries may injure a fetus (unborn baby), so they require a pregnancy test. Surgeries that are safe for a fetus don't always need a test, and you can choose whether to have one.   Preparing for surgery  Within 10 to 30 days of surgery: Have a pre-op exam (sometimes called an H&P, or History and Physical). This can be done at a clinic or pre-operative center.  If you're having a , you may not need this exam. Talk to your care team.  At your pre-op exam, talk to your care team about all medicines you take. (This includes CBD oil and any drugs, such as THC, marijuana, and other forms of cannabis.) If you need to stop any medicine before surgery, ask when to start taking it again.  This is for your safety. Many medicines and drugs can make you bleed too much during surgery. Some change how well surgery (anesthesia) drugs work.  Call your insurance company to let them know you're having  surgery. (If you don't have insurance, call [phone].)  Call your clinic if there's any change in your health. This includes a scrape or scratch near the surgery site, or any signs of a cold (sore throat, runny nose, cough, rash, fever).  Eating and drinking guidelines  For your safety: Unless your surgeon tells you otherwise, follow the guidelines below.  Eat and drink as normal until 8 hours before you arrive for surgery. After that, no food or milk. You can spit out gum when you arrive.  Drink clear liquids until 2 hours before you arrive. These are liquids you can see through, like water, Gatorade, and Propel Water. They also include plain black coffee and tea (no cream or milk).  No alcohol for 24 hours before you arrive. The night before surgery, stop any drinks that contain THC.  If your care team tells you to take medicine on the morning of surgery, it's okay to take it with a sip of water. No other medicines or drugs are allowed (including CBD oil)--follow your care team's instructions.  If you have questions the day of surgery, call your hospital or surgery center.   Preventing infection  Shower or bathe the night before and the morning of surgery. Follow the instructions your clinic gave you. (If no instructions, use regular soap.)  Don't shave or clip hair near your surgery site. We'll remove the hair if needed.  Don't smoke or vape the morning of surgery. No chewing tobacco for 6 hours before you arrive. A nicotine patch is okay. You may spit out nicotine gum when you arrive.  For some surgeries, the surgeon will tell you to fully quit smoking and nicotine.  We will make every effort to keep you safe from infection. We will:  Clean our hands often with soap and water (or an alcohol-based hand rub).  Clean the skin at your surgery site with a special soap that kills germs.  Give you a special gown to keep you warm. (Cold raises the risk of infection.)  Wear hair covers, masks, gowns, and gloves  during surgery.  Give antibiotic medicine, if prescribed. Not all surgeries need this medicine.  What to bring on the day of surgery  Photo ID and insurance card  Copy of your health care directive, if you have one  Glasses and hearing aids (bring cases)  You can't wear contacts during surgery  Inhaler and eye drops, if you use them (tell us about these when you arrive)  CPAP machine or breathing device, if you use them  A few personal items, if spending the night  If you have . . .  A pacemaker, ICD (cardiac defibrillator), or other implant: Bring the ID card.  An implanted stimulator: Bring the remote control.  A legal guardian: Bring a copy of the certified (court-stamped) guardianship papers.  Please remove any jewelry, including body piercings. Leave jewelry and other valuables at home.  If you're going home the day of surgery  You must have a responsible adult drive you home. They should stay with you overnight as well.  If you don't have someone to stay with you, and you aren't safe to go home alone, we may keep you overnight. Insurance often won't pay for this.  After surgery  If it's hard to control your pain or you need more pain medicine, please call your surgeon's office.  Questions?   If you have any questions for your care team, list them here:   ____________________________________________________________________________________________________________________________________________________________________________________________________________________________________________________________  For informational purposes only. Not to replace the advice of your health care provider. Copyright   2003, 2019 [facility]. All rights reserved. Clinically reviewed by Rashawn Rao MD. [employer] [id number] - REV 08/24.     Patient Education   Preparing for Your Surgery  For Adults  Getting started  In most cases, a nurse will call to review your health history and instructions. They will give  you an arrival time based on your scheduled surgery time. Please be ready to share:  Your doctor's clinic name and phone number  Your medical, surgical, and anesthesia history  A list of allergies and sensitivities  A list of medicines, including herbal treatments and over-the-counter drugs  Whether the patient has a legal guardian (ask how to send us the papers in advance)  Note: You may not receive a call if you were seen at our PAC (Preoperative Assessment Center).  Please tell us if you're pregnant--or if there's any chance you might be pregnant. Some surgeries may injure a fetus (unborn baby), so they require a pregnancy test. Surgeries that are safe for a fetus don't always need a test, and you can choose whether to have one.   Preparing for surgery  Within 10 to 30 days of surgery: Have a pre-op exam (sometimes called an H&P, or History and Physical). This can be done at a clinic or pre-operative center.  If you're having a , you may not need this exam. Talk to your care team.  At your pre-op exam, talk to your care team about all medicines you take. (This includes CBD oil and any drugs, such as THC, marijuana, and other forms of cannabis.) If you need to stop any medicine before surgery, ask when to start taking it again.  This is for your safety. Many medicines and drugs can make you bleed too much during surgery. Some change how well surgery (anesthesia) drugs work.  Call your insurance company to let them know you're having surgery. (If you don't have insurance, call [phone].)  Call your clinic if there's any change in your health. This includes a scrape or scratch near the surgery site, or any signs of a cold (sore throat, runny nose, cough, rash, fever).  Eating and drinking guidelines  For your safety: Unless your surgeon tells you otherwise, follow the guidelines below.  Eat and drink as normal until 8 hours before you arrive for surgery. After that, no food or milk. You can spit out gum  when you arrive.  Drink clear liquids until 2 hours before you arrive. These are liquids you can see through, like water, Gatorade, and Propel Water. They also include plain black coffee and tea (no cream or milk).  No alcohol for 24 hours before you arrive. The night before surgery, stop any drinks that contain THC.  If your care team tells you to take medicine on the morning of surgery, it's okay to take it with a sip of water. No other medicines or drugs are allowed (including CBD oil)--follow your care team's instructions.  If you have questions the day of surgery, call your hospital or surgery center.   Preventing infection  Shower or bathe the night before and the morning of surgery. Follow the instructions your clinic gave you. (If no instructions, use regular soap.)  Don't shave or clip hair near your surgery site. We'll remove the hair if needed.  Don't smoke or vape the morning of surgery. No chewing tobacco for 6 hours before you arrive. A nicotine patch is okay. You may spit out nicotine gum when you arrive.  For some surgeries, the surgeon will tell you to fully quit smoking and nicotine.  We will make every effort to keep you safe from infection. We will:  Clean our hands often with soap and water (or an alcohol-based hand rub).  Clean the skin at your surgery site with a special soap that kills germs.  Give you a special gown to keep you warm. (Cold raises the risk of infection.)  Wear hair covers, masks, gowns, and gloves during surgery.  Give antibiotic medicine, if prescribed. Not all surgeries need this medicine.  What to bring on the day of surgery  Photo ID and insurance card  Copy of your health care directive, if you have one  Glasses and hearing aids (bring cases)  You can't wear contacts during surgery  Inhaler and eye drops, if you use them (tell us about these when you arrive)  CPAP machine or breathing device, if you use them  A few personal items, if spending the night  If you have . .  .  A pacemaker, ICD (cardiac defibrillator), or other implant: Bring the ID card.  An implanted stimulator: Bring the remote control.  A legal guardian: Bring a copy of the certified (court-stamped) guardianship papers.  Please remove any jewelry, including body piercings. Leave jewelry and other valuables at home.  If you're going home the day of surgery  You must have a responsible adult drive you home. They should stay with you overnight as well.  If you don't have someone to stay with you, and you aren't safe to go home alone, we may keep you overnight. Insurance often won't pay for this.  After surgery  If it's hard to control your pain or you need more pain medicine, please call your surgeon's office.  Questions?   If you have any questions for your care team, list them here:   ____________________________________________________________________________________________________________________________________________________________________________________________________________________________________________________________  For informational purposes only. Not to replace the advice of your health care provider. Copyright   2003, 2019 [facility]. All rights reserved. Clinically reviewed by Rashawn Rao MD. [employer] [id number] - REV 08/24.

## 2025-02-04 DIAGNOSIS — Z30.09 GENERAL COUNSELING FOR PRESCRIPTION OF ORAL CONTRACEPTIVES: ICD-10-CM

## 2025-02-05 RX ORDER — LEVONORGESTREL AND ETHINYL ESTRADIOL 0.15-0.03
1 KIT ORAL DAILY
Qty: 91 TABLET | Refills: 0 | Status: SHIPPED | OUTPATIENT
Start: 2025-02-05

## 2025-02-10 ENCOUNTER — TRANSFERRED RECORDS (OUTPATIENT)
Dept: HEALTH INFORMATION MANAGEMENT | Facility: CLINIC | Age: 50
End: 2025-02-10
Payer: COMMERCIAL

## 2025-03-08 ENCOUNTER — OFFICE VISIT (OUTPATIENT)
Dept: URGENT CARE | Facility: URGENT CARE | Age: 50
End: 2025-03-08
Payer: COMMERCIAL

## 2025-03-08 VITALS
TEMPERATURE: 97.7 F | HEART RATE: 87 BPM | OXYGEN SATURATION: 98 % | SYSTOLIC BLOOD PRESSURE: 129 MMHG | BODY MASS INDEX: 29.7 KG/M2 | WEIGHT: 207 LBS | RESPIRATION RATE: 18 BRPM | DIASTOLIC BLOOD PRESSURE: 81 MMHG

## 2025-03-08 DIAGNOSIS — F41.9 ACUTE ANXIETY: Primary | ICD-10-CM

## 2025-03-08 DIAGNOSIS — T50.905A MEDICATION SIDE EFFECTS, INITIAL ENCOUNTER: ICD-10-CM

## 2025-03-08 PROCEDURE — 3074F SYST BP LT 130 MM HG: CPT | Performed by: PHYSICIAN ASSISTANT

## 2025-03-08 PROCEDURE — 99214 OFFICE O/P EST MOD 30 MIN: CPT | Performed by: PHYSICIAN ASSISTANT

## 2025-03-08 PROCEDURE — 3079F DIAST BP 80-89 MM HG: CPT | Performed by: PHYSICIAN ASSISTANT

## 2025-03-08 RX ORDER — FREMANEZUMAB-VFRM 225 MG/1.5ML
INJECTION SUBCUTANEOUS
COMMUNITY
Start: 2025-02-07

## 2025-03-08 RX ORDER — HYDROXYZINE HYDROCHLORIDE 25 MG/1
TABLET, FILM COATED ORAL
Qty: 30 TABLET | Refills: 0 | Status: SHIPPED | OUTPATIENT
Start: 2025-03-08

## 2025-03-08 RX ORDER — PANTOPRAZOLE SODIUM 40 MG/1
1 TABLET, DELAYED RELEASE ORAL DAILY
COMMUNITY
Start: 2024-06-21

## 2025-03-08 ASSESSMENT — PATIENT HEALTH QUESTIONNAIRE - PHQ9
5. POOR APPETITE OR OVEREATING: NEARLY EVERY DAY
SUM OF ALL RESPONSES TO PHQ QUESTIONS 1-9: 18

## 2025-03-08 ASSESSMENT — ENCOUNTER SYMPTOMS
FEVER: 0
SLEEP DISTURBANCE: 1
APPETITE CHANGE: 1
AGITATION: 1
SHORTNESS OF BREATH: 0
NERVOUS/ANXIOUS: 1

## 2025-03-08 ASSESSMENT — ANXIETY QUESTIONNAIRES
1. FEELING NERVOUS, ANXIOUS, OR ON EDGE: NEARLY EVERY DAY
GAD7 TOTAL SCORE: 19
3. WORRYING TOO MUCH ABOUT DIFFERENT THINGS: NEARLY EVERY DAY
IF YOU CHECKED OFF ANY PROBLEMS ON THIS QUESTIONNAIRE, HOW DIFFICULT HAVE THESE PROBLEMS MADE IT FOR YOU TO DO YOUR WORK, TAKE CARE OF THINGS AT HOME, OR GET ALONG WITH OTHER PEOPLE: NOT DIFFICULT AT ALL
5. BEING SO RESTLESS THAT IT IS HARD TO SIT STILL: NEARLY EVERY DAY
7. FEELING AFRAID AS IF SOMETHING AWFUL MIGHT HAPPEN: MORE THAN HALF THE DAYS
6. BECOMING EASILY ANNOYED OR IRRITABLE: MORE THAN HALF THE DAYS
2. NOT BEING ABLE TO STOP OR CONTROL WORRYING: NEARLY EVERY DAY
GAD7 TOTAL SCORE: 19

## 2025-03-08 NOTE — PROGRESS NOTES
Assessment & Plan:        ICD-10-CM    1. Acute anxiety  F41.9 hydrOXYzine HCl (ATARAX) 25 MG tablet     Adult Mental Health  Referral      2. Medication side effects, initial encounter  T50.361M             Plan/Clinical Decision Making:    Patient presents with acute anxiety, agitated, looser stools. Just recently had injection of new medication Ajovy. She has also had a bit of stress in her life. Normal exam. Unsure if symptoms due to side effects, situational stress or combination. Will treat with hydroxyzine and referral to mental health for therapy.       Return if symptoms worsen or fail to improve, for in 3-5 days.     At the end of the encounter, I discussed results, diagnosis, medications. Discussed red flags for immediate return to clinic/ER, as well as indications for follow up if no improvement. Patient understood and agreed to plan. Patient was stable for discharge.        Velvet Arriaga PA-C on 3/8/2025 at 11:37 AM          Subjective:     HPI:    Cassi is a 49 year old female who presents to clinic today for the following health issues:  Chief Complaint   Patient presents with    Urgent Care     Pt states she has been having a lot of anxiety and more bowel movements.She feels she is usually high strung but this is more.     HPI    Had some anxiety that started Thursday, then worsened, Patient complains of hard time sleeping, looser stools yesterday. Decreased appetite.   Has had a bit of stresses lately in life.   Denies depression symptoms. Mostly anxious, feels on edge. Agitated, restless.   Reviewed medications.   Ajovy- Migraine prevention. Just started this medication. Once a month injection. Had last month and felt fine, took it yesterday.   Nurtec: migraine medication- acute migraine  Zonegran-migraines prevention.       Review of Systems   Constitutional:  Positive for appetite change. Negative for fever.   Respiratory:  Negative for shortness of breath.    Cardiovascular:   Negative for chest pain and leg swelling.   Psychiatric/Behavioral:  Positive for agitation and sleep disturbance. The patient is nervous/anxious.          Patient Active Problem List   Diagnosis    Migraine variant    Blood type A+    GERD (gastroesophageal reflux disease)    CARDIOVASCULAR SCREENING; LDL GOAL LESS THAN 160        Past Medical History:   Diagnosis Date    Gastroesophageal reflux disease     Pain in joint, pelvic region and thigh     Variants of migraine, not elsewhere classified, without mention of intractable migraine without mention of status migrainosus     Nasal spray       Social History     Tobacco Use    Smoking status: Former     Current packs/day: 0.00     Average packs/day: 0.5 packs/day for 1 year (0.5 ttl pk-yrs)     Types: Cigarettes     Start date: 2005     Quit date: 2006     Years since quittin.2    Smokeless tobacco: Never    Tobacco comments:     Started in    Substance Use Topics    Alcohol use: Yes     Comment: 1 drink a month             Objective:     Vitals:    25 1134   BP: 129/81   Pulse: 87   Resp: 18   Temp: 97.7  F (36.5  C)   TempSrc: Tympanic   SpO2: 98%   Weight: 93.9 kg (207 lb)         Physical Exam   EXAM:   Pleasant, alert, appropriate appearance. NAD.  Head Exam: Normocephalic, atraumatic.  Eye Exam:  non icteric/injection.    Ear Exam: TMs grey without bulging. Normal canals.  Normal pinna.  Nose Exam: Normal external nose.    OroPharynx Exam:  Moist mucous membranes. No erythema, pharynx without exudate or hypertrophy.  Neck/Thyroid Exam:  No LAD.   Chest/Respiratory Exam: CTAB.  Cardiovascular Exam: RRR. No murmur or rubs.  Psych: calm, well spoken.     Results:  No results found for any visits on 25.

## 2025-03-20 ENCOUNTER — HOSPITAL ENCOUNTER (OUTPATIENT)
Dept: MAMMOGRAPHY | Facility: CLINIC | Age: 50
Discharge: HOME | End: 2025-03-20
Payer: COMMERCIAL

## 2025-03-20 DIAGNOSIS — Z12.31 VISIT FOR SCREENING MAMMOGRAM: ICD-10-CM

## 2025-03-20 PROCEDURE — 77063 BREAST TOMOSYNTHESIS BI: CPT

## 2025-03-24 ENCOUNTER — TRANSFERRED RECORDS (OUTPATIENT)
Dept: HEALTH INFORMATION MANAGEMENT | Facility: CLINIC | Age: 50
End: 2025-03-24
Payer: COMMERCIAL

## 2025-05-05 DIAGNOSIS — Z30.09 GENERAL COUNSELING FOR PRESCRIPTION OF ORAL CONTRACEPTIVES: ICD-10-CM

## 2025-05-06 RX ORDER — LEVONORGESTREL / ETHINYL ESTRADIOL 0.15-0.03
1 KIT ORAL DAILY
Qty: 91 TABLET | Refills: 0 | Status: SHIPPED | OUTPATIENT
Start: 2025-05-06

## 2025-05-20 SDOH — HEALTH STABILITY: PHYSICAL HEALTH: ON AVERAGE, HOW MANY MINUTES DO YOU ENGAGE IN EXERCISE AT THIS LEVEL?: 30 MIN

## 2025-05-20 SDOH — HEALTH STABILITY: PHYSICAL HEALTH: ON AVERAGE, HOW MANY DAYS PER WEEK DO YOU ENGAGE IN MODERATE TO STRENUOUS EXERCISE (LIKE A BRISK WALK)?: 5 DAYS

## 2025-05-20 ASSESSMENT — SOCIAL DETERMINANTS OF HEALTH (SDOH): HOW OFTEN DO YOU GET TOGETHER WITH FRIENDS OR RELATIVES?: ONCE A WEEK

## 2025-05-22 ENCOUNTER — OFFICE VISIT (OUTPATIENT)
Dept: INTERNAL MEDICINE | Facility: CLINIC | Age: 50
End: 2025-05-22
Payer: COMMERCIAL

## 2025-05-22 VITALS
HEART RATE: 119 BPM | RESPIRATION RATE: 18 BRPM | OXYGEN SATURATION: 100 % | BODY MASS INDEX: 30.28 KG/M2 | WEIGHT: 211.5 LBS | DIASTOLIC BLOOD PRESSURE: 74 MMHG | SYSTOLIC BLOOD PRESSURE: 122 MMHG | TEMPERATURE: 97.1 F | HEIGHT: 70 IN

## 2025-05-22 DIAGNOSIS — Z12.4 CERVICAL CANCER SCREENING: ICD-10-CM

## 2025-05-22 DIAGNOSIS — Z00.00 ROUTINE GENERAL MEDICAL EXAMINATION AT A HEALTH CARE FACILITY: Primary | ICD-10-CM

## 2025-05-22 RX ORDER — ESOMEPRAZOLE MAGNESIUM 40 MG/1
40 CAPSULE, DELAYED RELEASE ORAL
COMMUNITY
Start: 2025-04-29

## 2025-05-22 NOTE — PROGRESS NOTES
"Preventive Care Visit  [facility]  Amanda Jules MD, Internal Medicine  May 22, 2025      Assessment & Plan     Routine general medical examination at a health care facility  Fasting lab work ordered.  This patient is not fasting today, patient will be making a lab only appointment.  Up-to-date with mammogram for breast cancer screening and colonoscopy for colon cancer screening.  - Lipid panel reflex to direct LDL Fasting; Future  - Comprehensive metabolic panel; Future  - CBC with platelets; Future    Cervical cancer screening  - HPV and Gynecologic Cytology Panel - Recommended Age 30 - 65 Years    Patient has been advised of split billing requirements and indicates understanding: Yes        BMI  Estimated body mass index is 30.35 kg/m  as calculated from the following:    Height as of this encounter: 1.778 m (5' 10\").    Weight as of this encounter: 95.9 kg (211 lb 8 oz).       Counseling  Appropriate preventive services were discussed with this patient, including applicable screening as appropriate for nutrition, physical activity    Subjective   Cassi is a 49 year old, presenting for the following:  Physical        5/22/2025     3:33 PM   Additional Questions   Roomed by Marloni   Accompanied by Self          HPI     Patient comes in today for annual physical visit.  Overall, no new concerns.  Has a past medical history of acid reflux for which patient follows up with [facility] and migraine headaches for which patient follows up with the neurology team.  Medication management as per the specialty teams.    Advance Care Planning    Discussed advance care planning with patient; however, patient declined at this time.        5/20/2025   General Health   How would you rate your overall physical health? Good   Feel stress (tense, anxious, or unable to sleep) Rather much   (!) STRESS CONCERN      5/20/2025   Nutrition   Three or more servings of calcium each day? Yes   Diet: Regular (no " restrictions)   How many servings of fruit and vegetables per day? (!) 2-3   How many sweetened beverages each day? 0-1         2025   Exercise   Days per week of moderate/strenous exercise 5 days   Average minutes spent exercising at this level 30 min         2025   Social Factors   Frequency of gathering with friends or relatives Once a week   Worry food won't last until get money to buy more No   Food not last or not have enough money for food? No   Do you have housing? (Housing is defined as stable permanent housing and does not include staying outside in a car, in a tent, in an abandoned building, in an overnight shelter, or couch-surfing.) Yes   Are you worried about losing your housing? No   Lack of transportation? No   Unable to get utilities (heat,electricity)? No         2025   Dental   Dentist two times every year? Yes         Today's PHQ-2 Score:       2025     2:14 PM   PHQ-2 (  [employer])   Q1: Little interest or pleasure in doing things 0   Q2: Feeling down, depressed or hopeless 0   PHQ-2 Score 0    Q1: Little interest or pleasure in doing things Not at all   Q2: Feeling down, depressed or hopeless Not at all   PHQ-2 Score 0       Patient-reported           2025   Substance Use   Alcohol more than 3/day or more than 7/wk No   Do you use any other substances recreationally? No     Social History     Tobacco Use    Smoking status: Former     Current packs/day: 0.00     Average packs/day: 0.5 packs/day for 1 year (0.5 ttl pk-yrs)     Types: Cigarettes     Start date: 2005     Quit date: 2006     Years since quittin.4    Smokeless tobacco: Never    Tobacco comments:     Started in    Vaping Use    Vaping status: Never Used   Substance Use Topics    Alcohol use: Yes     Comment: 1 drink a month    Drug use: No           3/20/2025   LAST FHS-7 RESULTS   1st degree relative breast or ovarian cancer No   Any relative bilateral breast cancer No   Any male have  "breast cancer No   Any ONE woman have BOTH breast AND ovarian cancer No   Any woman with breast cancer before 50yrs Yes    No   2 or more relatives with breast AND/OR ovarian cancer Yes   2 or more relatives with breast AND/OR bowel cancer No       Multiple values from one day are sorted in reverse-chronological order        Mammogram Screening - Mammogram every 1-2 years updated in Health Maintenance based on mutual decision making        5/20/2025   STI Screening   New sexual partner(s) since last STI/HIV test? No     History of abnormal Pap smear: No - age 30- 64 PAP with HPV every 5 years recommended        Latest Ref Rng & Units 11/20/2019     5:10 PM 11/20/2019     5:00 PM 11/7/2016     4:53 PM   PAP / HPV   PAP (Historical)  NIL      HPV 16 DNA NEG^Negative  Negative  Negative    HPV 18 DNA NEG^Negative  Negative  Negative    Other HR HPV NEG^Negative  Negative  Negative      ASCVD Risk   The 10-year ASCVD risk score (Sonia ALBARRAN, et al., 2019) is: 1%    Values used to calculate the score:      Age: 49 years      Sex: Female      Is Non- : No      Diabetic: No      Tobacco smoker: No      Systolic Blood Pressure: 122 mmHg      Is BP treated: No      HDL Cholesterol: 46 mg/dL      Total Cholesterol: 183 mg/dL        5/20/2025   Contraception/Family Planning   Questions about contraception or family planning No        Reviewed and updated as needed this visit by Provider                          Review of Systems  Constitutional, HEENT, cardiovascular, pulmonary, gi and gu systems are negative, except as otherwise noted.     Objective    Exam  /74 (BP Location: Right arm, Patient Position: Sitting, Cuff Size: Adult Regular)   Pulse 119   Temp 97.1  F (36.2  C) (Tympanic)   Resp 18   Ht 1.778 m (5' 10\")   Wt 95.9 kg (211 lb 8 oz)   LMP 04/24/2025 (Exact Date)   SpO2 100%   BMI 30.35 kg/m     Estimated body mass index is 30.35 kg/m  as calculated from the following:    " "Height as of this encounter: 1.778 m (5' 10\").    Weight as of this encounter: 95.9 kg (211 lb 8 oz).    Physical Exam  GENERAL: alert and no distress  EYES: Eyes grossly normal to inspection, PERRL and conjunctivae and sclerae normal  HENT: ear canals and TM's normal, nose and mouth without ulcers or lesions  RESP: lungs clear to auscultation - no rales, rhonchi or wheezes  BREAST: normal without masses, tenderness or nipple discharge and no palpable axillary masses or adenopathy  CV: regular rate and rhythm, normal S1 S2  ABDOMEN: soft, nontender, no hepatosplenomegaly, no masses  MS: no gross musculoskeletal defects noted, no edema  NEURO: Normal strength and tone, mentation intact and speech normal  PSYCH: mentation appears normal, affect normal.        Signed Electronically by: Amanda Jules MD    "

## 2025-05-23 ENCOUNTER — RESULTS FOLLOW-UP (OUTPATIENT)
Dept: OBGYN | Facility: CLINIC | Age: 50
End: 2025-05-23

## 2025-05-28 LAB
BKR AP ASSOCIATED HPV REPORT: NORMAL
BKR LAB AP GYN ADEQUACY: NORMAL
BKR LAB AP GYN INTERPRETATION: NORMAL
BKR LAB AP LMP: NORMAL
BKR LAB AP PREVIOUS ABNORMAL: NORMAL
PATH REPORT.COMMENTS IMP SPEC: NORMAL
PATH REPORT.COMMENTS IMP SPEC: NORMAL
PATH REPORT.RELEVANT HX SPEC: NORMAL

## 2025-06-09 ENCOUNTER — E-VISIT (OUTPATIENT)
Dept: INTERNAL MEDICINE | Facility: CLINIC | Age: 50
End: 2025-06-09
Payer: COMMERCIAL

## 2025-06-09 DIAGNOSIS — R30.0 DYSURIA: Primary | ICD-10-CM

## 2025-06-10 NOTE — PATIENT INSTRUCTIONS
Dear Cassi,     After reviewing your responses, I would like you to come in for a urine test to make sure we treat you correctly. This urine test is to evaluate you for a possible urinary tract infection, and should be completed today or tomorrow. Schedule a Lab Only appointment here.     If a Lab appointment is not available, please check this site for lab locations and hours (many labs are closed evenings and weekends). You may walk into any Lab location during their operating hours without an appointment to complete your urine test. Please let the lab staff know that you have had an eVisit and your provider has ordered a urine test.     You will receive instructions with your results in [employer] once they are available.     If your symptoms worsen, you develop pain in your back or stomach, develop fevers, or are not improving in 5 days, please contact your primary care provider for an appointment or visit a Walk-in or Urgent Care Center to be seen.     Thanks again for choosing us as your health care partner,     Amanda Jules MD

## 2025-06-11 ENCOUNTER — LAB (OUTPATIENT)
Dept: LAB | Facility: CLINIC | Age: 50
End: 2025-06-11
Payer: COMMERCIAL

## 2025-06-11 DIAGNOSIS — R30.0 DYSURIA: ICD-10-CM

## 2025-06-11 LAB
ALBUMIN UR-MCNC: NEGATIVE MG/DL
APPEARANCE UR: CLEAR
BACTERIA #/AREA URNS HPF: ABNORMAL /HPF
BILIRUB UR QL STRIP: NEGATIVE
COLOR UR AUTO: YELLOW
GLUCOSE UR STRIP-MCNC: NEGATIVE MG/DL
HGB UR QL STRIP: NEGATIVE
KETONES UR STRIP-MCNC: NEGATIVE MG/DL
LEUKOCYTE ESTERASE UR QL STRIP: ABNORMAL
NITRATE UR QL: NEGATIVE
PH UR STRIP: 6.5 [PH] (ref 5–7)
RBC #/AREA URNS AUTO: ABNORMAL /HPF
SP GR UR STRIP: 1.01 (ref 1–1.03)
SQUAMOUS #/AREA URNS AUTO: ABNORMAL /LPF
UROBILINOGEN UR STRIP-ACNC: 0.2 E.U./DL
WBC #/AREA URNS AUTO: ABNORMAL /HPF

## 2025-06-11 PROCEDURE — 81001 URINALYSIS AUTO W/SCOPE: CPT

## 2025-06-12 ENCOUNTER — RESULTS FOLLOW-UP (OUTPATIENT)
Dept: INTERNAL MEDICINE | Facility: CLINIC | Age: 50
End: 2025-06-12
Payer: COMMERCIAL

## 2025-06-12 DIAGNOSIS — R30.0 DYSURIA: Primary | ICD-10-CM

## 2025-06-12 NOTE — TELEPHONE ENCOUNTER
Please see patient's mychart message.     Please advise, thanks.    Jean, Triage GUSTAVO Lazo    4:48 PM 6/12/2025

## 2025-06-13 NOTE — TELEPHONE ENCOUNTER
A urogynecology referral can be placed for lower urinary tract symptoms/increased frequency.  Please kindly pend referral if patient would like to proceed with it.

## 2025-06-13 NOTE — TELEPHONE ENCOUNTER
Referral completed.  Do not see documentation as to whether this was discussed with the patient.  Please confirm if patient wants a referral.

## 2025-06-16 ENCOUNTER — PATIENT OUTREACH (OUTPATIENT)
Dept: CARE COORDINATION | Facility: CLINIC | Age: 50
End: 2025-06-16
Payer: COMMERCIAL

## 2025-06-18 ENCOUNTER — PATIENT OUTREACH (OUTPATIENT)
Dept: CARE COORDINATION | Facility: CLINIC | Age: 50
End: 2025-06-18
Payer: COMMERCIAL

## 2025-07-07 ENCOUNTER — TRANSFERRED RECORDS (OUTPATIENT)
Dept: HEALTH INFORMATION MANAGEMENT | Facility: CLINIC | Age: 50
End: 2025-07-07
Payer: COMMERCIAL

## 2025-08-04 DIAGNOSIS — Z30.09 GENERAL COUNSELING FOR PRESCRIPTION OF ORAL CONTRACEPTIVES: ICD-10-CM

## 2025-08-04 RX ORDER — LEVONORGESTREL AND ETHINYL ESTRADIOL 0.15-0.03
1 KIT ORAL DAILY
Qty: 91 TABLET | Refills: 2 | Status: SHIPPED | OUTPATIENT
Start: 2025-08-04

## (undated) DEVICE — PACK MINOR CUSTOM RIDGES SBA32RMRMA

## (undated) DEVICE — TUBING SMOKE EVAC 3/8"X10' E3645

## (undated) DEVICE — CLIP ETHICON LIGACLIP SM BLUE LT100

## (undated) DEVICE — KIT ENDO TURNOVER/PROCEDURE W/CLEAN A SCOPE LINERS 103888

## (undated) DEVICE — ESU GROUND PAD ADULT W/CORD E7507

## (undated) DEVICE — SYR 05ML LL W/O NDL

## (undated) DEVICE — DRAPE LAP W/ARMBOARD 29410

## (undated) DEVICE — LINEN TOWEL PACK X10 5473

## (undated) DEVICE — LINEN HALF SHEET 5512

## (undated) DEVICE — SU MONOCRYL 4-0 P-3 18" UND Y494G

## (undated) DEVICE — SUCTION CANISTER MEDIVAC LINER 3000ML W/LID 65651-530

## (undated) DEVICE — BAG CLEAR TRASH 1.3M 39X33" P4040C

## (undated) DEVICE — GLOVE PROTEXIS POWDER FREE 6.5 ORTHOPEDIC 2D73ET65

## (undated) DEVICE — PREFILTER SMOKE EVAC E6330

## (undated) DEVICE — PREP POVIDONE IODINE SCRUB 7.5% 4OZ APL82212

## (undated) DEVICE — PREP SKIN SCRUB TRAY 4461A

## (undated) DEVICE — SLEEVE PROTECTIVE BREAST BIOPSY  GMSLV001-10

## (undated) DEVICE — TUBING SUCTION 12"X1/4" N612

## (undated) DEVICE — Device

## (undated) DEVICE — PREP POVIDONE IODINE SOLUTION 10% 4OZ

## (undated) DEVICE — TUBING SMOKE EVAC ATTACHMENT E3590

## (undated) DEVICE — LINEN FULL SHEET 5511

## (undated) DEVICE — LINEN DRAPE 54X72" 5467

## (undated) DEVICE — SU VICRYL 3-0 TIE 12X18" J904T

## (undated) DEVICE — SOL NACL 0.9% IRRIG 1000ML BOTTLE 2F7124

## (undated) DEVICE — ENDO FORCEP ENDOJAW BIOPSY 2.8MMX160CM FB-220K

## (undated) DEVICE — SU SILK 3-0 PS-1 18" 1684G

## (undated) DEVICE — PAD CHUX UNDERPAD 30X36" P3036C

## (undated) DEVICE — SU VICRYL 3-0 SH 27" UND J416H

## (undated) RX ORDER — BUPIVACAINE HYDROCHLORIDE 2.5 MG/ML
INJECTION, SOLUTION EPIDURAL; INFILTRATION; INTRACAUDAL
Status: DISPENSED
Start: 2019-04-15

## (undated) RX ORDER — SIMETHICONE 40MG/0.6ML
SUSPENSION, DROPS(FINAL DOSAGE FORM)(ML) ORAL
Status: DISPENSED
Start: 2023-09-27

## (undated) RX ORDER — FENTANYL CITRATE 50 UG/ML
INJECTION, SOLUTION INTRAMUSCULAR; INTRAVENOUS
Status: DISPENSED
Start: 2023-09-27

## (undated) RX ORDER — FENTANYL CITRATE 50 UG/ML
INJECTION, SOLUTION INTRAMUSCULAR; INTRAVENOUS
Status: DISPENSED
Start: 2017-04-20

## (undated) RX ORDER — LEVOFLOXACIN 5 MG/ML
INJECTION, SOLUTION INTRAVENOUS
Status: DISPENSED
Start: 2019-04-15

## (undated) RX ORDER — GINSENG 100 MG
CAPSULE ORAL
Status: DISPENSED
Start: 2019-04-15

## (undated) RX ORDER — FENTANYL CITRATE 50 UG/ML
INJECTION, SOLUTION INTRAMUSCULAR; INTRAVENOUS
Status: DISPENSED
Start: 2019-04-15

## (undated) RX ORDER — LIDOCAINE HYDROCHLORIDE 10 MG/ML
INJECTION, SOLUTION EPIDURAL; INFILTRATION; INTRACAUDAL; PERINEURAL
Status: DISPENSED
Start: 2019-04-15

## (undated) RX ORDER — HYDROCODONE BITARTRATE AND ACETAMINOPHEN 5; 325 MG/1; MG/1
TABLET ORAL
Status: DISPENSED
Start: 2019-04-15